# Patient Record
Sex: FEMALE | Race: WHITE | NOT HISPANIC OR LATINO | Employment: PART TIME | URBAN - METROPOLITAN AREA
[De-identification: names, ages, dates, MRNs, and addresses within clinical notes are randomized per-mention and may not be internally consistent; named-entity substitution may affect disease eponyms.]

---

## 2024-06-19 ENCOUNTER — MEDICAL CORRESPONDENCE (OUTPATIENT)
Dept: HEALTH INFORMATION MANAGEMENT | Facility: CLINIC | Age: 44
End: 2024-06-19

## 2024-06-20 ENCOUNTER — TRANSCRIBE ORDERS (OUTPATIENT)
Dept: OTHER | Age: 44
End: 2024-06-20

## 2024-06-20 DIAGNOSIS — M41.25 IDIOPATHIC SCOLIOSIS OF THORACOLUMBAR REGION: Primary | ICD-10-CM

## 2024-07-01 DIAGNOSIS — M41.9 SCOLIOSIS: Primary | ICD-10-CM

## 2024-07-01 NOTE — TELEPHONE ENCOUNTER
"Action July 1, 2024 1:26 PM MT   Action Taken Called [facility] for CE ID# to connect patient charts. We had the patient's name incorrectly spelled. Her anem begins with an \"f\" not an \"s\". Once updated the chart was found and located. Sent a request for imaging from [place].      Action July 2, 2024 9:23 AM MT   Action Taken *MARKED CHARTS FOR MERGE*. Only one xray was sent over, called [facility] tt: rep Dory will push over STAT.        DIAGNOSIS:   Idiopathic scoliosis of thoracolumbar region [M41.25]  - Primary         APPOINTMENT DATE: 06/02/2024   NOTES STATUS DETAILS   OFFICE NOTE from referring provider Care Everywhere 06/04/2024 - Patt Noble PA-C - [facility]   OFFICE NOTE from other specialist Care Everywhere    XRAYS (IMAGES & REPORTS) PACS [place]:  12/20/2023, 08/14/2017 - Scoliosis     "

## 2024-07-02 ENCOUNTER — PRE VISIT (OUTPATIENT)
Dept: ORTHOPEDICS | Facility: CLINIC | Age: 44
End: 2024-07-02

## 2024-07-02 ENCOUNTER — TELEPHONE (OUTPATIENT)
Dept: NEUROSURGERY | Facility: CLINIC | Age: 44
End: 2024-07-02

## 2024-07-02 ENCOUNTER — OFFICE VISIT (OUTPATIENT)
Dept: ORTHOPEDICS | Facility: CLINIC | Age: 44
End: 2024-07-02
Payer: MEDICAID

## 2024-07-02 ENCOUNTER — MEDICAL CORRESPONDENCE (OUTPATIENT)
Dept: HEALTH INFORMATION MANAGEMENT | Facility: CLINIC | Age: 44
End: 2024-07-02

## 2024-07-02 ENCOUNTER — ANCILLARY PROCEDURE (OUTPATIENT)
Dept: GENERAL RADIOLOGY | Facility: CLINIC | Age: 44
End: 2024-07-02
Attending: ORTHOPAEDIC SURGERY
Payer: MEDICAID

## 2024-07-02 VITALS — BODY MASS INDEX: 22.66 KG/M2 | HEIGHT: 65 IN | WEIGHT: 136 LBS

## 2024-07-02 DIAGNOSIS — M41.26 OTHER IDIOPATHIC SCOLIOSIS, LUMBAR REGION: ICD-10-CM

## 2024-07-02 DIAGNOSIS — M41.125 ADOLESCENT IDIOPATHIC SCOLIOSIS OF THORACOLUMBAR REGION: Primary | ICD-10-CM

## 2024-07-02 DIAGNOSIS — M41.9 SCOLIOSIS: ICD-10-CM

## 2024-07-02 PROCEDURE — 72082 X-RAY EXAM ENTIRE SPI 2/3 VW: CPT | Mod: GC | Performed by: RADIOLOGY

## 2024-07-02 PROCEDURE — 77073 BONE LENGTH STUDIES: CPT | Mod: GC | Performed by: RADIOLOGY

## 2024-07-02 PROCEDURE — 99204 OFFICE O/P NEW MOD 45 MIN: CPT | Mod: GC | Performed by: ORTHOPAEDIC SURGERY

## 2024-07-02 RX ORDER — LAMOTRIGINE 200 MG/1
200 TABLET ORAL EVERY MORNING
Status: ON HOLD | COMMUNITY
End: 2024-08-31

## 2024-07-02 RX ORDER — TRETINOIN 0.5 MG/G
CREAM TOPICAL AT BEDTIME
COMMUNITY
Start: 2023-09-01

## 2024-07-02 RX ORDER — TRAZODONE HYDROCHLORIDE 50 MG/1
50 TABLET, FILM COATED ORAL
COMMUNITY
Start: 2024-06-05

## 2024-07-02 RX ORDER — BENZOYL PEROXIDE 10 G/100G
SUSPENSION TOPICAL DAILY
Status: ON HOLD | COMMUNITY
Start: 2023-09-01 | End: 2024-09-06

## 2024-07-02 NOTE — PROGRESS NOTES
Spine Surgery Consultation    REFERRING PHYSICIAN: Referred Self   PRIMARY CARE PHYSICIAN: No Ref-Primary, Physician           Chief Complaint:   Consult (New patient consult for scoliosis.)      History of Present Illness:  Symptom Profile Including: location of symptoms, onset, severity, exacerbating/alleviating factors, previous treatments:        Khoa Cedeño is a 43 year old female who presents to clinic today for evaluation of scoliosis.    She reports that she received diagnosis of scoliosis at age 21.  She reports having some pain in her low back at that time however it is worse over the last 3 to 4 years.  She has never done any brace therapy, no surgical intervention.  She reports pain is worse with prolonged sitting and longer walking.  Pain is localized more so over the left side of her back over the area of the largest and most prominent part of her curvature.  She denies any pain radiating down either leg.  She denies any ike weakness, numbness tingling or other paresthesias down either lower extremity.  She reports that she takes ibuprofen on an as-needed basis over-the-counter for her aches and pains, as well as heat therapy.      Works part-time at a [employer] doing [employer], ability to work longer shifts is limited due to her about the back pain.    She reports that she has a known history of acne on her back.  She does see a dermatologist and has tried a couple of treatments in the past for this without any complete or lasting success in resolving this.         Past Medical History:   Depression and PTSD, takes Lamictal         Past Surgical History:    section, denies any known personal or family issues with anesthesia         Social History:     Non-smoker  Drinks alcohol couple times a week         Family History:   No family history on file.         Allergies:     Allergies   Allergen Reactions    Prochlorperazine Dizziness            Medications:     Current  "Outpatient Medications   Medication Sig Dispense Refill    benzoyl peroxide (PANOXYL) 10 % external liquid Apply topically daily      traZODone (DESYREL) 50 MG tablet Take 50 mg by mouth every 12 hours as needed      tretinoin (RETIN-A) 0.05 % external cream Apply topically at bedtime      LAMICTAL 200 MG tablet Take 200 mg by mouth       No current facility-administered medications for this visit.             Review of Systems:     A 10 point ROS was performed and reviewed. Specific responses to these questions are noted at the end of the document.         Physical Exam:   Vitals: Ht 1.65 m (5' 4.96\")   Wt 61.7 kg (136 lb)   BMI 22.66 kg/m    Constitutional: awake, alert, cooperative, no apparent distress, appears stated age.    Eyes: The sclera are white.  Ears, Nose, Throat: The trachea is midline.  Psychiatric: The patient has a normal affect.  Respiratory: breathing non-labored  Cardiovascular: The extremities are warm and perfused.  Skin: back acne present  Musculoskeletal, Neurologic, and Spine:     Lumbar Spine:    Appearance -gross coronal plane deformity with left-sided inferior rib prominence    Motor -     L2-3: Hip flexion 5/5 R and 5/5 L strength          L3/4:  Knee extension R 5/5 and L 5/5 strength         L4/5:  Foot dorsiflexion R 5/5 L 5/5 and          S1:  Plantarflexion/Peroneal Muscles  R 5/5 and L 5/5 strength    Sensation: intact to light touch L3-S1 distribution BLE      Neurologic:      REFLEXES Left Right   Patella 2+ 2+   Ankle jerk 2+ 2+   Clonus 0 beats 0 beats            Imaging:   We ordered and independently reviewed new radiographs at this clinic visit. The results were discussed with the patient.  Findings include:    Whole-body EOS x-rays obtained today.  Notable for a scoliotic deformity, major lumbar curve measures 60 degree Saunders angle, which is a progression from 40 degrees as compared to L-spine x-rays dated 2017.             Assessment and Plan:   Assessment:  43 year old " female with adolescent idiopathic scoliosis, major lumbar curve.    Serial imaging over the last several years demonstrates a notable and significant progression in the major lumbar curve now to a Saunders angle of around 60 degrees.  Additionally this coincides with a increase in her presenting complaint of back pain, exacerbated by activity and limiting her ability to participate in occupational, recreational and activities of daily living.      We reviewed with the patient the nature of her condition and options for management moving forward.  We discussed both nonoperative and operative treatment.  With conservative measures including observation of the curvature, symptomatic management with over-the-counter analgesics anti-inflammatories, physical therapy, and other noninvasive pain management modalities this would avoid the risks that are inherent to surgical intervention but carries a risk of further curve progression which may exacerbate her symptoms, resulting in development of more prominent neurologic symptoms, as well as make surgical intervention in the future more challenging for deformity correction.    With respect to operative management this would be in the form of thoracolumbar fusion surgery.  We reviewed relevant imaging studies and discussed that ideally, given the patient's age, we would like to preserve as much motion in the lower lumbar segments as possible.  We discussed that the goal of surgery would be to correct the curve to an acceptable physiologic and cosmetic level however complete straightening of the spine may not be technically feasible especially with the goal of avoiding fusion across the lower lumbar segments and pelvis.     Risks of this surgery include risk of infection, risk of dural tear resulting in CSF leak which might result in headaches, or possible need for lumbar drain, or possible revision surgery in the setting of a persistent leak. Risk of hematoma or seroma resulting  in wound complications.  Possible nerve root injury resulting in numbness weakness or paralysis into the arms or legs. Possible radiculitis which could result in similar symptoms or could result in significant neurogenic type pain. There is also a risk of delayed onset nerve root pals or radiculitis, which I explained is potentially due to stretch injury or possibly due to delayed onset swelling, and is sometimes temporary but can also be permanent.  Risk of incomplete decompression which might require revision surgery in the future.  Risk of adjacent segment problems requiring surgery in the future. Risk of incomplete relief of symptoms possibly requiring revision surgery in the future. Furthermore, although rare, there are risks of major vessel injury such as to the major vessels anteriorly or to the bowel from the surgery.  Sometimes this can happen if an instrument is passed anteriorly through the disc space. There is a risk of nonunion which might require revision surgery in the future, and risk of hardware complications from the instrumentation.  I do use imaging to help guide the placement of the instrumentation, but even with this there is a chance of implant malposition or problem.  There is a risk of blood clots in the legs or the lungs.  Lastly, although rare, there are certainly risks of the anesthetic including stroke heart attack and death.      In most cases we need to use a bone graft extender in addition to local autograft.  The bone graft extender is usually crushed cancellous allograft from [employer] spinal graft [employer], produce crushed cancellous allograft  This is needed because there is usually not enough autograft available to complete the fusion.  In some cases we will also add autogenous iliac crest autograft if the quality of the local bone is poor or very limited in quantity.      For posterior lumbar fusions we use "[employer]" screws, rods, and interbody devices.      No  further conservative care is indicated, and the surgery is medically necessary for the following reasons:    There is no indication for further physical therapy in this case.  Further physical therapy would delay necessary medical treatment and prolong the patient's suffering with no benefit and the delay would increase the risk of neurologic decline and/or symptom worsening unnecessarily, with no benefit to the patient and possible harm.       The patient has already trialed oral medications as listed in the medication history, and has trialed activity modification such as decreasing their bending and twisting, and decreasing their walking distance.  In spite of this, and in spite of the conservative therapies documented above, the patient has significant functional disability in their activities of daily living such as prolonged sitting and standing, prolonged walking, and daily chores, each of which are very difficult or painful because of this spinal problem.  Therefore the proposed surgery is medically necessary and the patient has failed conservative care.      Additionally this patient has demonstrated scoliotic curve progression which is at risk of further progression if not addressed surgically, and if left unaddressed for prolonged period of time may obfuscate future surgical intervention.    There are no untreated, underlying mental health conditions (including but not limited to psychological conditions or drug or alcohol abuse) that will preclude an appropriate recovery from this surgery or that are contraindications to proceeding with surgery.      Patient expressed that she would like to proceed with surgical planning at this time.    In this specific patient's case, she does have acne on her back which places her at increased risk of seeding deeper infection.  We would like her to follow-up with her dermatologist to again revisit this issue and determine if any other treatments are available to be  trialed to try to resolve this issue prior to proceeding with surgery and limited risk of getting an infection.     Plan:  Preoperative planning imaging studies: AP supine and side bending films, CT scan thoracic and lumbar spine, MRI lumbar spine  Preoperative clinic referral  Preoperative teaching to be performed today  Follow-up with the dermatologist to discuss options for management of back acne  Phone follow-up with Dr. Hernandez in 3 weeks to see what progress is made with the dermatologist and discuss next steps      Respectfully,  Hansel Hernandez MD  Spine Surgery  [facility]      Attending MD (Dr. Hansel Hernandez) : I personally performed greater than 50% of the effort related to this patient visit and am responsible for the medical decision making and billing in this case.  I met with the patient, reviewed and verified the history and physical exam of the patient and discussed the patient's management with the other clinical providers involved in this patient's care including any involved residents or physicians assistants. I also personally reviewed the imaging and I personally formulated the treatment plan and diagnosis in their entirety.  I reviewed the above note and agree with the documented findings and plan of care, which were communicated to the patient.      Hansel Hernandez MD

## 2024-07-02 NOTE — LETTER
7/2/2024      Khoa Cedeño  [address]  [facility] [address]      Dear Colleague,    Thank you for referring your patient, Khoa Cedeño, to the [facility] [place]. Please see a copy of my visit note below.    Teaching Flowsheet   Relevant Diagnosis: Thoracic/Lumbar  Teaching Topic: Surgery     Person(s) involved in teaching:   Patient     Motivation Level:  Asks Questions: Yes  Eager to Learn: Yes  Cooperative: Yes  Receptive (willing/able to accept information): Yes  Any cultural factors/Moravian beliefs that may influence understanding or compliance? No       Patient demonstrates understanding of the following:  Reason for the appointment, diagnosis and treatment plan: Yes  Knowledge of proper use of medications and conditions for which they are ordered (with special attention to potential side effects or drug interactions): Yes  Which situations necessitate calling provider and whom to contact: Yes       Teaching Concerns Addressed:        Proper use and care of medication (medical equip, care aids, etc.): Yes  Nutritional needs and diet plan: Yes  Pain management techniques: Yes  Wound Care: Yes  How and/when to access community resources: Yes     Instructional Materials Used/Given: pre op packet, soap      Time spent with patient: 15 minutes.      Mckenna Delgadillo RN      Spine Surgery Consultation    REFERRING PHYSICIAN: Referred Self   PRIMARY CARE PHYSICIAN: No Ref-Primary, Physician           Chief Complaint:   Consult (New patient consult for scoliosis.)      History of Present Illness:  Symptom Profile Including: location of symptoms, onset, severity, exacerbating/alleviating factors, previous treatments:        Khoa Cedeño is a 43 year old female who presents to clinic today for evaluation of scoliosis.    She reports that she received diagnosis of scoliosis at age 21.  She reports having some pain in her low back at that time however it is worse over  "the last 3 to 4 years.  She has never done any brace therapy, no surgical intervention.  She reports pain is worse with prolonged sitting and longer walking.  Pain is localized more so over the left side of her back over the area of the largest and most prominent part of her curvature.  She denies any pain radiating down either leg.  She denies any ike weakness, numbness tingling or other paresthesias down either lower extremity.  She reports that she takes ibuprofen on an as-needed basis over-the-counter for her aches and pains, as well as heat therapy.      Works part-time at a [employer] doing [employer], ability to work longer shifts is limited due to her about the back pain.    She reports that she has a known history of acne on her back.  She does see a dermatologist and has tried a couple of treatments in the past for this without any complete or lasting success in resolving this.         Past Medical History:   Depression and PTSD, takes Lamictal         Past Surgical History:    section, denies any known personal or family issues with anesthesia         Social History:     Non-smoker  Drinks alcohol couple times a week         Family History:   No family history on file.         Allergies:     Allergies   Allergen Reactions    Prochlorperazine Dizziness            Medications:     Current Outpatient Medications   Medication Sig Dispense Refill    benzoyl peroxide (PANOXYL) 10 % external liquid Apply topically daily      traZODone (DESYREL) 50 MG tablet Take 50 mg by mouth every 12 hours as needed      tretinoin (RETIN-A) 0.05 % external cream Apply topically at bedtime      LAMICTAL 200 MG tablet Take 200 mg by mouth       No current facility-administered medications for this visit.             Review of Systems:     A 10 point ROS was performed and reviewed. Specific responses to these questions are noted at the end of the document.         Physical Exam:   Vitals: Ht 1.65 m (5' 4.96\")   " Wt 61.7 kg (136 lb)   BMI 22.66 kg/m    Constitutional: awake, alert, cooperative, no apparent distress, appears stated age.    Eyes: The sclera are white.  Ears, Nose, Throat: The trachea is midline.  Psychiatric: The patient has a normal affect.  Respiratory: breathing non-labored  Cardiovascular: The extremities are warm and perfused.  Skin: back acne present  Musculoskeletal, Neurologic, and Spine:     Lumbar Spine:    Appearance -gross coronal plane deformity with left-sided inferior rib prominence    Motor -     L2-3: Hip flexion 5/5 R and 5/5 L strength          L3/4:  Knee extension R 5/5 and L 5/5 strength         L4/5:  Foot dorsiflexion R 5/5 L 5/5 and          S1:  Plantarflexion/Peroneal Muscles  R 5/5 and L 5/5 strength    Sensation: intact to light touch L3-S1 distribution BLE      Neurologic:      REFLEXES Left Right   Patella 2+ 2+   Ankle jerk 2+ 2+   Clonus 0 beats 0 beats            Imaging:   We ordered and independently reviewed new radiographs at this clinic visit. The results were discussed with the patient.  Findings include:    Whole-body EOS x-rays obtained today.  Notable for a scoliotic deformity, major lumbar curve measures 60 degree Saunders angle, which is a progression from 40 degrees as compared to L-spine x-rays dated 2017.             Assessment and Plan:   Assessment:  43 year old female with adolescent idiopathic scoliosis, major lumbar curve.    Serial imaging over the last several years demonstrates a notable and significant progression in the major lumbar curve now to a Saunders angle of around 60 degrees.  Additionally this coincides with a increase in her presenting complaint of back pain, exacerbated by activity and limiting her ability to participate in occupational, recreational and activities of daily living.      We reviewed with the patient the nature of her condition and options for management moving forward.  We discussed both nonoperative and operative treatment.  With  conservative measures including observation of the curvature, symptomatic management with over-the-counter analgesics anti-inflammatories, physical therapy, and other noninvasive pain management modalities this would avoid the risks that are inherent to surgical intervention but carries a risk of further curve progression which may exacerbate her symptoms, resulting in development of more prominent neurologic symptoms, as well as make surgical intervention in the future more challenging for deformity correction.    With respect to operative management this would be in the form of thoracolumbar fusion surgery.  We reviewed relevant imaging studies and discussed that ideally, given the patient's age, we would like to preserve as much motion in the lower lumbar segments as possible.  We discussed that the goal of surgery would be to correct the curve to an acceptable physiologic and cosmetic level however complete straightening of the spine may not be technically feasible especially with the goal of avoiding fusion across the lower lumbar segments and pelvis.     Risks of this surgery include risk of infection, risk of dural tear resulting in CSF leak which might result in headaches, or possible need for lumbar drain, or possible revision surgery in the setting of a persistent leak. Risk of hematoma or seroma resulting in wound complications.  Possible nerve root injury resulting in numbness weakness or paralysis into the arms or legs. Possible radiculitis which could result in similar symptoms or could result in significant neurogenic type pain. There is also a risk of delayed onset nerve root pals or radiculitis, which I explained is potentially due to stretch injury or possibly due to delayed onset swelling, and is sometimes temporary but can also be permanent.  Risk of incomplete decompression which might require revision surgery in the future.  Risk of adjacent segment problems requiring surgery in the future.  Risk of incomplete relief of symptoms possibly requiring revision surgery in the future. Furthermore, although rare, there are risks of major vessel injury such as to the major vessels anteriorly or to the bowel from the surgery.  Sometimes this can happen if an instrument is passed anteriorly through the disc space. There is a risk of nonunion which might require revision surgery in the future, and risk of hardware complications from the instrumentation.  I do use imaging to help guide the placement of the instrumentation, but even with this there is a chance of implant malposition or problem.  There is a risk of blood clots in the legs or the lungs.  Lastly, although rare, there are certainly risks of the anesthetic including stroke heart attack and death.      In most cases we need to use a bone graft extender in addition to local autograft.  The bone graft extender is usually crushed cancellous allograft from [employer], [employer] spinal graft [employer], produce crushed cancellous allograft  This is needed because there is usually not enough autograft available to complete the fusion.  In some cases we will also add autogenous iliac crest autograft if the quality of the local bone is poor or very limited in quantity.      For posterior lumbar fusions we use medtronic screws, rods, and interbody devices.      No further conservative care is indicated, and the surgery is medically necessary for the following reasons:    There is no indication for further physical therapy in this case.  Further physical therapy would delay necessary medical treatment and prolong the patient's suffering with no benefit and the delay would increase the risk of neurologic decline and/or symptom worsening unnecessarily, with no benefit to the patient and possible harm.       The patient has already trialed oral medications as listed in the medication history, and has trialed activity modification such as decreasing their bending and  twisting, and decreasing their walking distance.  In spite of this, and in spite of the conservative therapies documented above, the patient has significant functional disability in their activities of daily living such as prolonged sitting and standing, prolonged walking, and daily chores, each of which are very difficult or painful because of this spinal problem.  Therefore the proposed surgery is medically necessary and the patient has failed conservative care.      Additionally this patient has demonstrated scoliotic curve progression which is at risk of further progression if not addressed surgically, and if left unaddressed for prolonged period of time may obfuscate future surgical intervention.    There are no untreated, underlying mental health conditions (including but not limited to psychological conditions or drug or alcohol abuse) that will preclude an appropriate recovery from this surgery or that are contraindications to proceeding with surgery.      Patient expressed that she would like to proceed with surgical planning at this time.    In this specific patient's case, she does have acne on her back which places her at increased risk of seeding deeper infection.  We would like her to follow-up with her dermatologist to again revisit this issue and determine if any other treatments are available to be trialed to try to resolve this issue prior to proceeding with surgery and limited risk of getting an infection.     Plan:  Preoperative planning imaging studies: AP supine and side bending films, CT scan thoracic and lumbar spine, MRI lumbar spine  Preoperative clinic referral  Preoperative teaching to be performed today  Follow-up with the dermatologist to discuss options for management of back acne  Phone follow-up with Dr. Hernandez in 3 weeks to see what progress is made with the dermatologist and discuss next steps      Respectfully,  Hansel Hernandez MD  Spine Surgery  [facility]  [place]      Attending MD (Dr. Hansel Hernandez) : I personally performed greater than 50% of the effort related to this patient visit and am responsible for the medical decision making and billing in this case.  I met with the patient, reviewed and verified the history and physical exam of the patient and discussed the patient's management with the other clinical providers involved in this patient's care including any involved residents or physicians assistants. I also personally reviewed the imaging and I personally formulated the treatment plan and diagnosis in their entirety.  I reviewed the above note and agree with the documented findings and plan of care, which were communicated to the patient.      Hansel Hernandez MD

## 2024-07-02 NOTE — PROGRESS NOTES
"Spine Surgery Consultation    REFERRING PHYSICIAN: Referred Self   PRIMARY CARE PHYSICIAN: No Ref-Primary, Physician           Chief Complaint:   Consult (New patient consult for scoliosis.)      History of Present Illness:  Symptom Profile Including: location of symptoms, onset, severity, exacerbating/alleviating factors, previous treatments:        Khoa Cedeño is a 43 year old female who ***    Previous treatments  NSAIDS/tylenol: ***  Brace therapy: ***  Injections: ***  Activity modification: ***           Past Medical History:   No past medical history on file.         Past Surgical History:   No past surgical history on file.         Social History:     Social History     Tobacco Use    Smoking status: Not on file    Smokeless tobacco: Not on file   Substance Use Topics    Alcohol use: Not on file            Family History:   No family history on file.         Allergies:     Allergies   Allergen Reactions    Prochlorperazine Dizziness            Medications:     Current Outpatient Medications   Medication Sig Dispense Refill    benzoyl peroxide (PANOXYL) 10 % external liquid Apply topically daily      traZODone (DESYREL) 50 MG tablet Take 50 mg by mouth every 12 hours as needed      tretinoin (RETIN-A) 0.05 % external cream Apply topically at bedtime      LAMICTAL 200 MG tablet Take 200 mg by mouth       No current facility-administered medications for this visit.             Review of Systems:     A 10 point ROS was performed and reviewed. Specific responses to these questions are noted at the end of the document.         Physical Exam:   Vitals: Ht 1.65 m (5' 4.96\")   Wt 61.7 kg (136 lb)   BMI 22.66 kg/m    Constitutional: awake, alert, cooperative, no apparent distress, appears stated age.    Eyes: The sclera are white.  Ears, Nose, Throat: The trachea is midline.  Psychiatric: The patient has a normal affect.  Respiratory: breathing non-labored  Cardiovascular: The extremities are warm and " perfused.  Skin: no obvious rashes or lesions.  Musculoskeletal, Neurologic, and Spine:       Lumbar Spine:    Appearance - No gross stepoffs or deformities, no surgical scars, no lesions    Normal gait without assistive device.  No antalgia / imbalance.  Able to walk on toes and on heels with ease.  No scoliotic curves grossly evident  Patient stands with a neutral standing sagittal balance.      Motor -     L2-3: Hip flexion R 5/5  And L 5/5 strength          L3/4:  Knee extension R 5/5 and L 5/5 strength         L4/5:  Foot dorsiflexion R 5/5 L 5/5 and       EHL dorsiflexion R 4/5 L 4/5 strength         S1:  Plantarflexion/Peroneal Muscles  R 5/5 and L 5/5 strength    Sensation: intact to light touch L3-S1 distribution BLE        Straight leg raise ***      Neurologic:      REFLEXES Right Left                  Patella 2+ 2+   Ankle jerk 2+ 2+   Babinski No upgoing great toe No upgoing great toe   Clonus 0 beats 0 beats     Hip Exam:  No pain with hip log roll and no tenderness over the greater trochanters.      Sacroiliac Joint Tests:  Tenderness: (-) midline, (-) paraspinal, (-) R and L PSIS.      TEST RIGHT LEFT   PSIS tenderness     Hardik finger sign     RONA/Kike     Thigh thrust             Gapping    Compression    Sacral thrust    Gaenslen's               Imaging:   We ordered and independently reviewed new radiographs at this clinic visit. The results were discussed with the patient.  Findings include:    XR of the lumbar spine demonstrate                 Assessment and Plan:   Assessment:  43 year old female with ***     Plan:  ***    Patient seen and discussed with  ***, who is in agreement with the plan noted above.    Paul Vasquez MD   Orthopedic Surgery Resident PGY-4

## 2024-07-02 NOTE — TELEPHONE ENCOUNTER
Patient returned call to schedule surgery with Dr. Hernandez, advised her that Dr. Mojica  is on the phone but will callback in a few minutes    Thao Ramey  7/2/2024 at 3:24 PM

## 2024-07-02 NOTE — PROGRESS NOTES
Teaching Flowsheet   Relevant Diagnosis: Thoracic/Lumbar  Teaching Topic: Surgery     Person(s) involved in teaching:   Patient     Motivation Level:  Asks Questions: Yes  Eager to Learn: Yes  Cooperative: Yes  Receptive (willing/able to accept information): Yes  Any cultural factors/Restoration beliefs that may influence understanding or compliance? No       Patient demonstrates understanding of the following:  Reason for the appointment, diagnosis and treatment plan: Yes  Knowledge of proper use of medications and conditions for which they are ordered (with special attention to potential side effects or drug interactions): Yes  Which situations necessitate calling provider and whom to contact: Yes       Teaching Concerns Addressed:        Proper use and care of medication (medical equip, care aids, etc.): Yes  Nutritional needs and diet plan: Yes  Pain management techniques: Yes  Wound Care: Yes  How and/when to access community resources: Yes     Instructional Materials Used/Given: pre op packet, soap      Time spent with patient: 15 minutes.      Mckenna Delgadillo RN

## 2024-07-02 NOTE — TELEPHONE ENCOUNTER
Called patient to schedule surgery with Dr. Hernandez. No answer, callback number [phone] left on vm.     Jerrica Aguilar on 7/2/2024 at 2:40 PM

## 2024-07-02 NOTE — NURSING NOTE
"Reason For Visit:   Chief Complaint   Patient presents with    Consult     New patient consult for scoliosis.       Primary MD: No Ref-Primary, Physician  Ref. MD: Patt Noble PA-C ([facility])    ?  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  Work status?  Part-time.  Date of injury: NA  Type of injury: NA.  Date of surgery: NA  Type of surgery: NA.  Smoker: No  Request smoking cessation information: No    Ht 1.65 m (5' 4.96\")   Wt 61.7 kg (136 lb)   BMI 22.66 kg/m      Pain Assessment  Patient Currently in Pain: Yes  0-10 Pain Scale: 3  Primary Pain Location: Back    Oswestry (FRED) Questionnaire        7/2/2024    11:06 AM   OSWESTRY DISABILITY INDEX   Count 10   Sum 9   Oswestry Score (%) 18 %            Neck Disability Index (NDI) Questionnaire         No data to display                       Visual Analog Pain Scale  Back Pain Scale 0-10: 3  Right leg pain: 0  Left leg pain: 0  Neck Pain Scale 0-10: 0  Right arm pain: 1 (Right hand numbness)  Left arm pain: 0    Promis 10 Assessment         No data to display                         Brendan Welch, ATC    "

## 2024-07-03 ENCOUNTER — TELEPHONE (OUTPATIENT)
Dept: NEUROSURGERY | Facility: CLINIC | Age: 44
End: 2024-07-03
Payer: MEDICAID

## 2024-07-03 DIAGNOSIS — M41.26 IDIOPATHIC SCOLIOSIS OF LUMBAR SPINE: Primary | ICD-10-CM

## 2024-07-03 NOTE — TELEPHONE ENCOUNTER
Patient is scheduled for surgery with Dr. Hernandez    Spoke with: Patient    Date of Surgery: 8/30/24    Location: [place]    Post op: 6 weeks with MD - 10/15/24 @ 11:40pm    Pre op with Provider: Phone visit 7/22/24    H&P: Scheduled with PAC 8/5/24    Additional imaging/appointments: [place] on [place] [facility]. Will schedule MRI at [facility] in [place]    Surgery packet: Received in clinic     Additional comments: N/A        Shannan Buchanan MA on 7/3/2024 at 12:08 PM

## 2024-07-05 NOTE — TELEPHONE ENCOUNTER
FUTURE VISIT INFORMATION      SURGERY INFORMATION:  Date: 8/5/2024  Location: [facility] OR   Surgeon:  Hansel Hernandez MD   Anesthesia Type:  General   Procedure: Thoracic 11 to Lumbar 4 Instrumented Posterior Interbody Fusion and Nicole Woodson Osteotomy with O-Arm/Stealth Navigation, use of allograft, local autograft, and possible bone morphogenic protein     Action    Action Taken 7/5/2024 4:30pm MELI     I called pt Khoa - she has a PCP at [facility] [phone]    I called [facility] in [place] - they are closed for the holidays.      RECORDS REQUESTED FROM:       Primary Care Provider:    Pertinent Medical History:    Most recent EKG+ Tracing: Yes, around 11 months ago @ [facility]    Most recent Cardiac Stress Test: NA    Most recent Coronary Angiogram:    Most recent Sleep Study:

## 2024-07-22 ENCOUNTER — APPOINTMENT (OUTPATIENT)
Dept: ORTHOPEDICS | Facility: CLINIC | Age: 44
End: 2024-07-22

## 2024-07-22 DIAGNOSIS — M41.125 ADOLESCENT IDIOPATHIC SCOLIOSIS OF THORACOLUMBAR REGION: Primary | ICD-10-CM

## 2024-07-22 NOTE — PROGRESS NOTES
Khoa is a 44 year old who is being evaluated via a billable telephone visit.      Originating Location (pt. Location): Home  {PROVIDER LOCATION On-site should be selected for visits conducted from your clinic location or adjoining [facility] hospital, academic office, or other nearby [facility] building. Off-site should be selected for all other provider locations, including home:[id number]}  Distant Location (provider location):  On-site  Phone call duration: 10 minutes     Diagnosis: Lumbar scoliosis    The patient reach out to her dermatologist and has been started on antibiotics and was also given body wipes to address to the acne and she thinks it is improving.  We also discussed that she needs to complete her CT and lumbar MRI imaging.  I asked my team to reach out to help get this scheduled.  Otherwise we are planning for an T11-L4 posterior spinal fusion for her degenerative scoliosis.  Again briefly discussed risks and benefits with an emphasis on the risk of infection if her acne has not cleared up.  She will let me know if things change in the future.    Risks of this surgery include risk of infection, risk of dural tear resulting in CSF leak which might result in headaches, or possible need for lumbar drain, or possible revision surgery in the setting of a persistent leak. Risk of hematoma or seroma resulting in wound complications.  Possible nerve root injury resulting in numbness weakness or paralysis into the arms or legs. Possible radiculitis which could result in similar symptoms or could result in significant neurogenic type pain. There is also a risk of delayed onset nerve root pals or radiculitis, which I explained is potentially due to stretch injury or possibly due to delayed onset swelling, and is sometimes temporary but can also be permanent.  Risk of incomplete decompression which might require revision surgery in the future.  Risk of adjacent segment problems requiring surgery in the future. Risk of  incomplete relief of symptoms possibly requiring revision surgery in the future. Furthermore, although rare, there are risks of major vessel injury such as to the major vessels anteriorly or to the bowel from the surgery.  Sometimes this can happen if an instrument is passed anteriorly through the disc space. There is a risk of nonunion which might require revision surgery in the future, and risk of hardware complications from the instrumentation.  I do use imaging to help guide the placement of the instrumentation, but even with this there is a chance of implant malposition or problem.  There is a risk of blood clots in the legs or the lungs.  Lastly, although rare, there are certainly risks of the anesthetic including stroke heart attack and death.      CTM

## 2024-07-22 NOTE — TELEPHONE ENCOUNTER
RN called patient to coordinate care and schedule MRI. There are no MRI availability for patient around her trip to the [facility]. RN refaxing MRI order to [facility]. RN encouraged patient to sign up for [facility].    Mckenna Delgadillo RN

## 2024-07-22 NOTE — LETTER
7/22/2024      Khoa Cedeño  [address]  [place] [address]      Dear Colleague,    Thank you for referring your patient, Khoa Cedeño, to the [facility] [place]. Please see a copy of my visit note below.    Khoa is a 44 year old who is being evaluated via a billable telephone visit.      Originating Location (pt. Location): Home    Distant Location (provider location):  On-site  Phone call duration: 10 minutes     Diagnosis: Lumbar scoliosis    The patient reach out to her dermatologist and has been started on antibiotics and was also given body wipes to address to the acne and she thinks it is improving.  We also discussed that she needs to complete her CT and lumbar MRI imaging.  I asked my team to reach out to help get this scheduled.  Otherwise we are planning for an T11-L4 posterior spinal fusion for her degenerative scoliosis.  Again briefly discussed risks and benefits with an emphasis on the risk of infection if her acne has not cleared up.  She will let me know if things change in the future.    Risks of this surgery include risk of infection, risk of dural tear resulting in CSF leak which might result in headaches, or possible need for lumbar drain, or possible revision surgery in the setting of a persistent leak. Risk of hematoma or seroma resulting in wound complications.  Possible nerve root injury resulting in numbness weakness or paralysis into the arms or legs. Possible radiculitis which could result in similar symptoms or could result in significant neurogenic type pain. There is also a risk of delayed onset nerve root pals or radiculitis, which I explained is potentially due to stretch injury or possibly due to delayed onset swelling, and is sometimes temporary but can also be permanent.  Risk of incomplete decompression which might require revision surgery in the future.  Risk of adjacent segment problems requiring surgery in the future. Risk of  incomplete relief of symptoms possibly requiring revision surgery in the future. Furthermore, although rare, there are risks of major vessel injury such as to the major vessels anteriorly or to the bowel from the surgery.  Sometimes this can happen if an instrument is passed anteriorly through the disc space. There is a risk of nonunion which might require revision surgery in the future, and risk of hardware complications from the instrumentation.  I do use imaging to help guide the placement of the instrumentation, but even with this there is a chance of implant malposition or problem.  There is a risk of blood clots in the legs or the lungs.  Lastly, although rare, there are certainly risks of the anesthetic including stroke heart attack and death.      CTM      Again, thank you for allowing me to participate in the care of your patient.        Sincerely,        Hansel Hernandez MD

## 2024-07-23 ENCOUNTER — TELEPHONE (OUTPATIENT)
Dept: NEUROSURGERY | Facility: CLINIC | Age: 44
End: 2024-07-23

## 2024-07-23 NOTE — TELEPHONE ENCOUNTER
Received fax of MRI Screening form from [facility]. Called patient and filled the form out. Faxed back to [facility] at [phone]. Fax receipt verified via rightfax.

## 2024-08-04 LAB
ABO/RH(D): NORMAL
ANTIBODY SCREEN: NEGATIVE
SPECIMEN EXPIRATION DATE: NORMAL

## 2024-08-05 ENCOUNTER — OFFICE VISIT (OUTPATIENT)
Dept: SURGERY | Facility: CLINIC | Age: 44
End: 2024-08-05

## 2024-08-05 ENCOUNTER — ANCILLARY PROCEDURE (OUTPATIENT)
Dept: CT IMAGING | Facility: CLINIC | Age: 44
End: 2024-08-05
Attending: ORTHOPAEDIC SURGERY

## 2024-08-05 ENCOUNTER — LAB (OUTPATIENT)
Dept: LAB | Facility: CLINIC | Age: 44
End: 2024-08-05

## 2024-08-05 ENCOUNTER — PRE VISIT (OUTPATIENT)
Dept: SURGERY | Facility: CLINIC | Age: 44
End: 2024-08-05

## 2024-08-05 ENCOUNTER — APPOINTMENT (OUTPATIENT)
Dept: LAB | Facility: CLINIC | Age: 44
End: 2024-08-05
Payer: MEDICAID

## 2024-08-05 VITALS
SYSTOLIC BLOOD PRESSURE: 116 MMHG | BODY MASS INDEX: 22.66 KG/M2 | OXYGEN SATURATION: 100 % | DIASTOLIC BLOOD PRESSURE: 75 MMHG | HEART RATE: 66 BPM | HEIGHT: 65 IN | TEMPERATURE: 98.4 F | WEIGHT: 136 LBS

## 2024-08-05 DIAGNOSIS — Z01.818 PREOP EXAMINATION: Primary | ICD-10-CM

## 2024-08-05 DIAGNOSIS — Z01.818 PREOP EXAMINATION: ICD-10-CM

## 2024-08-05 DIAGNOSIS — M41.125 ADOLESCENT IDIOPATHIC SCOLIOSIS OF THORACOLUMBAR REGION: ICD-10-CM

## 2024-08-05 DIAGNOSIS — M41.26 OTHER IDIOPATHIC SCOLIOSIS, LUMBAR REGION: ICD-10-CM

## 2024-08-05 LAB
ANION GAP SERPL CALCULATED.3IONS-SCNC: 8 MMOL/L (ref 7–15)
BUN SERPL-MCNC: 7.5 MG/DL (ref 6–20)
CALCIUM SERPL-MCNC: 8.2 MG/DL (ref 8.8–10.4)
CHLORIDE SERPL-SCNC: 106 MMOL/L (ref 98–107)
CREAT SERPL-MCNC: 0.71 MG/DL (ref 0.51–0.95)
EGFRCR SERPLBLD CKD-EPI 2021: >90 ML/MIN/1.73M2
ERYTHROCYTE [DISTWIDTH] IN BLOOD BY AUTOMATED COUNT: 16.2 % (ref 10–15)
GLUCOSE SERPL-MCNC: 85 MG/DL (ref 70–99)
HCO3 SERPL-SCNC: 25 MMOL/L (ref 22–29)
HCT VFR BLD AUTO: 37.4 % (ref 35–47)
HGB BLD-MCNC: 11.9 G/DL (ref 11.7–15.7)
MCH RBC QN AUTO: 26.9 PG (ref 26.5–33)
MCHC RBC AUTO-ENTMCNC: 31.8 G/DL (ref 31.5–36.5)
MCV RBC AUTO: 85 FL (ref 78–100)
PLATELET # BLD AUTO: 213 10E3/UL (ref 150–450)
POTASSIUM SERPL-SCNC: 3.9 MMOL/L (ref 3.4–5.3)
RBC # BLD AUTO: 4.42 10E6/UL (ref 3.8–5.2)
SODIUM SERPL-SCNC: 139 MMOL/L (ref 135–145)
WBC # BLD AUTO: 6.6 10E3/UL (ref 4–11)

## 2024-08-05 PROCEDURE — 86900 BLOOD TYPING SEROLOGIC ABO: CPT

## 2024-08-05 PROCEDURE — 80048 BASIC METABOLIC PNL TOTAL CA: CPT | Performed by: PATHOLOGY

## 2024-08-05 PROCEDURE — 36415 COLL VENOUS BLD VENIPUNCTURE: CPT | Performed by: PATHOLOGY

## 2024-08-05 PROCEDURE — 72128 CT CHEST SPINE W/O DYE: CPT | Performed by: RADIOLOGY

## 2024-08-05 PROCEDURE — 85027 COMPLETE CBC AUTOMATED: CPT | Performed by: PATHOLOGY

## 2024-08-05 PROCEDURE — 99203 OFFICE O/P NEW LOW 30 MIN: CPT | Performed by: PHYSICIAN ASSISTANT

## 2024-08-05 PROCEDURE — 72131 CT LUMBAR SPINE W/O DYE: CPT | Performed by: RADIOLOGY

## 2024-08-05 RX ORDER — MINOCYCLINE HYDROCHLORIDE 100 MG/1
100 CAPSULE ORAL EVERY MORNING
COMMUNITY
Start: 2024-07-05

## 2024-08-05 RX ORDER — MULTIVITAMIN
1 TABLET ORAL DAILY
COMMUNITY

## 2024-08-05 RX ORDER — IBUPROFEN 200 MG
200 TABLET ORAL EVERY 4 HOURS PRN
Status: ON HOLD | COMMUNITY
End: 2024-09-04

## 2024-08-05 RX ORDER — MULTIVITAMIN WITH IRON
1 TABLET ORAL DAILY
COMMUNITY

## 2024-08-05 ASSESSMENT — PAIN SCALES - GENERAL: PAINLEVEL: NO PAIN (0)

## 2024-08-05 ASSESSMENT — ENCOUNTER SYMPTOMS: SEIZURES: 0

## 2024-08-05 ASSESSMENT — LIFESTYLE VARIABLES: TOBACCO_USE: 1

## 2024-08-05 NOTE — H&P
Pre-Operative H & P     CC:  Preoperative exam to assess for increased cardiopulmonary risk while undergoing surgery and anesthesia.    Date of Encounter: 8/5/2024  Primary Care Physician:  Audrey Carlson     Reason for visit:   Encounter Diagnoses   Name Primary?    Adolescent idiopathic scoliosis of thoracolumbar region Yes    Preop examination        HPI  Khoa Cedeño is a 44 year old female who presents for pre-operative H & P in preparation for  Procedure Information       Case: [id number] Date/Time: 08/30/24 0730    Procedure: Thoracic 11 to Lumbar 4 Instrumented Posterior Interbody Fusion and Nicole Woodson Osteotomy with O-Arm/Stealth Navigation, use of allograft, local autograft, and possible bone morphogenic protein (Spine)    Anesthesia type: General    Diagnosis: Adolescent idiopathic scoliosis of thoracolumbar region [M41.125]    Pre-op diagnosis: Adolescent idiopathic scoliosis of thoracolumbar region [M41.125]    [facility]: [facility] / [facility]    Providers: Hansel Hernandez MD            Patient is being evaluated for comorbid conditions of depression, PTSD.    Ms. Cedeño was diagnosed with scoliosis at age 21. She reports having some pain in her low back at that time however it is worse over the last 3 to 4 years. She has never done any brace therapy, no surgical intervention. Recent imaging is notable for a scoliotic deformity, major lumbar curve measures 60 degree Saunders angle, which is a progression from 40 degrees as compared to L-spine x-rays dated 2017. She now presents for the above procedure.      History is obtained from the patient and chart review    Hx of abnormal bleeding or anti-platelet use: denies    Menstrual history: Patient's last menstrual period was 07/20/2024.:       Past Medical History  Past Medical History:   Diagnosis Date    Adolescent idiopathic scoliosis of thoracolumbar region     Depression     PTSD (post-traumatic stress disorder)        Past Surgical  History  Past Surgical History:   Procedure Laterality Date    ABDOMINOPLASTY       SECTION      x5    SALPINGECTOMY      during        Prior to Admission Medications  Current Outpatient Medications   Medication Sig Dispense Refill    ibuprofen (ADVIL/MOTRIN) 200 MG tablet Take 200 mg by mouth every 4 hours as needed for pain      LAMICTAL 200 MG tablet Take 200 mg by mouth every morning      minocycline (MINOCIN) 100 MG capsule Take 100 mg by mouth every morning      multivitamin w/minerals (MULTI-VITAMIN) tablet Take 1 tablet by mouth daily      traZODone (DESYREL) 50 MG tablet Take 50 mg by mouth nightly as needed      vitamin C with B complex (B COMPLEX-C) tablet Take 1 tablet by mouth daily      benzoyl peroxide (PANOXYL) 10 % external liquid Apply topically daily      tretinoin (RETIN-A) 0.05 % external cream Apply topically at bedtime         Allergies  Allergies   Allergen Reactions    Prochlorperazine Dizziness       Social History  Social History     Socioeconomic History    Marital status:      Spouse name: Not on file    Number of children: Not on file    Years of education: Not on file    Highest education level: Not on file   Occupational History    Not on file   Tobacco Use    Smoking status: Former     Types: Cigarettes    Smokeless tobacco: Never   Substance and Sexual Activity    Alcohol use: Yes     Comment: 2 glasses of wine twice a week    Drug use: Never    Sexual activity: Not on file   Other Topics Concern    Not on file   Social History Narrative    Not on file     Social Determinants of Health     Financial Resource Strain: Not on file   Food Insecurity: Not on file   Transportation Needs: Not on file   Physical Activity: Not on file   Stress: Not on file   Social Connections: Not on file   Interpersonal Safety: Not on file   Housing Stability: Not on file       Family History  Family History   Problem Relation Age of Onset    Anesthesia Reaction No family hx of      "Deep Vein Thrombosis (DVT) No family hx of        Review of Systems  The complete review of systems is negative other than noted in the HPI or here.     Anesthesia Evaluation   Pt has had prior anesthetic.     History of anesthetic complications   Pt reports difficulty w/ spinal epidural placement 2/2 scoliosis.    ROS/MED HX  ENT/Pulmonary:     (+)                tobacco use (quit 2006), Past use,                    (-) asthma, sleep apnea and recent URI   Neurologic:  - neg neurologic ROS  (-) no seizures and no CVA   Cardiovascular:     (+)  - -   -  - -                           valvular problems/murmurs  known murmur.         METS/Exercise Tolerance: 4 - Raking leaves, gardening    Hematologic:  - neg hematologic  ROS  (-) history of blood clots and history of blood transfusion   Musculoskeletal: Comment: Scoliosis        GI/Hepatic:  - neg GI/hepatic ROS  (-) GERD and liver disease   Renal/Genitourinary:  - neg Renal ROS  (-) renal disease   Endo:  - neg endo ROS  (-) Type II DM   Psychiatric/Substance Use: Comment: PTSD    (+) psychiatric history depression       Infectious Disease:  - neg infectious disease ROS     Malignancy:  - neg malignancy ROS     Other:  - neg other ROS          /75 (BP Location: Right arm, Patient Position: Sitting, Cuff Size: Adult Regular)   Pulse 66   Temp 98.4  F (36.9  C) (Oral)   Ht 1.651 m (5' 5\")   Wt 61.7 kg (136 lb)   LMP 07/20/2024   SpO2 100%   Breastfeeding No   BMI 22.63 kg/m      Physical Exam  Constitutional: Awake, alert, cooperative, no apparent distress, and appears stated age.  Eyes: Pupils equal, round and reactive to light, extra ocular muscles intact, sclera clear, conjunctiva normal.  HENT: Normocephalic, oral pharynx with moist mucus membranes, good dentition. No goiter appreciated. No removable dental hardware.  Respiratory: Clear to auscultation bilaterally, no crackles or wheezing. No SOB when supine.  Cardiovascular: Regular rate and rhythm, " normal S1 and S2, and no murmur noted.  Carotids +2, no bruits. No edema. Palpable pulses to radial, DP and PT arteries.   GI: Normal bowel sounds, soft, non-distended, non-tender, no masses palpated.    Lymph/Hematologic: No cervical lymphadenopathy and no supraclavicular lymphadenopathy.  Genitourinary:  deferred  Skin: Warm and dry.  No rashes.   Musculoskeletal: full ROM of neck. There is no redness, warmth, or swelling of the joints. Gross motor strength is normal.    Neurologic: Awake, alert, oriented to name, place and time. Cranial nerves II-XII are grossly intact. Gait is normal. Ambulates from chair to exam table, seats self, lies supine and sits back up w/o assistance.  Neuropsychiatric: Calm, cooperative. Normal affect. Pleasant. Answers questions appropriately, follows commands w/o difficulty.        PRIOR LABS/DIAGNOSTIC STUDIES:    All labs and imaging personally reviewed        The patient's records and results personally reviewed by this provider.     LAB/DIAGNOSTIC STUDIES TODAY:  BMP, CBC, T&S    Assessment    Khoa Cedeño is a 44 year old female seen as a PAC referral for risk assessment and optimization for anesthesia.    Plan/Recommendations  Pt will be optimized for the proposed procedure.  See below for details on the assessment, risk, and preoperative recommendations    NEUROLOGY  - No history of TIA, CVA or seizure    -Post Op delirium risk factors:  No risk identified    ENT  - No current airway concerns.  Will need to be reassessed day of surgery.  Mallampati: III  TM: > 3    CARDIAC  - No history of CAD, Hypertension, and Afib  - METS (Metabolic Equivalents)  Patient performs 4 or more METS exercise without symptoms             Total Score: 0      RCRI-Low risk: Class 2 0.9% complication rate             Total Score: 1    RCRI: High Risk Surgery        PULMONARY  SHAYY Low Risk             Total Score: 0      - Denies asthma or inhaler use  - Tobacco History    History   Smoking Status  "   Former    Types: Cigarettes   Smokeless Tobacco    Never       GI  - Denies GERD  PONV High Risk  Total Score: 3           1 AN PONV: Pt is Female    1 AN PONV: Patient is not a current smoker    1 AN PONV: Intended Post Op Opioids          ENDOCRINE    - BMI: Estimated body mass index is 22.63 kg/m  as calculated from the following:    Height as of this encounter: 1.651 m (5' 5\").    Weight as of this encounter: 61.7 kg (136 lb).  Healthy Weight (BMI 18.5-24.9)  - No history of Diabetes Mellitus    HEME  VTE Low Risk 0.26%             Total Score: 0      - No history of abnormal bleeding or antiplatelet use.      MSK  Patient is NOT Frail             Total Score: 0      - Idiopathic scoliosis      The patient is aware that the final anesthesia plan will be decided by the assigned anesthesia provider on the date of service.      The patient is optimized for their procedure. AVS with information on surgery time/arrival time, meds and NPO status given by nursing staff. No further diagnostic testing indicated.      On the day of service:     Prep time: 12 minutes  Visit time: 18 minutes  Documentation time: 10 minutes  ------------------------------------------  Total time: 40 minutes      Jigna Simpson PA-C  Preoperative Assessment Center  [facility]  Clinic and Surgery Center  Phone: [phone]  Fax: [phone]    "

## 2024-08-05 NOTE — PATIENT INSTRUCTIONS
Preparing for Your Surgery      Name:  Khoa Cedeño   MRN:  [id number]   :  1980   Today's Date:  2024       Arriving for surgery:  Surgery date:  24  Arrival time:  5.30AM    Please come to:     Please come to:       [facility] [address]   [address] [place]  [address]     The Green Ramp for patients and visitors is beneath the [facility]. The parking facility entrance is at the intersection of [address] [place] [address] and [address]. Patients and visitors who self-park will receive the reduced hospital parking rate (no ticket validation needed).     [employer] parking, located at the [facility] main entrance on [address] [place] [address], is available Monday - Friday from 7 am to 3:30 pm.     Discounted parking pass options can be purchased from  attendants during business hours.     -Check in at the security desk in the [facility] ([facility])   Lobby. They will direct you to the correct elevators.   -Proceed to the 3rd floor, Adult Surgery Waiting Lounge. [phone]     If you need directions, a wheelchair or escort please stop at the Information Desk in the lobby.  Inform the information person you are here for surgery; a wheelchair and escort to [address] will be provided.   An escort to the Adult Surgery Waiting Lounge will be provided. .    What can I eat or drink?  -  You may eat and drink normally up to 8 hours prior to arrival time. (Until 9.30PM)  -  You may have clear liquids until 2 hours prior to arrival time. (Until 3.30AM)    Examples of clear liquids:  Water  Clear broth  Juices (apple, white grape, white cranberry  and cider) without pulp  Noncarbonated, powder based beverages  (lemonade and Umesh-Aid)  Sodas (Sprite, 7-Up, ginger ale and seltzer)  Coffee or tea (without milk or cream)  Gatorade    -  No Alcohol or cannabis products for at least  24 hours before surgery.     Which medicines can I take?    Hold Aspirin for 7 days before surgery.   Hold Multivitamins for 7 days before surgery.  Hold Supplements for 7 days before surgery.  Hold Ibuprofen (Advil, Motrin) for 3 day(s) before surgery--unless otherwise directed by surgeon.  Hold Naproxen (Aleve) for 4 days before surgery.    -  DO NOT take these medications the day of surgery:  External liquid, cream.  Continue to hold multi-vitamin, Ibuprofen, and Vitamin B/Complex    -  PLEASE TAKE these medications the day of surgery:  Minocycline  Lamictal.    How do I prepare myself?  - Please take 2 showers (one the night prior to surgery and one the morning of surgery) using Scrubcare or Hibiclens soap.    Use this soap only from the neck to your toes.     Leave the soap on your skin for one minute--then rinse thoroughly.      You may use your own shampoo and conditioner. No other hair products.   - Please remove all jewelry and body piercings.  - No lotions, deodorants or fragrance.  - No makeup or fingernail polish.   - Bring your ID and insurance card.    -If you use a CPAP machine, please bring the CPAP machine, tubing, and mask to hospital.    -If you have a Deep Brain Stimulator, Spinal Cord Stimulator, or any Neuro Stimulator device---you must bring the remote control to the hospital.      ALL PATIENTS GOING HOME THE SAME DAY OF SURGERY ARE REQUIRED TO HAVE A RESPONSIBLE ADULT TO DRIVE AND BE IN ATTENDANCE WITH THEM FOR 24 HOURS FOLLOWING SURGERY.    Covid testing policy as of 12/06/2022  Your surgeon will notify and schedule you for a COVID test if one is needed before surgery--please direct any questions or COVID symptoms to your surgeon      Questions or Concerns:    - For any questions regarding the day of surgery or your hospital stay, please contact the Pre Admission Nursing Office at [phone].       - If you have health changes between today and your surgery, please call your surgeon.        - For questions after surgery, please call your surgeons office.           Current Visitor Guidelines    You may have 2 visitors in the pre op area.    Visiting hours: 8 a.m. to 8:30 p.m.    Patients confirmed or suspected to have symptoms of COVID 19 or flu:     No visitors allowed for adult patients.   Children (under age 18) can have 1 named visitor.     People who are sick or showing symptoms of COVID 19 or flu:    Are not allowed to visit patients--we can only make exceptions in special situations.       Please follow these guidelines for your visit:          Please maintain social distance          Masking is optional--however at times you may be asked to wear a mask for the safety of yourself and others     Clean your hands with alcohol hand . Do this when you arrive at and leave the building and patient room,    And again after you touch your mask or anything in the room.     Go directly to and from the room you are visiting.     Stay in the patient s room during your visit. Limit going to other places in the hospital as much as possible     Leave bags and jackets at home or in the car.     For everyone s health, please don t come and go during your visit. That includes for smoking   during your visit.           OPTIMAL RECOVERY AFTER SURGERY        Begin hydrating yourself by drinking at least 8-10 glasses of clear liquids for 24 hours before surgery:      Suggested clear liquids:   Water    Clear Juices   Clear Broth   Non- carbonated beverages    (Crystal Light or Umesh Aid)   Sodas    (Sprite, 7 up, ginger ale, seltzer)   Gatorade              Drink clear liquids up until 4 hours before your surgery.       We would like you to purchase a drink such as Gatorade or Ensure Clear (not the milkshake type).  Drink this before bedtime and the morning of surgery drink between 8-10 ounces or until you feel hydrated.        Keeping well hydrated leads to your veins being plump, you wake up faster,  and you are less likely to be nauseated. Start drinking water as soon as you can after surgery and advance to clear liquids and food as tolerated.  IV fluids contain salt, drinking fluids will minimize the amount of IV fluids you need and decrease the amount of salt you get.                 The most common reason for the patient to be readmitted is dehydration. Staying hydrated after you go home from the hospital is very important.  Ensure or Ensure Clear are good options to keep you hydrated.

## 2024-08-06 LAB
RADIOLOGIST FLAGS: NORMAL
RADIOLOGIST FLAGS: NORMAL

## 2024-08-19 RX ORDER — ACETAMINOPHEN 325 MG/1
975 TABLET ORAL ONCE
Status: CANCELLED | OUTPATIENT
Start: 2024-08-19 | End: 2024-08-19

## 2024-08-19 RX ORDER — TRANEXAMIC ACID 10 MG/ML
10 INJECTION, SOLUTION INTRAVENOUS CONTINUOUS
Status: CANCELLED | OUTPATIENT
Start: 2024-08-19

## 2024-08-19 RX ORDER — CEFAZOLIN SODIUM/WATER 2 G/20 ML
2 SYRINGE (ML) INTRAVENOUS
Status: CANCELLED | OUTPATIENT
Start: 2024-08-19

## 2024-08-19 RX ORDER — CEFAZOLIN SODIUM/WATER 2 G/20 ML
2 SYRINGE (ML) INTRAVENOUS SEE ADMIN INSTRUCTIONS
Status: CANCELLED | OUTPATIENT
Start: 2024-08-19

## 2024-08-19 RX ORDER — GABAPENTIN 300 MG/1
300 CAPSULE ORAL
Status: CANCELLED | OUTPATIENT
Start: 2024-08-19

## 2024-08-23 ENCOUNTER — TELEPHONE (OUTPATIENT)
Dept: ORTHOPEDICS | Facility: CLINIC | Age: 44
End: 2024-08-23
Payer: MEDICAID

## 2024-08-23 NOTE — TELEPHONE ENCOUNTER
Left Voicemail (1st Attempt) and Sent Mychart (1st Attempt) for the patient to call back and schedule the following:    Appointment type: Telephone Visit Return  Provider: Dr. Hernandez  Return date: 8/26/24

## 2024-08-30 ENCOUNTER — HOSPITAL ENCOUNTER (INPATIENT)
Facility: CLINIC | Age: 44
LOS: 7 days | Discharge: HOME | DRG: 458 | End: 2024-09-06
Attending: ORTHOPAEDIC SURGERY | Admitting: ORTHOPAEDIC SURGERY
Payer: MEDICAID

## 2024-08-30 ENCOUNTER — APPOINTMENT (OUTPATIENT)
Dept: GENERAL RADIOLOGY | Facility: CLINIC | Age: 44
DRG: 458 | End: 2024-08-30
Attending: ORTHOPAEDIC SURGERY
Payer: MEDICAID

## 2024-08-30 ENCOUNTER — ANESTHESIA (OUTPATIENT)
Dept: SURGERY | Facility: CLINIC | Age: 44
DRG: 458 | End: 2024-08-30
Payer: MEDICAID

## 2024-08-30 ENCOUNTER — ANESTHESIA EVENT (OUTPATIENT)
Dept: SURGERY | Facility: CLINIC | Age: 44
DRG: 458 | End: 2024-08-30
Payer: MEDICAID

## 2024-08-30 DIAGNOSIS — Z98.1 S/P FUSION OF THORACIC SPINE: Primary | ICD-10-CM

## 2024-08-30 LAB
ABO/RH(D): NORMAL
ANTIBODY SCREEN: NEGATIVE
GLUCOSE BLDC GLUCOMTR-MCNC: 86 MG/DL (ref 70–99)
SPECIMEN EXPIRATION DATE: NORMAL

## 2024-08-30 PROCEDURE — 0RG7071 FUSION OF 2 TO 7 THORACIC VERTEBRAL JOINTS WITH AUTOLOGOUS TISSUE SUBSTITUTE, POSTERIOR APPROACH, POSTERIOR COLUMN, OPEN APPROACH: ICD-10-PCS | Performed by: ORTHOPAEDIC SURGERY

## 2024-08-30 PROCEDURE — 22802 ARTHRD PST DFRM 7-12 VRT SGM: CPT

## 2024-08-30 PROCEDURE — 258N000001 HC RX 258: Performed by: ORTHOPAEDIC SURGERY

## 2024-08-30 PROCEDURE — 99254 IP/OBS CNSLTJ NEW/EST MOD 60: CPT | Performed by: PHYSICIAN ASSISTANT

## 2024-08-30 PROCEDURE — 250N000013 HC RX MED GY IP 250 OP 250 PS 637: Performed by: PHYSICIAN ASSISTANT

## 2024-08-30 PROCEDURE — C1889 IMPLANT/INSERT DEVICE, NOC: HCPCS | Performed by: ORTHOPAEDIC SURGERY

## 2024-08-30 PROCEDURE — 36415 COLL VENOUS BLD VENIPUNCTURE: CPT | Performed by: STUDENT IN AN ORGANIZED HEALTH CARE EDUCATION/TRAINING PROGRAM

## 2024-08-30 PROCEDURE — 258N000003 HC RX IP 258 OP 636

## 2024-08-30 PROCEDURE — 250N000011 HC RX IP 250 OP 636

## 2024-08-30 PROCEDURE — 250N000025 HC SEVOFLURANE, PER MIN: Performed by: ORTHOPAEDIC SURGERY

## 2024-08-30 PROCEDURE — 22802 ARTHRD PST DFRM 7-12 VRT SGM: CPT | Performed by: STUDENT IN AN ORGANIZED HEALTH CARE EDUCATION/TRAINING PROGRAM

## 2024-08-30 PROCEDURE — 0PS404Z REPOSITION THORACIC VERTEBRA WITH INTERNAL FIXATION DEVICE, OPEN APPROACH: ICD-10-PCS | Performed by: ORTHOPAEDIC SURGERY

## 2024-08-30 PROCEDURE — 36620 INSERTION CATHETER ARTERY: CPT | Mod: 59 | Performed by: STUDENT IN AN ORGANIZED HEALTH CARE EDUCATION/TRAINING PROGRAM

## 2024-08-30 PROCEDURE — 360N000086 HC SURGERY LEVEL 6 W/ FLUORO, PER MIN: Performed by: ORTHOPAEDIC SURGERY

## 2024-08-30 PROCEDURE — 999N000180 XR SURGERY CARM FLUORO LESS THAN 5 MIN: Mod: TC

## 2024-08-30 PROCEDURE — 999N000141 HC STATISTIC PRE-PROCEDURE NURSING ASSESSMENT: Performed by: ORTHOPAEDIC SURGERY

## 2024-08-30 PROCEDURE — 01N80ZZ RELEASE THORACIC NERVE, OPEN APPROACH: ICD-10-PCS | Performed by: ORTHOPAEDIC SURGERY

## 2024-08-30 PROCEDURE — 250N000009 HC RX 250: Performed by: PHYSICIAN ASSISTANT

## 2024-08-30 PROCEDURE — L8699 PROSTHETIC IMPLANT NOS: HCPCS | Performed by: ORTHOPAEDIC SURGERY

## 2024-08-30 PROCEDURE — 250N000011 HC RX IP 250 OP 636: Performed by: PHYSICIAN ASSISTANT

## 2024-08-30 PROCEDURE — 0RBB0ZZ EXCISION OF THORACOLUMBAR VERTEBRAL DISC, OPEN APPROACH: ICD-10-PCS | Performed by: ORTHOPAEDIC SURGERY

## 2024-08-30 PROCEDURE — 120N000003 HC R&B IMCU UMMC

## 2024-08-30 PROCEDURE — 250N000011 HC RX IP 250 OP 636: Performed by: STUDENT IN AN ORGANIZED HEALTH CARE EDUCATION/TRAINING PROGRAM

## 2024-08-30 PROCEDURE — C1713 ANCHOR/SCREW BN/BN,TIS/BN: HCPCS | Performed by: ORTHOPAEDIC SURGERY

## 2024-08-30 PROCEDURE — 250N000009 HC RX 250

## 2024-08-30 PROCEDURE — 250N000013 HC RX MED GY IP 250 OP 250 PS 637

## 2024-08-30 PROCEDURE — 272N000001 HC OR GENERAL SUPPLY STERILE: Performed by: ORTHOPAEDIC SURGERY

## 2024-08-30 PROCEDURE — 01NB0ZZ RELEASE LUMBAR NERVE, OPEN APPROACH: ICD-10-PCS | Performed by: ORTHOPAEDIC SURGERY

## 2024-08-30 PROCEDURE — 250N000011 HC RX IP 250 OP 636: Performed by: ORTHOPAEDIC SURGERY

## 2024-08-30 PROCEDURE — 0RB90ZZ EXCISION OF THORACIC VERTEBRAL DISC, OPEN APPROACH: ICD-10-PCS | Performed by: ORTHOPAEDIC SURGERY

## 2024-08-30 PROCEDURE — 258N000003 HC RX IP 258 OP 636: Performed by: PHYSICIAN ASSISTANT

## 2024-08-30 PROCEDURE — 86900 BLOOD TYPING SEROLOGIC ABO: CPT | Performed by: STUDENT IN AN ORGANIZED HEALTH CARE EDUCATION/TRAINING PROGRAM

## 2024-08-30 PROCEDURE — 0SB20ZZ EXCISION OF LUMBAR VERTEBRAL DISC, OPEN APPROACH: ICD-10-PCS | Performed by: ORTHOPAEDIC SURGERY

## 2024-08-30 PROCEDURE — 0SG1071 FUSION OF 2 OR MORE LUMBAR VERTEBRAL JOINTS WITH AUTOLOGOUS TISSUE SUBSTITUTE, POSTERIOR APPROACH, POSTERIOR COLUMN, OPEN APPROACH: ICD-10-PCS | Performed by: ORTHOPAEDIC SURGERY

## 2024-08-30 PROCEDURE — 0QS004Z REPOSITION LUMBAR VERTEBRA WITH INTERNAL FIXATION DEVICE, OPEN APPROACH: ICD-10-PCS | Performed by: ORTHOPAEDIC SURGERY

## 2024-08-30 PROCEDURE — 710N000010 HC RECOVERY PHASE 1, LEVEL 2, PER MIN: Performed by: ORTHOPAEDIC SURGERY

## 2024-08-30 PROCEDURE — C1762 CONN TISS, HUMAN(INC FASCIA): HCPCS | Performed by: ORTHOPAEDIC SURGERY

## 2024-08-30 PROCEDURE — 0RGA071 FUSION OF THORACOLUMBAR VERTEBRAL JOINT WITH AUTOLOGOUS TISSUE SUBSTITUTE, POSTERIOR APPROACH, POSTERIOR COLUMN, OPEN APPROACH: ICD-10-PCS | Performed by: ORTHOPAEDIC SURGERY

## 2024-08-30 PROCEDURE — 86901 BLOOD TYPING SEROLOGIC RH(D): CPT | Performed by: STUDENT IN AN ORGANIZED HEALTH CARE EDUCATION/TRAINING PROGRAM

## 2024-08-30 PROCEDURE — 370N000017 HC ANESTHESIA TECHNICAL FEE, PER MIN: Performed by: ORTHOPAEDIC SURGERY

## 2024-08-30 PROCEDURE — 8E0WXBF COMPUTER ASSISTED PROCEDURE OF TRUNK REGION, WITH FLUOROSCOPY: ICD-10-PCS | Performed by: ORTHOPAEDIC SURGERY

## 2024-08-30 DEVICE — IMP SCR MEDT 5.5/6.0MM SOLERA 6.5X50MM MA 55840006550: Type: IMPLANTABLE DEVICE | Site: SPINE THORACIC | Status: FUNCTIONAL

## 2024-08-30 DEVICE — IMP SCR MEDT 5.5/6.0MM SOLERA 7.5X45MM MA 55840007545: Type: IMPLANTABLE DEVICE | Site: SPINE THORACIC | Status: FUNCTIONAL

## 2024-08-30 DEVICE — IMP SCR MEDT 5.5/6.0MM SOLERA 6.5X40MM MA 55840006540: Type: IMPLANTABLE DEVICE | Site: SPINE THORACIC | Status: FUNCTIONAL

## 2024-08-30 DEVICE — IMP SCR MEDT 5.5/6.0MM SOLERA 5.5X45MM MA 55840005545: Type: IMPLANTABLE DEVICE | Site: SPINE THORACIC | Status: FUNCTIONAL

## 2024-08-30 DEVICE — IMPLANTABLE DEVICE: Type: IMPLANTABLE DEVICE | Site: SPINE THORACIC | Status: FUNCTIONAL

## 2024-08-30 DEVICE — IMP SCR SET MEDT SOLERA BREAK OFF 5.5MM TI 5540030: Type: IMPLANTABLE DEVICE | Site: SPINE THORACIC | Status: FUNCTIONAL

## 2024-08-30 DEVICE — GRAFT BONE INFUSE BMP LG 7510600: Type: IMPLANTABLE DEVICE | Site: SPINE THORACIC | Status: FUNCTIONAL

## 2024-08-30 DEVICE — GRAFT BN CANC 30CC CRSH 1-10MM 800104: Type: IMPLANTABLE DEVICE | Site: SPINE THORACIC | Status: FUNCTIONAL

## 2024-08-30 DEVICE — IMP SCR MEDT 5.5/6.0MM SOLERA 5.5X50MM MA 55840005550: Type: IMPLANTABLE DEVICE | Site: SPINE THORACIC | Status: FUNCTIONAL

## 2024-08-30 DEVICE — IMP SCR MEDT 5.5/6.0MM SOLERA 4.5X45MM MA 55840004545: Type: IMPLANTABLE DEVICE | Site: SPINE THORACIC | Status: FUNCTIONAL

## 2024-08-30 DEVICE — IMP ROD MEDT SOLERA LINED 5.5X500MM CHR 1555200500: Type: IMPLANTABLE DEVICE | Site: SPINE THORACIC | Status: FUNCTIONAL

## 2024-08-30 DEVICE — IMP ROD MEDT SOLERA NON-STR 5.5/6.0X500MM CHR 1556009500: Type: IMPLANTABLE DEVICE | Site: SPINE THORACIC | Status: FUNCTIONAL

## 2024-08-30 RX ORDER — ACETAMINOPHEN 325 MG/1
975 TABLET ORAL ONCE
Status: COMPLETED | OUTPATIENT
Start: 2024-08-30 | End: 2024-08-30

## 2024-08-30 RX ORDER — ACETAMINOPHEN 325 MG/1
975 TABLET ORAL ONCE
Status: DISCONTINUED | OUTPATIENT
Start: 2024-08-30 | End: 2024-08-30 | Stop reason: HOSPADM

## 2024-08-30 RX ORDER — ONDANSETRON 4 MG/1
4 TABLET, ORALLY DISINTEGRATING ORAL EVERY 30 MIN PRN
Status: DISCONTINUED | OUTPATIENT
Start: 2024-08-30 | End: 2024-08-30 | Stop reason: HOSPADM

## 2024-08-30 RX ORDER — LIDOCAINE 40 MG/G
CREAM TOPICAL
Status: DISCONTINUED | OUTPATIENT
Start: 2024-08-30 | End: 2024-08-30 | Stop reason: HOSPADM

## 2024-08-30 RX ORDER — PROCHLORPERAZINE MALEATE 5 MG
10 TABLET ORAL EVERY 6 HOURS PRN
Status: DISCONTINUED | OUTPATIENT
Start: 2024-08-30 | End: 2024-08-31

## 2024-08-30 RX ORDER — LIDOCAINE 40 MG/G
CREAM TOPICAL
Status: DISCONTINUED | OUTPATIENT
Start: 2024-08-30 | End: 2024-09-06 | Stop reason: HOSPADM

## 2024-08-30 RX ORDER — MECLIZINE HCL 12.5 MG 12.5 MG/1
12.5 TABLET ORAL 3 TIMES DAILY PRN
Status: DISCONTINUED | OUTPATIENT
Start: 2024-08-30 | End: 2024-09-06 | Stop reason: HOSPADM

## 2024-08-30 RX ORDER — FENTANYL CITRATE 50 UG/ML
50 INJECTION, SOLUTION INTRAMUSCULAR; INTRAVENOUS EVERY 5 MIN PRN
Status: DISCONTINUED | OUTPATIENT
Start: 2024-08-30 | End: 2024-08-30 | Stop reason: HOSPADM

## 2024-08-30 RX ORDER — NALOXONE HYDROCHLORIDE 0.4 MG/ML
0.2 INJECTION, SOLUTION INTRAMUSCULAR; INTRAVENOUS; SUBCUTANEOUS
Status: DISCONTINUED | OUTPATIENT
Start: 2024-08-30 | End: 2024-09-06 | Stop reason: HOSPADM

## 2024-08-30 RX ORDER — DEXAMETHASONE SODIUM PHOSPHATE 4 MG/ML
4 INJECTION, SOLUTION INTRA-ARTICULAR; INTRALESIONAL; INTRAMUSCULAR; INTRAVENOUS; SOFT TISSUE
Status: DISCONTINUED | OUTPATIENT
Start: 2024-08-30 | End: 2024-08-30 | Stop reason: HOSPADM

## 2024-08-30 RX ORDER — HYDROMORPHONE HYDROCHLORIDE 1 MG/ML
0.2 INJECTION, SOLUTION INTRAMUSCULAR; INTRAVENOUS; SUBCUTANEOUS EVERY 5 MIN PRN
Status: DISCONTINUED | OUTPATIENT
Start: 2024-08-30 | End: 2024-08-30 | Stop reason: HOSPADM

## 2024-08-30 RX ORDER — HALOPERIDOL 5 MG/ML
1 INJECTION INTRAMUSCULAR
Status: DISCONTINUED | OUTPATIENT
Start: 2024-08-30 | End: 2024-08-30 | Stop reason: HOSPADM

## 2024-08-30 RX ORDER — NALOXONE HYDROCHLORIDE 0.4 MG/ML
0.4 INJECTION, SOLUTION INTRAMUSCULAR; INTRAVENOUS; SUBCUTANEOUS
Status: DISCONTINUED | OUTPATIENT
Start: 2024-08-30 | End: 2024-09-06 | Stop reason: HOSPADM

## 2024-08-30 RX ORDER — ONDANSETRON 2 MG/ML
4 INJECTION INTRAMUSCULAR; INTRAVENOUS EVERY 30 MIN PRN
Status: DISCONTINUED | OUTPATIENT
Start: 2024-08-30 | End: 2024-08-30 | Stop reason: HOSPADM

## 2024-08-30 RX ORDER — OXYCODONE HYDROCHLORIDE 5 MG/1
10 TABLET ORAL EVERY 4 HOURS PRN
Status: DISCONTINUED | OUTPATIENT
Start: 2024-08-30 | End: 2024-09-04

## 2024-08-30 RX ORDER — LABETALOL HYDROCHLORIDE 5 MG/ML
10 INJECTION, SOLUTION INTRAVENOUS
Status: DISCONTINUED | OUTPATIENT
Start: 2024-08-30 | End: 2024-08-30 | Stop reason: HOSPADM

## 2024-08-30 RX ORDER — PROPOFOL 10 MG/ML
INJECTION, EMULSION INTRAVENOUS PRN
Status: DISCONTINUED | OUTPATIENT
Start: 2024-08-30 | End: 2024-08-30

## 2024-08-30 RX ORDER — SODIUM CHLORIDE, SODIUM LACTATE, POTASSIUM CHLORIDE, CALCIUM CHLORIDE 600; 310; 30; 20 MG/100ML; MG/100ML; MG/100ML; MG/100ML
INJECTION, SOLUTION INTRAVENOUS CONTINUOUS PRN
Status: DISCONTINUED | OUTPATIENT
Start: 2024-08-30 | End: 2024-08-30

## 2024-08-30 RX ORDER — CEFAZOLIN SODIUM 1 G/3ML
1 INJECTION, POWDER, FOR SOLUTION INTRAMUSCULAR; INTRAVENOUS EVERY 8 HOURS
Status: DISCONTINUED | OUTPATIENT
Start: 2024-08-30 | End: 2024-09-04

## 2024-08-30 RX ORDER — ONDANSETRON 4 MG/1
4 TABLET, ORALLY DISINTEGRATING ORAL EVERY 6 HOURS PRN
Status: DISCONTINUED | OUTPATIENT
Start: 2024-08-30 | End: 2024-09-06 | Stop reason: HOSPADM

## 2024-08-30 RX ORDER — LIDOCAINE HYDROCHLORIDE ANHYDROUS AND DEXTROSE MONOHYDRATE .8; 5 G/100ML; G/100ML
1 INJECTION, SOLUTION INTRAVENOUS CONTINUOUS
Status: ACTIVE | OUTPATIENT
Start: 2024-08-30 | End: 2024-09-01

## 2024-08-30 RX ORDER — GABAPENTIN 300 MG/1
300 CAPSULE ORAL
Status: COMPLETED | OUTPATIENT
Start: 2024-08-30 | End: 2024-08-30

## 2024-08-30 RX ORDER — TRAZODONE HYDROCHLORIDE 50 MG/1
50 TABLET, FILM COATED ORAL
Status: DISCONTINUED | OUTPATIENT
Start: 2024-08-30 | End: 2024-09-06 | Stop reason: HOSPADM

## 2024-08-30 RX ORDER — GABAPENTIN 100 MG/1
300 CAPSULE ORAL
Status: DISCONTINUED | OUTPATIENT
Start: 2024-08-30 | End: 2024-08-30 | Stop reason: HOSPADM

## 2024-08-30 RX ORDER — FENTANYL CITRATE 50 UG/ML
25 INJECTION, SOLUTION INTRAMUSCULAR; INTRAVENOUS EVERY 5 MIN PRN
Status: DISCONTINUED | OUTPATIENT
Start: 2024-08-30 | End: 2024-08-30 | Stop reason: HOSPADM

## 2024-08-30 RX ORDER — VANCOMYCIN HYDROCHLORIDE 1 G/20ML
INJECTION, POWDER, LYOPHILIZED, FOR SOLUTION INTRAVENOUS PRN
Status: DISCONTINUED | OUTPATIENT
Start: 2024-08-30 | End: 2024-08-30 | Stop reason: HOSPADM

## 2024-08-30 RX ORDER — HYDROMORPHONE HYDROCHLORIDE 1 MG/ML
0.2 INJECTION, SOLUTION INTRAMUSCULAR; INTRAVENOUS; SUBCUTANEOUS
Status: DISCONTINUED | OUTPATIENT
Start: 2024-08-30 | End: 2024-09-06 | Stop reason: HOSPADM

## 2024-08-30 RX ORDER — AMOXICILLIN 250 MG
1 CAPSULE ORAL 2 TIMES DAILY
Status: DISCONTINUED | OUTPATIENT
Start: 2024-08-30 | End: 2024-09-03

## 2024-08-30 RX ORDER — POLYETHYLENE GLYCOL 3350 17 G/17G
17 POWDER, FOR SOLUTION ORAL DAILY
Status: DISCONTINUED | OUTPATIENT
Start: 2024-08-31 | End: 2024-09-03

## 2024-08-30 RX ORDER — LIDOCAINE HYDROCHLORIDE 20 MG/ML
INJECTION, SOLUTION INFILTRATION; PERINEURAL PRN
Status: DISCONTINUED | OUTPATIENT
Start: 2024-08-30 | End: 2024-08-30

## 2024-08-30 RX ORDER — PROPOFOL 10 MG/ML
INJECTION, EMULSION INTRAVENOUS CONTINUOUS PRN
Status: DISCONTINUED | OUTPATIENT
Start: 2024-08-30 | End: 2024-08-30

## 2024-08-30 RX ORDER — ACETAMINOPHEN 325 MG/1
975 TABLET ORAL EVERY 8 HOURS
Status: COMPLETED | OUTPATIENT
Start: 2024-08-30 | End: 2024-09-02

## 2024-08-30 RX ORDER — VITAMIN B COMPLEX
25 TABLET ORAL DAILY
Status: DISCONTINUED | OUTPATIENT
Start: 2024-08-30 | End: 2024-09-06 | Stop reason: HOSPADM

## 2024-08-30 RX ORDER — FENTANYL CITRATE 50 UG/ML
INJECTION, SOLUTION INTRAMUSCULAR; INTRAVENOUS PRN
Status: DISCONTINUED | OUTPATIENT
Start: 2024-08-30 | End: 2024-08-30

## 2024-08-30 RX ORDER — GLYCOPYRROLATE 0.2 MG/ML
INJECTION, SOLUTION INTRAMUSCULAR; INTRAVENOUS PRN
Status: DISCONTINUED | OUTPATIENT
Start: 2024-08-30 | End: 2024-08-30

## 2024-08-30 RX ORDER — LAMOTRIGINE 200 MG/1
200 TABLET ORAL EVERY MORNING
Status: DISCONTINUED | OUTPATIENT
Start: 2024-08-31 | End: 2024-09-06 | Stop reason: HOSPADM

## 2024-08-30 RX ORDER — MAGNESIUM SULFATE HEPTAHYDRATE 40 MG/ML
2 INJECTION, SOLUTION INTRAVENOUS ONCE
Status: COMPLETED | OUTPATIENT
Start: 2024-08-30 | End: 2024-08-30

## 2024-08-30 RX ORDER — HYDRALAZINE HYDROCHLORIDE 20 MG/ML
2.5-5 INJECTION INTRAMUSCULAR; INTRAVENOUS EVERY 10 MIN PRN
Status: DISCONTINUED | OUTPATIENT
Start: 2024-08-30 | End: 2024-08-30 | Stop reason: HOSPADM

## 2024-08-30 RX ORDER — ONDANSETRON 2 MG/ML
INJECTION INTRAMUSCULAR; INTRAVENOUS PRN
Status: DISCONTINUED | OUTPATIENT
Start: 2024-08-30 | End: 2024-08-30

## 2024-08-30 RX ORDER — HYDROMORPHONE HYDROCHLORIDE 1 MG/ML
0.4 INJECTION, SOLUTION INTRAMUSCULAR; INTRAVENOUS; SUBCUTANEOUS
Status: DISCONTINUED | OUTPATIENT
Start: 2024-08-30 | End: 2024-09-06 | Stop reason: HOSPADM

## 2024-08-30 RX ORDER — HYDROMORPHONE HYDROCHLORIDE 1 MG/ML
0.4 INJECTION, SOLUTION INTRAMUSCULAR; INTRAVENOUS; SUBCUTANEOUS EVERY 5 MIN PRN
Status: DISCONTINUED | OUTPATIENT
Start: 2024-08-30 | End: 2024-08-30 | Stop reason: HOSPADM

## 2024-08-30 RX ORDER — NALOXONE HYDROCHLORIDE 0.4 MG/ML
0.1 INJECTION, SOLUTION INTRAMUSCULAR; INTRAVENOUS; SUBCUTANEOUS
Status: DISCONTINUED | OUTPATIENT
Start: 2024-08-30 | End: 2024-08-30 | Stop reason: HOSPADM

## 2024-08-30 RX ORDER — FAMOTIDINE 20 MG/1
20 TABLET, FILM COATED ORAL 2 TIMES DAILY
Status: DISCONTINUED | OUTPATIENT
Start: 2024-08-30 | End: 2024-09-06 | Stop reason: HOSPADM

## 2024-08-30 RX ORDER — SODIUM CHLORIDE, SODIUM LACTATE, POTASSIUM CHLORIDE, CALCIUM CHLORIDE 600; 310; 30; 20 MG/100ML; MG/100ML; MG/100ML; MG/100ML
INJECTION, SOLUTION INTRAVENOUS CONTINUOUS
Status: DISCONTINUED | OUTPATIENT
Start: 2024-08-30 | End: 2024-08-30 | Stop reason: HOSPADM

## 2024-08-30 RX ORDER — ACETAMINOPHEN 325 MG/1
650 TABLET ORAL EVERY 4 HOURS PRN
Status: DISCONTINUED | OUTPATIENT
Start: 2024-09-02 | End: 2024-09-03

## 2024-08-30 RX ORDER — TOBRAMYCIN 1.2 G/30ML
INJECTION, POWDER, LYOPHILIZED, FOR SOLUTION INTRAVENOUS PRN
Status: DISCONTINUED | OUTPATIENT
Start: 2024-08-30 | End: 2024-08-30 | Stop reason: HOSPADM

## 2024-08-30 RX ORDER — OXYCODONE HYDROCHLORIDE 5 MG/1
5 TABLET ORAL EVERY 4 HOURS PRN
Status: DISCONTINUED | OUTPATIENT
Start: 2024-08-30 | End: 2024-09-04

## 2024-08-30 RX ORDER — BISACODYL 10 MG
10 SUPPOSITORY, RECTAL RECTAL DAILY PRN
Status: DISCONTINUED | OUTPATIENT
Start: 2024-08-30 | End: 2024-09-06 | Stop reason: HOSPADM

## 2024-08-30 RX ORDER — CEFAZOLIN SODIUM/WATER 2 G/20 ML
2 SYRINGE (ML) INTRAVENOUS
Status: COMPLETED | OUTPATIENT
Start: 2024-08-30 | End: 2024-08-30

## 2024-08-30 RX ORDER — DEXAMETHASONE SODIUM PHOSPHATE 4 MG/ML
INJECTION, SOLUTION INTRA-ARTICULAR; INTRALESIONAL; INTRAMUSCULAR; INTRAVENOUS; SOFT TISSUE PRN
Status: DISCONTINUED | OUTPATIENT
Start: 2024-08-30 | End: 2024-08-30

## 2024-08-30 RX ORDER — LIDOCAINE HYDROCHLORIDE ANHYDROUS AND DEXTROSE MONOHYDRATE .8; 5 G/100ML; G/100ML
1 INJECTION, SOLUTION INTRAVENOUS CONTINUOUS
Status: DISCONTINUED | OUTPATIENT
Start: 2024-08-30 | End: 2024-08-30

## 2024-08-30 RX ORDER — METHADONE HYDROCHLORIDE 10 MG/ML
0.2 INJECTION, SOLUTION INTRAMUSCULAR; INTRAVENOUS; SUBCUTANEOUS ONCE
Status: COMPLETED | OUTPATIENT
Start: 2024-08-30 | End: 2024-08-30

## 2024-08-30 RX ORDER — ONDANSETRON 2 MG/ML
4 INJECTION INTRAMUSCULAR; INTRAVENOUS EVERY 6 HOURS PRN
Status: DISCONTINUED | OUTPATIENT
Start: 2024-08-30 | End: 2024-09-06 | Stop reason: HOSPADM

## 2024-08-30 RX ORDER — CEFAZOLIN SODIUM/WATER 2 G/20 ML
2 SYRINGE (ML) INTRAVENOUS SEE ADMIN INSTRUCTIONS
Status: DISCONTINUED | OUTPATIENT
Start: 2024-08-30 | End: 2024-08-30 | Stop reason: HOSPADM

## 2024-08-30 RX ORDER — SODIUM CHLORIDE 9 MG/ML
INJECTION, SOLUTION INTRAVENOUS CONTINUOUS PRN
Status: DISCONTINUED | OUTPATIENT
Start: 2024-08-30 | End: 2024-08-30

## 2024-08-30 RX ORDER — CYCLOBENZAPRINE HCL 10 MG
10 TABLET ORAL 3 TIMES DAILY
Status: DISCONTINUED | OUTPATIENT
Start: 2024-08-30 | End: 2024-09-05

## 2024-08-30 RX ADMIN — PHENYLEPHRINE HYDROCHLORIDE 100 MCG: 10 INJECTION INTRAVENOUS at 11:57

## 2024-08-30 RX ADMIN — PHENYLEPHRINE HYDROCHLORIDE 100 MCG: 10 INJECTION INTRAVENOUS at 09:18

## 2024-08-30 RX ADMIN — ACETAMINOPHEN 975 MG: 325 TABLET ORAL at 16:34

## 2024-08-30 RX ADMIN — PHENYLEPHRINE HYDROCHLORIDE 100 MCG: 10 INJECTION INTRAVENOUS at 12:48

## 2024-08-30 RX ADMIN — ONDANSETRON 4 MG: 2 INJECTION INTRAMUSCULAR; INTRAVENOUS at 13:24

## 2024-08-30 RX ADMIN — PROPOFOL 100 MCG/KG/MIN: 10 INJECTION, EMULSION INTRAVENOUS at 11:15

## 2024-08-30 RX ADMIN — PHENYLEPHRINE HYDROCHLORIDE 150 MCG: 10 INJECTION INTRAVENOUS at 12:09

## 2024-08-30 RX ADMIN — PROPOFOL 150 MG: 10 INJECTION, EMULSION INTRAVENOUS at 07:48

## 2024-08-30 RX ADMIN — CYCLOBENZAPRINE 10 MG: 10 TABLET, FILM COATED ORAL at 20:27

## 2024-08-30 RX ADMIN — TRANEXAMIC ACID 1.85 G: 1 INJECTION, SOLUTION INTRAVENOUS at 08:14

## 2024-08-30 RX ADMIN — MAGNESIUM SULFATE HEPTAHYDRATE 2 G: 40 INJECTION, SOLUTION INTRAVENOUS at 09:01

## 2024-08-30 RX ADMIN — SENNOSIDES AND DOCUSATE SODIUM 1 TABLET: 50; 8.6 TABLET ORAL at 20:27

## 2024-08-30 RX ADMIN — PHENYLEPHRINE HYDROCHLORIDE 0.3 MCG/KG/MIN: 10 INJECTION INTRAVENOUS at 08:01

## 2024-08-30 RX ADMIN — PROPOFOL 50 MG: 10 INJECTION, EMULSION INTRAVENOUS at 08:45

## 2024-08-30 RX ADMIN — SUCCINYLCHOLINE CHLORIDE 60 MG: 20 INJECTION, SOLUTION INTRAMUSCULAR; INTRAVENOUS; PARENTERAL at 07:53

## 2024-08-30 RX ADMIN — ACETAMINOPHEN 975 MG: 325 TABLET ORAL at 06:28

## 2024-08-30 RX ADMIN — Medication 25 MCG: at 16:34

## 2024-08-30 RX ADMIN — PHENYLEPHRINE HYDROCHLORIDE 50 MCG: 10 INJECTION INTRAVENOUS at 09:33

## 2024-08-30 RX ADMIN — SODIUM CHLORIDE: 9 INJECTION, SOLUTION INTRAVENOUS at 08:14

## 2024-08-30 RX ADMIN — METHADONE HYDROCHLORIDE 11.76 MG: 10 INJECTION, SOLUTION INTRAMUSCULAR; INTRAVENOUS; SUBCUTANEOUS at 07:48

## 2024-08-30 RX ADMIN — PROPOFOL 125 MCG/KG/MIN: 10 INJECTION, EMULSION INTRAVENOUS at 07:52

## 2024-08-30 RX ADMIN — PHENYLEPHRINE HYDROCHLORIDE 200 MCG: 10 INJECTION INTRAVENOUS at 07:50

## 2024-08-30 RX ADMIN — GLYCOPYRROLATE 0.2 MG: 0.2 INJECTION, SOLUTION INTRAMUSCULAR; INTRAVENOUS at 11:57

## 2024-08-30 RX ADMIN — SODIUM CHLORIDE, POTASSIUM CHLORIDE, SODIUM LACTATE AND CALCIUM CHLORIDE: 600; 310; 30; 20 INJECTION, SOLUTION INTRAVENOUS at 08:55

## 2024-08-30 RX ADMIN — PHENYLEPHRINE HYDROCHLORIDE 100 MCG: 10 INJECTION INTRAVENOUS at 08:05

## 2024-08-30 RX ADMIN — MIDAZOLAM 2 MG: 1 INJECTION INTRAMUSCULAR; INTRAVENOUS at 07:40

## 2024-08-30 RX ADMIN — FAMOTIDINE 20 MG: 20 TABLET ORAL at 20:27

## 2024-08-30 RX ADMIN — Medication 10 MG: at 08:41

## 2024-08-30 RX ADMIN — PHENYLEPHRINE HYDROCHLORIDE 100 MCG: 10 INJECTION INTRAVENOUS at 11:49

## 2024-08-30 RX ADMIN — PHENYLEPHRINE HYDROCHLORIDE 100 MCG: 10 INJECTION INTRAVENOUS at 09:38

## 2024-08-30 RX ADMIN — PHENYLEPHRINE HYDROCHLORIDE 200 MCG: 10 INJECTION INTRAVENOUS at 12:27

## 2024-08-30 RX ADMIN — HYDROMORPHONE HYDROCHLORIDE 0.4 MG: 1 INJECTION, SOLUTION INTRAMUSCULAR; INTRAVENOUS; SUBCUTANEOUS at 14:19

## 2024-08-30 RX ADMIN — TRANEXAMIC ACID 10 MG/KG/HR: 1 INJECTION, SOLUTION INTRAVENOUS at 08:35

## 2024-08-30 RX ADMIN — SUGAMMADEX 200 MG: 100 INJECTION, SOLUTION INTRAVENOUS at 11:50

## 2024-08-30 RX ADMIN — ONDANSETRON 4 MG: 2 INJECTION INTRAMUSCULAR; INTRAVENOUS at 17:04

## 2024-08-30 RX ADMIN — Medication 2 G: at 12:00

## 2024-08-30 RX ADMIN — Medication 10 MG/HR: at 07:48

## 2024-08-30 RX ADMIN — CEFAZOLIN 1 G: 1 INJECTION, POWDER, FOR SOLUTION INTRAMUSCULAR; INTRAVENOUS at 20:26

## 2024-08-30 RX ADMIN — LIDOCAINE HYDROCHLORIDE 1 MG/KG/HR: 8 INJECTION, SOLUTION INTRAVENOUS at 07:52

## 2024-08-30 RX ADMIN — HYDROMORPHONE HYDROCHLORIDE 0.4 MG: 1 INJECTION, SOLUTION INTRAMUSCULAR; INTRAVENOUS; SUBCUTANEOUS at 16:34

## 2024-08-30 RX ADMIN — ONDANSETRON 4 MG: 2 INJECTION INTRAMUSCULAR; INTRAVENOUS at 11:50

## 2024-08-30 RX ADMIN — FENTANYL CITRATE 100 MCG: 50 INJECTION INTRAMUSCULAR; INTRAVENOUS at 07:48

## 2024-08-30 RX ADMIN — LIDOCAINE HYDROCHLORIDE 100 MG: 20 INJECTION, SOLUTION INFILTRATION; PERINEURAL at 07:48

## 2024-08-30 RX ADMIN — SODIUM CHLORIDE, POTASSIUM CHLORIDE, SODIUM LACTATE AND CALCIUM CHLORIDE: 600; 310; 30; 20 INJECTION, SOLUTION INTRAVENOUS at 12:02

## 2024-08-30 RX ADMIN — FENTANYL CITRATE 50 MCG: 50 INJECTION INTRAMUSCULAR; INTRAVENOUS at 08:45

## 2024-08-30 RX ADMIN — PHENYLEPHRINE HYDROCHLORIDE 50 MCG: 10 INJECTION INTRAVENOUS at 11:17

## 2024-08-30 RX ADMIN — HYDROMORPHONE HYDROCHLORIDE 0.5 MG: 1 INJECTION, SOLUTION INTRAMUSCULAR; INTRAVENOUS; SUBCUTANEOUS at 10:19

## 2024-08-30 RX ADMIN — HYDROMORPHONE HYDROCHLORIDE 0.5 MG: 1 INJECTION, SOLUTION INTRAMUSCULAR; INTRAVENOUS; SUBCUTANEOUS at 08:36

## 2024-08-30 RX ADMIN — GABAPENTIN 300 MG: 300 CAPSULE ORAL at 06:28

## 2024-08-30 RX ADMIN — DEXAMETHASONE SODIUM PHOSPHATE 8 MG: 4 INJECTION, SOLUTION INTRAMUSCULAR; INTRAVENOUS at 08:34

## 2024-08-30 RX ADMIN — SODIUM CHLORIDE: 9 INJECTION, SOLUTION INTRAVENOUS at 10:50

## 2024-08-30 RX ADMIN — PROCHLORPERAZINE EDISYLATE 10 MG: 5 INJECTION INTRAMUSCULAR; INTRAVENOUS at 22:19

## 2024-08-30 RX ADMIN — Medication 10 MG/HR: at 11:15

## 2024-08-30 RX ADMIN — LIDOCAINE HYDROCHLORIDE 1 MG/KG/HR: 8 INJECTION, SOLUTION INTRAVENOUS at 15:15

## 2024-08-30 RX ADMIN — PHENYLEPHRINE HYDROCHLORIDE 50 MCG: 10 INJECTION INTRAVENOUS at 09:31

## 2024-08-30 RX ADMIN — SODIUM CHLORIDE, POTASSIUM CHLORIDE, SODIUM LACTATE AND CALCIUM CHLORIDE: 600; 310; 30; 20 INJECTION, SOLUTION INTRAVENOUS at 07:42

## 2024-08-30 RX ADMIN — OXYCODONE HYDROCHLORIDE 5 MG: 5 TABLET ORAL at 15:46

## 2024-08-30 RX ADMIN — Medication 2 G: at 08:00

## 2024-08-30 RX ADMIN — SODIUM CHLORIDE: 9 INJECTION, SOLUTION INTRAVENOUS at 09:36

## 2024-08-30 ASSESSMENT — ACTIVITIES OF DAILY LIVING (ADL)
ADLS_ACUITY_SCORE: 24
ADLS_ACUITY_SCORE: 20
ADLS_ACUITY_SCORE: 24
ADLS_ACUITY_SCORE: 20

## 2024-08-30 ASSESSMENT — LIFESTYLE VARIABLES: TOBACCO_USE: 1

## 2024-08-30 ASSESSMENT — ENCOUNTER SYMPTOMS: SEIZURES: 0

## 2024-08-30 NOTE — ANESTHESIA PROCEDURE NOTES
Airway       Patient location during procedure: OR       Procedure Start/Stop Times: 8/30/2024 7:54 AM  Staff -        Anesthesiologist:  Ambika Saravia MD       CRNA: Arlette Perez APRN CRNA       Performed By: CRNA  Consent for Airway        Urgency: elective  Indications and Patient Condition       Indications for airway management: irina-procedural       Induction type:intravenous       Mask difficulty assessment: 1 - vent by mask    Final Airway Details       Final airway type: endotracheal airway       Successful airway: ETT - single  Endotracheal Airway Details        ETT size (mm): 7.0       Successful intubation technique: direct laryngoscopy       DL Blade Type: MAC 3       Position: Right       Measured from: gums/teeth       Secured at (cm): 21       Bite block used: Molar    Post intubation assessment        Placement verified by: capnometry, equal breath sounds and chest rise        Number of attempts at approach: 1       Number of other approaches attempted: 0       Secured with: tape       Ease of procedure: easy       Dentition: Intact    Medication(s) Administered   Medication Administration Time: 8/30/2024 7:54 AM

## 2024-08-30 NOTE — PROGRESS NOTES
PACU to Inpatient Nursing Handoff    Patient Khoa Cedeño is a 44 year old female who speaks English.   Procedure Procedure(s):  Thoracic 10 to Lumbar 4 Instrumented Posterior Interbody Fusion and Nicole Woodson Osteotomy with O-Arm/Stealth Navigation, use of allograft, local autograft, and bone morphogenic protein   Surgeon(s) Primary: Hansel Hernandez MD  Assisting: Bishop GEGE Ziegler PA-C  Fellow - Assisting: Justyn Hernández MD     Allergies   Allergen Reactions    Prochlorperazine Dizziness       Isolation  No active isolations     Past Medical History   has a past medical history of Adolescent idiopathic scoliosis of thoracolumbar region, Depression, and PTSD (post-traumatic stress disorder).    Anesthesia General   Dermatome Level     Preop Meds acetaminophen (Tylenol) - time given: 0630  gabapentin (Neurontin) - time given: 0630   Nerve block Not applicable   Intraop Meds midazolam 1 mg/mL (mg)   Total dose: 2 mg  Date/Time Rate/Dose/Volume Action Route Admin User Audit    08/30/24 0740 2 mg Given Intravenous Arlette Perez APRN CRNA     fentaNYL 50 mcg/mL (mcg)   Total dose: 150 mcg  Date/Time Rate/Dose/Volume Action Route Admin User Audit    08/30/24 0748 100 mcg Given Intravenous Arlette Perez APRN CRNA edited    0845 50 mcg Given Intravenous Arlette Perez APRN CRNA     HYDROmorphone 1 mg/mL (mg)   Total dose: 1 mg  Date/Time Rate/Dose/Volume Action Route Admin User Audit    08/30/24 0836 0.5 mg Given Intravenous Arlette Perez APRN CRNA     1019 0.5 mg Given Intravenous Arlette Perez APRN CRNA     lidocaine 2% (mg)   Total dose: 100 mg  Date/Time Rate/Dose/Volume Action Route Admin User Audit    08/30/24 0748 100 mg Given Intravenous Arlette Perez APRN CRNA edited    propofol 10 mg/mL (mg)   Total dose: 200 mg  Date/Time Rate/Dose/Volume Action Route Admin User Audit    08/30/24 0748 150 mg (over 7 min) Given Intravenous Arlette Perez APRN  CRNA edited    0845 50 mg Given Intravenous CablerArlette, APRN CRNA     propofol drip mcg/kg/min (mcg/kg/min)   Total dose: 1,704.78 mg Dosing weight: 61.6  Date/Time Rate/Dose/Volume Action Route Admin User Audit    08/30/24 0752 125 mcg/kg/min - 46.2 mL/hr New Bag Intravenous CablerArletteBIJU CRNA     0843 150 mcg/kg/min - 55.44 mL/hr Rate Change Intravenous CablerArletteBIJU CRNA     0928 100 mcg/kg/min - 36.96 mL/hr Rate Change Intravenous Cabler, Arlette CHANDLERBIJU CRNA     1018 125 mcg/kg/min - 46.2 mL/hr Rate Change Intravenous Cabler, Arlette CHANDLERBIJU CRNA     1044 100 mcg/kg/min - 36.96 mL/hr Rate Change Intravenous CablerArletteBIJU CRNA     1115 100 mcg/kg/min - 36.96 mL/hr New Bag Intravenous CablerArletteBIJU CRNA     1123 125 mcg/kg/min - 46.2 mL/hr Rate Change Intravenous Cabler, Arlette CHANDLERBIJU CRNA     1131 100 mcg/kg/min - 36.96 mL/hr Rate Change Intravenous Cabler, Arlette CHANDLERBIJU CRNA     1145  Stopped Intravenous CablerArletteBIJU CRNA     succinylcholine 20 mg/mL (mg)   Total dose: 60 mg  Date/Time Rate/Dose/Volume Action Route Admin User Audit    08/30/24 0753 60 mg Given Intravenous AngerMarcelinoBIJU Jones CRNA edited    phenylephrine (EDUIN-SYNEPHRINE) injection (mcg)   Total dose: 1,300 mcg  Date/Time Rate/Dose/Volume Action Route Admin User Audit    08/30/24 0750 200 mcg New Bag Intravenous CablerArletteBIJU CRNA     0805 100 mcg Bolus Intravenous CablerArletteBIJU CRNA     0918 100 mcg New Bag Intravenous Cabler, Arlette CHANDLERBIJU CRNA     0931 50 mcg Bolus Intravenous CablerArletteBIJU CRNA     0933 50 mcg Bolus Intravenous CablerArletteBIJU CRNA     0938 100 mcg Bolus Intravenous Cabler, BIJU Burt CRNA     1117 50 mcg Bolus Intravenous Cabler, BIJU Burt CRNA     1149 100 mcg Bolus Intravenous Cabler, BIJU Burt CRNA     1157 100 mcg Bolus Intravenous Cabler, BIJU Burt CRNA      1209 150 mcg Bolus Intravenous CablerArletteBIJU CRNA     1227 200 mcg Bolus Intravenous CablerArletteBIJU CRNA     1248 100 mcg Bolus Intravenous CablerArletteBIJU CRNA     ondansetron 2 mg/mL (mg)   Total dose: 4 mg  Date/Time Rate/Dose/Volume Action Route Admin User Audit    08/30/24 1150 4 mg Given Intravenous AngerArletteBIJU CRNA     glycopyrrolate 0.2 mg/mL (mg)   Total dose: 0.2 mg  Date/Time Rate/Dose/Volume Action Route Admin User Audit    08/30/24 1157 0.2 mg Given Intravenous CablerArletteBIJU CRNA     sugammadex (BRIDION) 200mg/2mL (mg)   Total dose: 200 mg  Date/Time Rate/Dose/Volume Action Route Admin User Audit    08/30/24 1150 200 mg Given Intravenous AngerArletteBIJU CRNA     phenylephrine 0.1 mg/mL infusion (mcg/kg/min) (mcg/kg/min)   Total dose: 3.1 mg Dosing weight: 61.6  Date/Time Rate/Dose/Volume Action Route Admin User Audit    08/30/24 0801 0.3 mcg/kg/min - 11.088 mL/hr New Bag Intravenous CablerArletteBIJU CRNA     0814 0.2 mcg/kg/min - 7.392 mL/hr Rate Change Intravenous CablerArletteBIJU CRNA     0847  Stopped Intravenous CablerArletteBIJU CRNA     0919 0.2 mcg/kg/min - 7.392 mL/hr Restarted Intravenous CablerArletteBIJU CRNA     0923  Stopped Intravenous CablerArletteBIJU CRNA     0929 0.1 mcg/kg/min - 3.696 mL/hr Restarted Intravenous CablerArletteBIJU CRNA     0933 0.2 mcg/kg/min - 7.392 mL/hr Rate Change Intravenous CablerArletteBIJU CRNA     0936 0.3 mcg/kg/min - 11.088 mL/hr Rate Change Intravenous CablerArletteBIJU CRNA     0952 0.25 mcg/kg/min - 9.24 mL/hr Rate Change Intravenous Cabler, Arlette M, APRN CRNA     1155 0.4 mcg/kg/min - 14.784 mL/hr Rate Change Intravenous Arlette Perez APRN CRNA     1201  Stopped Intravenous Arlette Perez APRN CRNA     ceFAZolin Sodium (ANCEF) injection 2 g (g)   Total dose: 4 g Dosing weight: 61.7  Date/Time  Rate/Dose/Volume Action Route Admin User Audit    08/30/24 0800 2 g Given Intravenous CablerArlette APRN CRNA edited    1200 2 g Given Intravenous CablerArlette APRN CRNA     dexAMETHasone (DECADRON) 4 mg/mL (mg)   Total dose: 8 mg  Date/Time Rate/Dose/Volume Action Route Admin User Audit    08/30/24 0834 8 mg Given Intravenous Arlette Perez APRN CRNA     methadone (DOLOPHINE) injection 11.76 mg (mg)   Total dose: 11.76 mg Dosing weight: 58.8  Date/Time Rate/Dose/Volume Action Route Admin User Audit    08/30/24 0748 11.76 mg Given Intravenous Ambika Saravia MD edited    tranexamic acid (CYKLOKAPRON) 1,850 mg in sodium chloride 0.9 % 50 mL bolus (g)   Total dose: 1.85 g Dosing weight: 61.7  Date/Time Rate/Dose/Volume Action Route Admin User Audit    08/30/24 0814 1.85 g (over 20 min) New Bag Intravenous Arlette Perez APRN CRNA edited    tranexamic acid (CYKLOKAPRON) 5 g in sodium chloride 0.9 % 250 mL infusion (mg/kg/hr)   Total dose: 2,087.52 mg Dosing weight: 61.7  Date/Time Rate/Dose/Volume Action Route Admin User Audit    08/30/24 0835 10 mg/kg/hr - 30.85 mL/hr New Bag Intravenous CablerArlette APRN CRNA edited    1158  Stopped Intravenous Arlette Perez APRN CRNA     lidocaine 2g/250 mL D5W (ADULT STD PREMIX) ANALGESIA (mg/kg/hr)   Total dose: 228 mg Dosing weight: 57  Date/Time Rate/Dose/Volume Action Route Admin User Audit    08/30/24 0752 1 mg/kg/hr - 7.125 mL/hr New Bag Intravenous CablerArlette APRN CRNA edited    1152  Stopped Intravenous Arlette Perez APRN CRNA     rocuronium 10 mg/mL (mg)   Total dose: 10 mg  Date/Time Rate/Dose/Volume Action Route Admin User Audit    08/30/24 0841 10 mg Given Intravenous Anger, Arlette M, APRN CRNA     magnesium sulfate 2 g in 50 mL sterile water intermittent infusion (g)   Total dose: 2 g Dosing weight: 61.6  Date/Time Rate/Dose/Volume Action Route Admin User Audit    08/30/24 0901 2 g Given  "Intravenous CablerArlette APRN CRNA edited    ketamine 1 mg/mL infusion (mg/hr)   Total dose: 51.5 mg  Date/Time Rate/Dose/Volume Action Route Admin User Audit    08/30/24 0748 10 mg/hr - 10 mL/hr New Bag Intravenous Arlette Perez APRN CRNA edited    Comment: Infusing at 10mg/h via syringe pump using Ketamine syringe containing 10mg/ml.     1115 10 mg/hr - 10 mL/hr New Bag Intravenous Arlette Perez APRN CRNA     LR (mL)   Total volume: 2,000 mL  Date/Time Rate/Dose/Volume Action Route Admin User Audit    08/30/24 0742  New Bag Intravenous CablerArlette APRN CRNA     0855 1,000 mL New Bag Intravenous CablerArlette APRN CRNA     1202 1,000 mL New Bag Intravenous CablerArlette APRN CRNA     0.9% NS (mL)   Total volume: 1,400 mL  Date/Time Rate/Dose/Volume Action Route Admin User Audit    08/30/24 0814  New Bag Intravenous CablerArlette APRN CRNA     0936 500 mL New Bag Intravenous CablerArlette APRN CRNA     1050 500 mL New Bag Intravenous CablerArlette APRN CRNA     1205 400 mL Anesthesia Volume Adjustment          Local Meds No   Antibiotics cefazolin (Ancef) - last given at 1200     Pain Patient Currently in Pain: yes   PACU meds  hydromorphone (Dilaudid): 0.4 mg (total dose) last given at 1419   ondansetron (Zofran): 4 mg (total dose) last given at 1324   Lidocaine drip at 1 mg/kg/hr   PCA / epidural No   Capnography     Telemetry ECG Rhythm: Normal sinus rhythm   Inpatient Telemetry Monitor Ordered? No        Labs Glucose Lab Results   Component Value Date    GLC 86 08/30/2024       Hgb Lab Results   Component Value Date    HGB 11.9 08/05/2024       INR No results found for: \"INR\"   PACU Imaging Not applicable     Wound/Incision Incision/Surgical Site 08/30/24 Lower Thoracic spine (Active)   Incision Assessment UTV 08/30/24 1320   Dressing Dry gauze;Transparent film (Opsite, Tegaderm) 08/30/24 1320   Closure Sutures;Other (Comment) 08/30/24 1225 "   Incision Drainage Amount None 08/30/24 1320   Incision Care Ice applied 08/30/24 1320   Dressing Intervention Clean, dry, intact 08/30/24 1320   Number of days: 0      CMS        Equipment ice pack and 2 accordion drains   Other LDA       IV Access Peripheral IV 08/30/24 Right Hand (Active)   Site Assessment WD 08/30/24 1255   Line Status Saline locked 08/30/24 1255   Dressing Transparent 08/30/24 1255   Dressing Status clean;dry;intact 08/30/24 1255   Phlebitis Scale 0-->no symptoms 08/30/24 1255   Infiltration? no 08/30/24 1255   Number of days: 0       Peripheral IV 08/30/24 Right Upper forearm (Active)   Site Assessment WD 08/30/24 1255   Line Status Infusing 08/30/24 1255   Dressing Transparent 08/30/24 1255   Dressing Status clean;dry;intact 08/30/24 1255   Phlebitis Scale 0-->no symptoms 08/30/24 1255   Infiltration? no 08/30/24 1255   Number of days: 0       Arterial Line 08/30/24 Radial (Active)   Site Assessment WD 08/30/24 1255   Line Status Pulsatile blood flow 08/30/24 1255   Art Line Waveform Appropriate 08/30/24 1255   Art Line Interventions Zeroed and calibrated 08/30/24 1255   Color/Movement/Sensation Capillary refill less than 3 sec 08/30/24 1255   Line Necessity Yes, meets criteria 08/30/24 1255   Dressing Type Transparent 08/30/24 1255   Dressing Status Clean, dry, intact 08/30/24 1255   Number of days: 0      Blood Products Cell saver  mL   Intake/Output Date 08/30/24 0700 - 08/31/24 0659   Shift 0561-3666 0179-9805 9405-8948 24 Hour Total   INTAKE   P.O. 90   90   I.V. 3400   3400   Other 260   260   Shift Total(mL/kg) 3750(60.88)   3750(60.88)   OUTPUT   Urine 100   100   Drains 100   100   Blood 750   750   Shift Total(mL/kg) 950(15.42)   950(15.42)   Weight (kg) 61.6 61.6 61.6 61.6      Drains / Monet Closed/Suction Drain 1 Left Back Accordion 10 Turkish placed to the upper left of incision - deep (Active)   Site Description [facility] 08/30/24 0689   Dressing Status Normal: Clean, Dry &  Intact 08/30/24 1255   Drainage Appearance Bloody/Bright Red 08/30/24 1415   Status To bulb suction 08/30/24 1415   Output (ml) 100 ml 08/30/24 1415   Number of days: 0       Closed/Suction Drain 2 Medial Back Accordion 10 Welsh (Active)   Site Description [facility] 08/30/24 1415   Dressing Status Normal: Clean, Dry & Intact 08/30/24 1255   Drainage Appearance Bloody/Bright Red 08/30/24 1255   Status To bulb suction 08/30/24 1415   Output (ml) 0 ml 08/30/24 1415   Number of days: 0       Urethral Catheter 08/30/24 Latex;Double-lumen;Straight-tip 16 fr (Active)   Collection Container Standard 08/30/24 1255   Securement Method Securing device (Describe) 08/30/24 1255   Rationale for Continued Use Surgical procedure 08/30/24 1255   Number of days: 0      Time of void PreOp Time of Void Prior to Procedure: 0624 (08/30/24 0623)    PostOp Voided (mL): 100 mL (jacobs) (08/30/24 1415)    Diapered? No   Bladder Scan     PO 30 mL (08/30/24 1415)  tolerating sips     Vitals    B/P: 116/70  T: 97  F (36.1  C)    Temp src: Axillary  P:  Pulse: 80 (08/30/24 1445)          R: 10  O2:  SpO2: 100 %    O2 Device: Nasal cannula (08/30/24 1445)    Oxygen Delivery: 2 LPM (08/30/24 1445)         Family/support present No family with pt   Patient belongings     Patient transported on bed   DC meds/scripts (obs/outpt) Not applicable   Inpatient Pain Meds Released? Yes       Special needs/considerations None   Tasks needing completion None       Deanna Wright, JOYCE

## 2024-08-30 NOTE — OP NOTE
DATE OF SURGERY: 8/30/2024    PREOPERATIVE DIAGNOSIS:   Adolescent idiopathic scoliosis of thoracolumbar region [M41.125]               POSTOPERATIVE DIAGNOSIS:   Same    PROCEDURES:  1. T12-L3 through Nicole Woodson Osteotomies (Grade 2) for complete neurologic decompression and regional deformity correction.  2. T10-12 and L3-4 Schwabb Grade 1 Osteotomies (Dorsal Facetectomies) for regional deformity correction.  3. T10 through L4 Posterior Spinal Fusion.  4. T10 through L4 Posterior Spinal Instrumentation, Medtronic Solera.  5. [place] and use of Local Autograft.  6. Use of O-Arm navigational guidance.  7. Use of Allograft and Bone Morphogenic Protein    PRIMARY SURGEON: Hansel Hernandez MD    FIRST ASSISTANT: Justyn Valladares, Fellow Surgeon, The assistant was necessary for all phases of the operation, including discectomy, instrumentation, decompression,  placement of the interbody devices, and all bone work and also for visualization, and closure.  There was no qualified resident available.      ANESTHESIA: General Endotracheal    COMPLICATIONS:  None.    SPECIMENS: None.    ESTIMATED BLOOD LOSS: 750cc    INDICATIONS:                          Khoa Cedeño is a 44 year old female who elected surgical treatment, and understood the indications for this surgery, as well as its risks, benefits, and alternatives as documented in the pre-operative H&P.  Specifically, we reviewed the risks and benefits of the surgery in detail. The risks include, but are not limited to, the general risks associated with anesthesia, including death, pulmonary embolism, DVT, stroke, myocardial infarction, pneumonia, and urinary tract infection. Additional risks specific to the surgery include the risk of infection, failure to heal (non-union), dural tear with resultant CSF leak which might necessitate placement of a drain or revision surgery or could result in headaches, nerve injury resulting in weakness or paralysis, risk of  adjacent segment disease, the risks of vascular injury, need for revision surgery in the future due to one of the above issues, or risk of incomplete symptom relief. Khoa Cedeño understands the risks of the surgery and wishes to proceed. No guarantees were given.    DESCRIPTION OF PROCEDURE:           Khoa Cedeño was taken to the operating  room, where the Anesthesiology Service induced satisfactory general anesthesia.  Ancef was given IV.  Venous thromboembolic prophylaxis was performed with sequential devices.  A Monet catheter was placed under standard sterile techniques.  The patient was placed prone on an open OSI frame with the abdomen hanging free and all bony prominences well padded.  The low back was then prepped and draped in its entirety in the usual sterile fashion. We then held a multidisciplinary time out in which we verified the patient, procedure, antibiotics, and operative plan.  All team members were in agreement.    The incision was carried out from T10 to L3 with subperiosteal dissection out to the tips of the transverse processes. Intraoperative radiographic localization of the levels was again confirmed using fluoroscopy.      We then turned our attention to the placement of pedicle screws.  The O-Arm was brought in and draped.  The reference frame was attached to the spinous process at L4.  We then performed our O-Arm spin.  After appropriate verification, the screws were placed at each level bilaterally from T10 through L4 in the following fashion: We used the clawfoot to identify the starting point.  A high-speed bur was used to penetrate the dorsal cortex.  We then used the gold handle pedicle probe to cannulate the pedicle while referencing the navigation screen for guidance.  The navigation system was used to measure the pedicle diameter and length, and an appropriate Medtronic Solera screw size was chosen.  We under tapped by 1mm.  The screw was then placed under manual power.   This was done bilaterally at each of the instrumented levels.      The O-Arm was brought back in and we performed a verification spin.  The resultant CT showed the instrumentation to be completely intra-osseous with no obvious breaches or malposition.  This also verified that the correct level had been operated upon.     We used sagittal plane screws on the right and poly on the left.    We then turned our attention to the interbody fusion and Smith-Márquez osteotomy portion of the procedure.  This was peformed T12-L1, L1-2, L2-3.  I used a narrow rongure to remove the interspinous ligament.  I then used an osteotome to remove the bilateral inferior articular processes.  I then split the ligamentum flavum in the midline.  I used a Kerrison to resect it in a piecemeal fashion.  We carried the dissection out through the foramen I remove the superior articular process down flush with the caudal pedicle.  This completed the removal to bilateral facet joints, which is a Smith-Márquez osteotomy, which allowed for complete neurologic decompression as well as regional deformity correction at the end of the case.  I then entered the patient's right-sided disc space while protecting the traversing nerve root.  We thoroughly cleaned it out with turn and rotate distractors pituitaries and scrapers and turn and rotate scrapers to help release the right side of the disc for additional deformity correction.      I then turned my attention to the dorsal facetectomies, which are schwab grade 1 osteotomies.  This was done at T10-11, T11-12, L3-4.  We used a narrow ronguer to remove the interpinsous ligament, and then an osteotome to remove the bilateral inferior articular processeses.  We then thinned the ligamentum flavum in the midline.  This completed the dorsal joint mobilization which allowed for additional deformity correction.      We then measured and cut an apex tara.  I used reduction towers to pull the spine up to the tara  on the right side.  We then used a derotation maneuver.  We went about doing compression and distraction to aid in the correction.  We took a series of stitched images and then repeated the correction maneuvers.  I was then satisfied with the deformity correction.      I then decorticated the remaining midline laminar bone between T10 and L4 levels.  This is packed with the remaining local autograft as well as crushed cancellous allograft as a bone extender, to complete the posterior fusion from T10 and L4 levels.    We then achieved hemostasis.  A hemovac was placed deep and a hemovac drain was placed above the fascia and 1 gram of vancomycin powder was placed into the wound.  We closed the wound in multiple layers of interrupted Vicryl, with monocryl and dermabond for the skin.  Upon closure, the patient was then transferred to a hospital bed and taken to recovery after extubation in stable condition.    I was present and scrubbed for the critical portions of the procedure, including patient positioning, the neurologic decompression, placement of instrumentation, performance of fusion and grafting, and verification of the instrumentation.    Hansel Hernandez MD

## 2024-08-30 NOTE — ANESTHESIA PREPROCEDURE EVALUATION
Pre-Operative H & P     CC:  Preoperative exam to assess for increased cardiopulmonary risk while undergoing surgery and anesthesia.    Date of Encounter: 2024  Primary Care Physician:  Audrey Carlson     Reason for visit:   No diagnosis found.      HPI  Khoa Cedeño is a 44 year old female who presents for pre-operative H & P in preparation for  Procedure Information       Case: [id number] Date/Time: 24 0730    Procedure: Thoracic 10 to Lumbar 4 Instrumented Posterior Interbody Fusion and Nicole Woodson Osteotomy with O-Arm/Stealth Navigation, use of allograft, local autograft, and possible bone morphogenic protein (Spine)    Anesthesia type: General    Diagnosis: Adolescent idiopathic scoliosis of thoracolumbar region [M41.125]    Pre-op diagnosis: Adolescent idiopathic scoliosis of thoracolumbar region [M41.125]    Location:  OR  OR    Providers: Hansel Hernandez MD            Patient is being evaluated for comorbid conditions of depression, PTSD.    Ms. Cedeño was diagnosed with scoliosis at age 21. She reports having some pain in her low back at that time however it is worse over the last 3 to 4 years. She has never done any brace therapy, no surgical intervention. Recent imaging is notable for a scoliotic deformity, major lumbar curve measures 60 degree Saunders angle, which is a progression from 40 degrees as compared to L-spine x-rays dated 2017. She now presents for the above procedure.      History is obtained from the patient and chart review    Hx of abnormal bleeding or anti-platelet use: denies    Menstrual history: No LMP recorded.:       Past Medical History  Past Medical History:   Diagnosis Date    Adolescent idiopathic scoliosis of thoracolumbar region     Depression     PTSD (post-traumatic stress disorder)        Past Surgical History  Past Surgical History:   Procedure Laterality Date    ABDOMINOPLASTY       SECTION      x5    SALPINGECTOMY      during         Prior to Admission Medications  No current outpatient medications on file.       Allergies  Allergies   Allergen Reactions    Prochlorperazine Dizziness       Social History  Social History     Socioeconomic History    Marital status:      Spouse name: Not on file    Number of children: Not on file    Years of education: Not on file    Highest education level: Not on file   Occupational History    Not on file   Tobacco Use    Smoking status: Former     Types: Cigarettes    Smokeless tobacco: Never   Substance and Sexual Activity    Alcohol use: Yes     Comment: 2 glasses of wine twice a week    Drug use: Never    Sexual activity: Not on file   Other Topics Concern    Not on file   Social History Narrative    Not on file     Social Determinants of Health     Financial Resource Strain: Not on file   Food Insecurity: Not on file   Transportation Needs: Not on file   Physical Activity: Not on file   Stress: Not on file   Social Connections: Not on file   Interpersonal Safety: Low Risk  (2024)    Interpersonal Safety     Do you feel physically and emotionally safe where you currently live?: Yes     Within the past 12 months, have you been hit, slapped, kicked or otherwise physically hurt by someone?: No     Within the past 12 months, have you been humiliated or emotionally abused in other ways by your partner or ex-partner?: No   Housing Stability: Not on file       Family History  Family History   Problem Relation Age of Onset    Anesthesia Reaction No family hx of     Deep Vein Thrombosis (DVT) No family hx of        Review of Systems  The complete review of systems is negative other than noted in the HPI or here.     Anesthesia Evaluation   Pt has had prior anesthetic.     History of anesthetic complications   Pt reports difficulty w/ spinal epidural placement 2/2 scoliosis.    ROS/MED HX  ENT/Pulmonary:     (+)                tobacco use (quit ), Past use,                    (-)  "asthma, sleep apnea and recent URI   Neurologic:  - neg neurologic ROS  (-) no seizures and no CVA   Cardiovascular:     (+)  - -   -  - -                           valvular problems/murmurs  known murmur.         METS/Exercise Tolerance: 4 - Raking leaves, gardening    Hematologic:  - neg hematologic  ROS  (-) history of blood clots and history of blood transfusion   Musculoskeletal: Comment: Scoliosis        GI/Hepatic:  - neg GI/hepatic ROS  (-) GERD and liver disease   Renal/Genitourinary:  - neg Renal ROS  (-) renal disease   Endo:  - neg endo ROS  (-) Type II DM   Psychiatric/Substance Use: Comment: PTSD    (+) psychiatric history depression       Infectious Disease:  - neg infectious disease ROS     Malignancy:  - neg malignancy ROS     Other:  - neg other ROS          /72 (BP Location: Left arm)   Pulse 80   Temp 36.4  C (97.6  F) (Oral)   Resp 16   Ht 1.651 m (5' 5\")   Wt 61.6 kg (135 lb 12.9 oz)   SpO2 100%   BMI 22.60 kg/m      Physical Exam  Constitutional: Awake, alert, cooperative, no apparent distress, and appears stated age.  Eyes: Pupils equal, round and reactive to light, extra ocular muscles intact, sclera clear, conjunctiva normal.  HENT: Normocephalic, oral pharynx with moist mucus membranes, good dentition. No goiter appreciated. No removable dental hardware.  Respiratory: Clear to auscultation bilaterally, no crackles or wheezing. No SOB when supine.  Cardiovascular: Regular rate and rhythm, normal S1 and S2, and no murmur noted.  Carotids +2, no bruits. No edema. Palpable pulses to radial, DP and PT arteries.   GI: Normal bowel sounds, soft, non-distended, non-tender, no masses palpated.    Lymph/Hematologic: No cervical lymphadenopathy and no supraclavicular lymphadenopathy.  Genitourinary:  deferred  Skin: Warm and dry.  No rashes.   Musculoskeletal: full ROM of neck. There is no redness, warmth, or swelling of the joints. Gross motor strength is normal.    Neurologic: Awake, " "alert, oriented to name, place and time. Cranial nerves II-XII are grossly intact. Gait is normal. Ambulates from chair to exam table, seats self, lies supine and sits back up w/o assistance.  Neuropsychiatric: Calm, cooperative. Normal affect. Pleasant. Answers questions appropriately, follows commands w/o difficulty.        PRIOR LABS/DIAGNOSTIC STUDIES:    All labs and imaging personally reviewed        The patient's records and results personally reviewed by this provider.     LAB/DIAGNOSTIC STUDIES TODAY:  BMP, CBC, T&S    Assessment    Khoa Cedeño is a 44 year old female seen as a PAC referral for risk assessment and optimization for anesthesia.    Plan/Recommendations  Pt will be optimized for the proposed procedure.  See below for details on the assessment, risk, and preoperative recommendations    NEUROLOGY  - No history of TIA, CVA or seizure    -Post Op delirium risk factors:  No risk identified    ENT  - No current airway concerns.  Will need to be reassessed day of surgery.  Mallampati: III  TM: > 3    CARDIAC  - No history of CAD, Hypertension, and Afib  - METS (Metabolic Equivalents)  Patient performs 4 or more METS exercise without symptoms             Total Score: 0      RCRI-Low risk: Class 2 0.9% complication rate             Total Score: 1    RCRI: High Risk Surgery        PULMONARY  SHAYY Low Risk             Total Score: 0      - Denies asthma or inhaler use  - Tobacco History    History   Smoking Status    Former    Types: Cigarettes   Smokeless Tobacco    Never       GI  - Denies GERD  PONV High Risk  Total Score: 3           1 AN PONV: Pt is Female    1 AN PONV: Patient is not a current smoker    1 AN PONV: Intended Post Op Opioids          ENDOCRINE    - BMI: Estimated body mass index is 22.6 kg/m  as calculated from the following:    Height as of this encounter: 1.651 m (5' 5\").    Weight as of this encounter: 61.6 kg (135 lb 12.9 oz).  Healthy Weight (BMI 18.5-24.9)  - No history of " Diabetes Mellitus    HEME  VTE Low Risk 0.26%             Total Score: 0      - No history of abnormal bleeding or antiplatelet use.      MSK  Patient is NOT Frail             Total Score: 0      - Idiopathic scoliosis      The patient is aware that the final anesthesia plan will be decided by the assigned anesthesia provider on the date of service.      The patient is optimized for their procedure. AVS with information on surgery time/arrival time, meds and NPO status given by nursing staff. No further diagnostic testing indicated.      On the day of service:     Prep time: 12 minutes  Visit time: 18 minutes  Documentation time: 10 minutes  ------------------------------------------  Total time: 40 minutes      Jigna Simpson PA-C  Preoperative Assessment Center  [facility]  Phone: [phone]  Fax: [phone]    Physical Exam    Airway        Mallampati: III   TM distance: > 3 FB   Neck ROM: full   Mouth opening: < 3 cm    Respiratory Devices and Support         Dental       (+) Minor Abnormalities - some fillings, tiny chips      Cardiovascular   cardiovascular exam normal          Pulmonary   pulmonary exam normal                Anesthesia Plan    ASA Status:  3    NPO Status:  NPO Appropriate    Anesthesia Type: General.     - Airway: ETT   Induction: Intravenous.   Maintenance: TIVA.   Techniques and Equipment:     - Airway: Video-Laryngoscope     - Lines/Monitors: 2nd IV, Arterial Line, BIS     Consents    Anesthesia Plan(s) and associated risks, benefits, and realistic alternatives discussed. Questions answered and patient/representative(s) expressed understanding.     - Discussed:     - Discussed with:  Patient      - Extended Intubation/Ventilatory Support Discussed: Yes.      - Patient is DNR/DNI Status: No     Use of blood products discussed: Yes.     - Discussed with: Patient.     - Consented: consented to blood products     Postoperative Care    Pain  management: IV analgesics, Oral pain medications.   PONV prophylaxis: Ondansetron (or other 5HT-3), Dexamethasone or Solumedrol     Comments:

## 2024-08-30 NOTE — CONSULTS
[facility]  Consult Note - Hospitalist Service, GOLD TEAM   Date of Admission:  8/30/2024  Consult Requested by: Bishop Toney MARAVILLA  Reason for Consult: Medical Comanagement after Spine Surgery    Assessment & Plan   Khoa Cedeño is a 44 year old female with a history of MDD, PTSD, and thoracolumbar scoliosis admitted s/p T10 to L4 instrumented posterior interbody fusion and colvin metcalf osteotomy on 8/30/24.    #S/p T10-L4 Instrumented Posterior Interbody Fusion and SPO on 8/30/24: By Dr. Hernandez.  mL. Pre-op Hgb 11.9. Last BM 8/29.  - Management per Orthopedics.   *Pain: IV Lidocaine through 0800 on POD#2, scheduled Tylenol, Oxycodone 5-10 m g q 4h PRN, IV Dilaudid 0.2-0.4 mg q 2h PRN. No NSAIDS.   *DVT Prophylaxis: SCDs only   *Antibiotics: IV Cefazolin until drains removed.    *Drains/Tubes/Lines: Drains x 2 to be removed per Ortho. Monet to be removed POD#1.   *Labs: Hgb and Cr on 8/31.   *Activity: Up with assist. No excessive bending or twisting. No lifting >10 lbs x 6 weeks. No Alejandro lift.   *Scheduled and PRN bowel regimen. Antiemetics PRN.    #Post-op Dizziness: Suspect 2/2 side effect from IV Lidocaine. BP stable and normotensive.  - Meclizine 12.5 mg TID PRN  - Encourage PO fluids.   - Consider Scopolamine patch if symptoms persist    #MDD, PTSD  - Continue PTA Lamictal and Trazodone prn    #Acne Vulgaris  - Hold PTA Minocycline, Benzoyl Peroxide, and Retin-A. Resume at discharge.           HOANG Glez  Hospitalist Service, GOLD TEAM   Securely message with [facility] (more info)  Text page via [facility] Paging/Directory   See signed in provider for up to date coverage information  ______________________________________________________________________    Chief Complaint   S/p T10-L4 Instrumented Posterior Interbody Fusion and Colvin Metcalf Osteotomy    History is obtained from the patient and electronic health record    History of Present Illness  "  Khoa Cedeño is a 44 year old female with a history of MDD, PTSD, and adolescent idiopathic thoracolumbar scoliosis who underwent thoracic 10 to lumbar 4 instrumented posterior interbody fusion and colvin metcalf osteotomy on 24 by Dr. Hernandez. She received general anesthesia.  mL. No intra-operative complications noted.     Post-operatively, she reports mild dizziness and blurred vision. Blood pressure has been stable and normotensive. Pain is described as \"ok\" currently. She endorses nausea, but no vomiting. Last BM . Denies chest pain, SOB, and abdominal pain.      Past Medical History    Past Medical History:   Diagnosis Date    Adolescent idiopathic scoliosis of thoracolumbar region     Depression     PTSD (post-traumatic stress disorder)        Past Surgical History   Past Surgical History:   Procedure Laterality Date    ABDOMINOPLASTY       SECTION      x5    SALPINGECTOMY      during        Medications   Medications Prior to Admission   Medication Sig Dispense Refill Last Dose    ibuprofen (ADVIL/MOTRIN) 200 MG tablet Take 200 mg by mouth every 4 hours as needed for pain   Past Month    LAMICTAL 200 MG tablet Take 200 mg by mouth every morning   2024 at 0400    minocycline (MINOCIN) 100 MG capsule Take 100 mg by mouth every morning   2024 at 0400    multivitamin w/minerals (MULTI-VITAMIN) tablet Take 1 tablet by mouth daily   2024    traZODone (DESYREL) 50 MG tablet Take 50 mg by mouth nightly as needed   2024    vitamin C with B complex (B COMPLEX-C) tablet Take 1 tablet by mouth daily   2024    benzoyl peroxide (PANOXYL) 10 % external liquid Apply topically daily       tretinoin (RETIN-A) 0.05 % external cream Apply topically at bedtime             Physical Exam   Vital Signs: Temp: 97.1  F (36.2  C) Temp src: Oral BP: 123/84 Pulse: 91   Resp: 18 SpO2: 100 % O2 Device: Nasal cannula Oxygen Delivery: 2 LPM  Weight: 135 lbs 12.85 oz    General: " Sleeping, but arouses to voice and light touch. NAD.  CV: RRR  HEENT: Mucous membranes dry.  Respiratory: Normal effort on 2L via NC. Lungs CTA anterolaterally.  GI: Abdomen is soft, non-tender, and non-distended. Bowel sounds present.  : Monet in place with toni urine output.  MSK: Back drains x 2 in place with sanguinous output.  Skin: No visible rashes or jaundice.    45 MINUTES SPENT BY ME on the date of service doing chart review, history, exam, documentation & further activities per the note.      Data   Imaging results reviewed over the past 24 hrs:   Recent Results (from the past 24 hour(s))   XR Surgery CAROLINA L/T 5 Min Fluoro    Narrative    This exam was marked as non-reportable because it will not be read by a   radiologist or a [place] non-radiologist provider.           Most Recent CBC:  Lab Test 08/05/24  1351   WBC 6.6   HGB 11.9   MCV 85        Most Recent BMP:  Lab Test 08/05/24  1351      POTASSIUM 3.9   CHLORIDE 106   CO2 25   BUN 7.5   CR 0.71   ANIONGAP 8   MAJO 8.2*   GLC 85

## 2024-08-30 NOTE — BRIEF OP NOTE
Brief Operative Note    Preop Dx:   Adolescent idiopathic scoliosis of thoracolumbar region [M41.125]  Post op Dx:   Same  Procedure:    Procedure(s):  Thoracic 10 to Lumbar 4 Instrumented Posterior Interbody Fusion and Nicole Woodson Osteotomy with O-Arm/Stealth Navigation, use of allograft, local autograft, and bone morphogenic protein  Surgeon:     Hansel Hernandez MD   Assistants:    Justyn Hernández MD   Anesthesia:   General  EBL:    750ml  Total IV Fluids:  See Anesthesia Record  Specimens:   None  Findings:   See Operative Dictation      Assessment and Plan: Khoa Cedeño is a 44 year old female with PMH including PTSD MDD now s/p above procedure on 8/30/2024 with Dr. Hernandez. Procedure went well without complications.     Dr. Hernandez Primary  Activity: Up with assist until independent No excessive bending or twisting. No lifting >10 lbs x 6 weeks. No Alejandro lift for transfers.   Weight bearing status: WBAT.  Pain management:   IV lidocaine: discontinue at 8am on POD#2 or earlier if complications arise.  Transition from IV to PO narcotics as tolerated. No NSAIDs   Antibiotics: Antibtioics until drains are removed  Diet: Begin with clear fluids and progress diet as tolerated.   DVT prophylaxis: SCDs only. No chemical DVT ppx needed.  Imaging: XR Upright Thoracolumbar 2 views PTDC - ordered.  Labs: Hgb POD 1  Bracing/Splinting: None.  Dressings: Keep Bandages clean and dry.   Drains: x2 Document output per shift, will be discontinued at Orthopedic Surgery discretion.  Monet catheter: Remove POD#1.   Physical Therapy/Occupational Therapy: Eval and treat  Cultures: none.    Consults: Hospitalist  Follow-up: Clinic with Dr. Hernandez in 6 weeks with repeat x-rays.   Disposition: Pending progress with therapies, pain control on orals, and medical stability, anticipate discharge to home on POD #3-5.    Indications for assistant:  I assisted in positioning, exposure, retraction, instrumentation, hemostasis, and  wound closure allowing for reduction in anesthesia time and blood loss.     Justyn Hernández MD      Please page me  with any questions/concerns during regular weekday hours before 4pm. If there is no response, if it is a weekend, or if it is during evening hours then please page the orthopaedic surgery resident on call.

## 2024-08-30 NOTE — ANESTHESIA CARE TRANSFER NOTE
Patient: Khoa Cedeño    Procedure: Procedure(s):  Thoracic 10 to Lumbar 4 Instrumented Posterior Interbody Fusion and Nicole Woodson Osteotomy with O-Arm/Stealth Navigation, use of allograft, local autograft, and bone morphogenic protein       Diagnosis: Adolescent idiopathic scoliosis of thoracolumbar region [M41.125]  Diagnosis Additional Information: No value filed.    Anesthesia Type:   General     Note:    Oropharynx: oropharynx clear of all foreign objects and spontaneously breathing  Level of Consciousness: drowsy  Oxygen Supplementation: face mask    Independent Airway: airway patency satisfactory and stable  Dentition: dentition unchanged  Vital Signs Stable: post-procedure vital signs reviewed and stable  Report to RN Given: handoff report given  Patient transferred to: PACU    Handoff Report: Identifed the Patient, Identified the Reponsible Provider, Reviewed the pertinent medical history, Discussed the surgical course, Reviewed Intra-OP anesthesia mangement and issues during anesthesia, Set expectations for post-procedure period and Allowed opportunity for questions and acknowledgement of understanding      Vitals:  Vitals Value Taken Time   BP     Temp     Pulse 92 08/30/24 1304   Resp 20 08/30/24 1304   SpO2 100 % 08/30/24 1304   Vitals shown include unfiled device data.    Electronically Signed By: BIJU Mccurdy CRNA  August 30, 2024  1:05 PM

## 2024-08-30 NOTE — ANESTHESIA PROCEDURE NOTES
Arterial Line Procedure Note    Pre-Procedure   Staff -        Anesthesiologist:  Ambika Saravia MD       Performed By: anesthesiologist       Location: OR       Pre-Anesthestic Checklist: patient identified, IV checked, risks and benefits discussed, informed consent, monitors and equipment checked, pre-op evaluation and at physician/surgeon's request  Timeout:       Correct Patient: Yes        Correct Procedure: Yes        Correct Site: Yes        Correct Position: Yes   Line Placement:   This line was placed Post Induction starting at 8/30/2024 7:50 AM and ending at 8/30/2024 8:05 AM  Procedure   Procedure: arterial line       Diagnosis: intraoperative monitoring       Laterality: left       Insertion Site: radial.  Sterile Prep        Standard elements of sterile barrier followed       Skin prep: Chloraprep  Insertion/Injection        Technique: ultrasound guided        1. Ultrasound was used to evaluate the access site.       2. Artery evaluated via ultrasound for patency/adequacy.       3. Using real-time ultrasound the needle/catheter was observed entering the artery/vein.       Catheter Type/Size: 20 G, 1.75 in/4.5 cm quick cath (integral wire)  Narrative         Secured by: anchor securement device and other       Tegaderm dressing used.       Complications: None apparent,        Arterial waveform: Yes        IBP within 10% of NIBP: Yes

## 2024-08-30 NOTE — PROVIDER NOTIFICATION
Ortho resident Dennis Saldivar paged regarding request for PRN muscle relaxer. Callback number given for bedside RN Madelaine GUNDERSON     Awaiting new orders.

## 2024-08-30 NOTE — ANESTHESIA POSTPROCEDURE EVALUATION
Patient: Khoa Cedeño    Procedure: Procedure(s):  Thoracic 10 to Lumbar 4 Instrumented Posterior Interbody Fusion and Nicole Woodson Osteotomy with O-Arm/Stealth Navigation, use of allograft, local autograft, and bone morphogenic protein       Anesthesia Type:  General    Note:  Disposition: Admission   Postop Pain Control: Uneventful            Sign Out: Well controlled pain   PONV: No   Neuro/Psych: Uneventful            Sign Out: Acceptable/Baseline neuro status   Airway/Respiratory: Uneventful            Sign Out: Acceptable/Baseline resp. status   CV/Hemodynamics: Uneventful            Sign Out: Acceptable CV status; No obvious hypovolemia; No obvious fluid overload   Other NRE: NONE   DID A NON-ROUTINE EVENT OCCUR? No           Last vitals:  Vitals Value Taken Time   /80 08/30/24 1545   Temp 36.1  C (97  F) 08/30/24 1345   Pulse 78 08/30/24 1546   Resp 7 08/30/24 1546   SpO2 100 % 08/30/24 1551   Vitals shown include unfiled device data.    Electronically Signed By: Ambika Walsh MD  August 30, 2024  3:52 PM

## 2024-08-31 ENCOUNTER — APPOINTMENT (OUTPATIENT)
Dept: PHYSICAL THERAPY | Facility: CLINIC | Age: 44
DRG: 458 | End: 2024-08-31
Attending: ORTHOPAEDIC SURGERY
Payer: MEDICAID

## 2024-08-31 ENCOUNTER — APPOINTMENT (OUTPATIENT)
Dept: OCCUPATIONAL THERAPY | Facility: CLINIC | Age: 44
DRG: 458 | End: 2024-08-31
Attending: ORTHOPAEDIC SURGERY
Payer: MEDICAID

## 2024-08-31 LAB
ANION GAP SERPL CALCULATED.3IONS-SCNC: 7 MMOL/L (ref 7–15)
BASOPHILS # BLD AUTO: 0 10E3/UL (ref 0–0.2)
BASOPHILS NFR BLD AUTO: 0 %
BUN SERPL-MCNC: 9.5 MG/DL (ref 6–20)
CA-I BLD-MCNC: 4.4 MG/DL
CALCIUM SERPL-MCNC: 7.3 MG/DL (ref 8.8–10.4)
CHLORIDE SERPL-SCNC: 102 MMOL/L (ref 98–107)
CREAT SERPL-MCNC: 0.63 MG/DL (ref 0.51–0.95)
EGFRCR SERPLBLD CKD-EPI 2021: >90 ML/MIN/1.73M2
EOSINOPHIL # BLD AUTO: 0 10E3/UL (ref 0–0.7)
EOSINOPHIL NFR BLD AUTO: 0 %
ERYTHROCYTE [DISTWIDTH] IN BLOOD BY AUTOMATED COUNT: 13.7 % (ref 10–15)
GLUCOSE BLDC GLUCOMTR-MCNC: 101 MG/DL (ref 70–99)
GLUCOSE BLDC GLUCOMTR-MCNC: 104 MG/DL (ref 70–99)
GLUCOSE SERPL-MCNC: 109 MG/DL (ref 70–99)
HCO3 SERPL-SCNC: 24 MMOL/L (ref 22–29)
HCT VFR BLD AUTO: 29.4 % (ref 35–47)
HGB BLD-MCNC: 9.6 G/DL (ref 11.7–15.7)
IMM GRANULOCYTES # BLD: 0.1 10E3/UL
IMM GRANULOCYTES NFR BLD: 1 %
LYMPHOCYTES # BLD AUTO: 0.9 10E3/UL (ref 0.8–5.3)
LYMPHOCYTES NFR BLD AUTO: 8 %
MCH RBC QN AUTO: 27.6 PG (ref 26.5–33)
MCHC RBC AUTO-ENTMCNC: 32.7 G/DL (ref 31.5–36.5)
MCV RBC AUTO: 85 FL (ref 78–100)
MONOCYTES # BLD AUTO: 0.8 10E3/UL (ref 0–1.3)
MONOCYTES NFR BLD AUTO: 8 %
NEUTROPHILS # BLD AUTO: 8.4 10E3/UL (ref 1.6–8.3)
NEUTROPHILS NFR BLD AUTO: 83 %
NRBC # BLD AUTO: 0 10E3/UL
NRBC BLD AUTO-RTO: 0 /100
PLATELET # BLD AUTO: 175 10E3/UL (ref 150–450)
POTASSIUM SERPL-SCNC: 4.3 MMOL/L (ref 3.4–5.3)
RBC # BLD AUTO: 3.48 10E6/UL (ref 3.8–5.2)
SODIUM SERPL-SCNC: 133 MMOL/L (ref 135–145)
WBC # BLD AUTO: 10.2 10E3/UL (ref 4–11)

## 2024-08-31 PROCEDURE — 36415 COLL VENOUS BLD VENIPUNCTURE: CPT | Performed by: STUDENT IN AN ORGANIZED HEALTH CARE EDUCATION/TRAINING PROGRAM

## 2024-08-31 PROCEDURE — 82330 ASSAY OF CALCIUM: CPT | Performed by: STUDENT IN AN ORGANIZED HEALTH CARE EDUCATION/TRAINING PROGRAM

## 2024-08-31 PROCEDURE — 250N000013 HC RX MED GY IP 250 OP 250 PS 637

## 2024-08-31 PROCEDURE — 97530 THERAPEUTIC ACTIVITIES: CPT | Mod: GO | Performed by: OCCUPATIONAL THERAPIST

## 2024-08-31 PROCEDURE — 250N000011 HC RX IP 250 OP 636: Performed by: PHYSICIAN ASSISTANT

## 2024-08-31 PROCEDURE — 120N000003 HC R&B IMCU UMMC

## 2024-08-31 PROCEDURE — 97535 SELF CARE MNGMENT TRAINING: CPT | Mod: GO | Performed by: OCCUPATIONAL THERAPIST

## 2024-08-31 PROCEDURE — 99233 SBSQ HOSP IP/OBS HIGH 50: CPT | Performed by: STUDENT IN AN ORGANIZED HEALTH CARE EDUCATION/TRAINING PROGRAM

## 2024-08-31 PROCEDURE — 99253 IP/OBS CNSLTJ NEW/EST LOW 45: CPT

## 2024-08-31 PROCEDURE — 97162 PT EVAL MOD COMPLEX 30 MIN: CPT | Mod: GP

## 2024-08-31 PROCEDURE — 250N000013 HC RX MED GY IP 250 OP 250 PS 637: Performed by: PHYSICIAN ASSISTANT

## 2024-08-31 PROCEDURE — 85025 COMPLETE CBC W/AUTO DIFF WBC: CPT | Performed by: STUDENT IN AN ORGANIZED HEALTH CARE EDUCATION/TRAINING PROGRAM

## 2024-08-31 PROCEDURE — 250N000013 HC RX MED GY IP 250 OP 250 PS 637: Performed by: STUDENT IN AN ORGANIZED HEALTH CARE EDUCATION/TRAINING PROGRAM

## 2024-08-31 PROCEDURE — 97530 THERAPEUTIC ACTIVITIES: CPT | Mod: GP

## 2024-08-31 PROCEDURE — 97166 OT EVAL MOD COMPLEX 45 MIN: CPT | Mod: GO | Performed by: OCCUPATIONAL THERAPIST

## 2024-08-31 PROCEDURE — 80048 BASIC METABOLIC PNL TOTAL CA: CPT | Performed by: STUDENT IN AN ORGANIZED HEALTH CARE EDUCATION/TRAINING PROGRAM

## 2024-08-31 RX ORDER — CLINDAMYCIN PHOSPHATE 10 MG/ML
1 SOLUTION TOPICAL 2 TIMES DAILY
COMMUNITY

## 2024-08-31 RX ORDER — MINOCYCLINE HYDROCHLORIDE 100 MG/1
100 CAPSULE ORAL DAILY
Status: DISCONTINUED | OUTPATIENT
Start: 2024-08-31 | End: 2024-09-06 | Stop reason: HOSPADM

## 2024-08-31 RX ORDER — LAMOTRIGINE 200 MG/1
200 TABLET ORAL DAILY
COMMUNITY

## 2024-08-31 RX ADMIN — ONDANSETRON 4 MG: 2 INJECTION INTRAMUSCULAR; INTRAVENOUS at 08:45

## 2024-08-31 RX ADMIN — CEFAZOLIN 1 G: 1 INJECTION, POWDER, FOR SOLUTION INTRAMUSCULAR; INTRAVENOUS at 20:11

## 2024-08-31 RX ADMIN — ACETAMINOPHEN 975 MG: 325 TABLET ORAL at 08:44

## 2024-08-31 RX ADMIN — CEFAZOLIN 1 G: 1 INJECTION, POWDER, FOR SOLUTION INTRAMUSCULAR; INTRAVENOUS at 12:33

## 2024-08-31 RX ADMIN — OXYCODONE HYDROCHLORIDE 10 MG: 5 TABLET ORAL at 10:41

## 2024-08-31 RX ADMIN — ACETAMINOPHEN 975 MG: 325 TABLET ORAL at 17:37

## 2024-08-31 RX ADMIN — CYCLOBENZAPRINE 10 MG: 10 TABLET, FILM COATED ORAL at 20:11

## 2024-08-31 RX ADMIN — OXYCODONE HYDROCHLORIDE 10 MG: 5 TABLET ORAL at 23:58

## 2024-08-31 RX ADMIN — POLYETHYLENE GLYCOL 3350 17 G: 17 POWDER, FOR SOLUTION ORAL at 08:45

## 2024-08-31 RX ADMIN — FAMOTIDINE 20 MG: 10 INJECTION, SOLUTION INTRAVENOUS at 08:45

## 2024-08-31 RX ADMIN — SENNOSIDES AND DOCUSATE SODIUM 1 TABLET: 50; 8.6 TABLET ORAL at 08:44

## 2024-08-31 RX ADMIN — OXYCODONE HYDROCHLORIDE 10 MG: 5 TABLET ORAL at 15:13

## 2024-08-31 RX ADMIN — MINOCYCLINE HYDROCHLORIDE 100 MG: 100 CAPSULE ORAL at 15:47

## 2024-08-31 RX ADMIN — SENNOSIDES AND DOCUSATE SODIUM 1 TABLET: 50; 8.6 TABLET ORAL at 20:12

## 2024-08-31 RX ADMIN — FAMOTIDINE 20 MG: 20 TABLET ORAL at 20:12

## 2024-08-31 RX ADMIN — Medication 1 LOZENGE: at 08:44

## 2024-08-31 RX ADMIN — Medication 25 MCG: at 08:45

## 2024-08-31 RX ADMIN — OXYCODONE HYDROCHLORIDE 10 MG: 5 TABLET ORAL at 20:11

## 2024-08-31 RX ADMIN — CYCLOBENZAPRINE 10 MG: 10 TABLET, FILM COATED ORAL at 08:44

## 2024-08-31 RX ADMIN — OXYCODONE HYDROCHLORIDE 5 MG: 5 TABLET ORAL at 06:17

## 2024-08-31 RX ADMIN — CEFAZOLIN 1 G: 1 INJECTION, POWDER, FOR SOLUTION INTRAMUSCULAR; INTRAVENOUS at 03:30

## 2024-08-31 RX ADMIN — ACETAMINOPHEN 975 MG: 325 TABLET ORAL at 23:58

## 2024-08-31 RX ADMIN — ACETAMINOPHEN 975 MG: 325 TABLET ORAL at 01:22

## 2024-08-31 RX ADMIN — LAMOTRIGINE 200 MG: 200 TABLET ORAL at 08:45

## 2024-08-31 RX ADMIN — MECLIZINE HYDROCHLORIDE 12.5 MG: 12.5 TABLET ORAL at 10:41

## 2024-08-31 RX ADMIN — CYCLOBENZAPRINE 10 MG: 10 TABLET, FILM COATED ORAL at 15:13

## 2024-08-31 ASSESSMENT — ACTIVITIES OF DAILY LIVING (ADL)
ADLS_ACUITY_SCORE: 24
ADLS_ACUITY_SCORE: 23
ADLS_ACUITY_SCORE: 25
ADLS_ACUITY_SCORE: 24
ADLS_ACUITY_SCORE: 24
ADLS_ACUITY_SCORE: 26
ADLS_ACUITY_SCORE: 24
ADLS_ACUITY_SCORE: 25
ADLS_ACUITY_SCORE: 24
ADLS_ACUITY_SCORE: 26
ADLS_ACUITY_SCORE: 23
ADLS_ACUITY_SCORE: 24
ADLS_ACUITY_SCORE: 24
IADL_COMMENTS: FAMILY CAN ASSIST
ADLS_ACUITY_SCORE: 24
ADLS_ACUITY_SCORE: 23
ADLS_ACUITY_SCORE: 24
ADLS_ACUITY_SCORE: 23
ADLS_ACUITY_SCORE: 24
ADLS_ACUITY_SCORE: 23
ADLS_ACUITY_SCORE: 24
ADLS_ACUITY_SCORE: 25
PREVIOUS_RESPONSIBILITIES: MEAL PREP;HOUSEKEEPING;LAUNDRY;SHOPPING;MEDICATION MANAGEMENT;FINANCES;DRIVING;WORK

## 2024-08-31 NOTE — PLAN OF CARE
Problem: Spinal Surgery  Goal: Optimal Coping with Surgery  Outcome: Progressing  Goal: Absence of Bleeding  Outcome: Progressing  Goal: Effective Bowel Elimination  Outcome: Progressing  Goal: Fluid and Electrolyte Balance  Outcome: Progressing  Goal: Optimal Neurologic Function  Outcome: Progressing  Goal: Optimal Pain Control and Function  Outcome: Progressing  Intervention: Prevent or Manage Pain  Recent Flowsheet Documentation  Taken 8/30/2024 1634 by Karol Hernandez RN  Pain Management Interventions: medication (see MAR)  Goal: Nausea and Vomiting Relief  Outcome: Progressing  Goal: Effective Urinary Elimination  Outcome: Progressing  Goal: Effective Oxygenation and Ventilation  Outcome: Progressing     Problem: Fall Injury Risk  Goal: Absence of Fall and Fall-Related Injury  Outcome: Progressing  Intervention: Identify and Manage Contributors  Recent Flowsheet Documentation  Taken 8/30/2024 1858 by Karol Hernandez RN  Medication Review/Management: medications reviewed  Intervention: Promote Injury-Free Environment  Recent Flowsheet Documentation  Taken 8/30/2024 1858 by Karol Hernandez RN  Safety Promotion/Fall Prevention:   room near nurse's station   nonskid shoes/slippers when out of bed   increase visualization of patient   lighting adjusted   clutter free environment maintained   activity supervised     Problem: Infection  Goal: Absence of Infection Signs and Symptoms  Outcome: Progressing     Problem: Gas Exchange Impaired  Goal: Optimal Gas Exchange  Outcome: Progressing   Goal Outcome Evaluation:       Admitted/transferred from: PACU   2 RN full   skin assessment completed by Madelaine Hernandez RN and Lexi COOK.  Skin assessment finding: skin intact, no problems with the exception of surgical incision to the back and old bruising to both shins and upper back discoloration   Interventions/actions: none at this time   continue to monitor. Pt. A&O x4 able to make her needs known. Pt. Was given  IV dilaudid when she arrived pt. Stated pain to be 6/10. Pt. Has two Hemovac in place. Pt. On CAPNO with post op Vitals. Pt. Also on Lidocaine gtt. Pt. Complained about dizziness MD was paged for PRN. On coming nurse notified. PRN Zofran given this shift for Nausea. Pt. Has jacobs in place. Continue POC.

## 2024-08-31 NOTE — PROGRESS NOTES
"Orthopedic Surgery Progress Note:     Thoracic 10 to Lumbar 4 Instrumented Posterior Interbody Fusion and Nicole Woodson Osteotomy with O-Arm/Stealth Navigation, use of allograft, local autograft, and bone morphogenic protein     8/30/24    Subjective:   No acute events overnight. Pain well-controlled. Tolerating a clear diet. Jacobs in place. Not yet moving bowels, +flatus. No new concerns or complaints.    Objective:   /67 (BP Location: Left arm)   Pulse 78   Temp 97.2  F (36.2  C) (Oral)   Resp 18   Ht 1.651 m (5' 5\")   Wt 61.6 kg (135 lb 12.9 oz)   SpO2 100%   BMI 22.60 kg/m    No intake/output data recorded.  General: NAD. Resting comfortably in bed.  Respiratory: Breathing comfortably on RA.  Drain Output:   Deep: 420mL  Superficial: 0mL  Musculoskeletal: Clean and dry bandages      Motor Strength Right Left   Hip flexion: L1, L2, L3 5/5 5/5   Hip adduction: L2, L3 5/5 5/5   Knee flexion: S1 5/5 5/5   Knee extension: L3, L4 5/5 5/5   Ankle dosiflexion: L4, L5 5/5 5/5   EHL: L5 5/5 5/5   Ankle plantarflexion: S1 5/5 5/5     Sensation from L1-S2 is preserved.    Laboratory Data:  Lab Results   Component Value Date    WBC 10.2 08/31/2024    HGB 9.6 (L) 08/31/2024     08/31/2024       Images:  No new images were obtained.Postop images ordered    Assessment & Plan:   Khoa Cedeño is a 44 year old female with PMH including PTSD MDD now s/p above procedure on 8/30/2024 with Dr. Hernandez. Procedure went well without complications.     Upon evaluation today patient found resting comfortably with controlled pain levels and with no new complaints. She has indwelling jacobs catheter in and will be removed today. Encouraged to ambulate through [place].     Will monitor pain levels,  Will monitor wounds and bandages.   Chart drain output.   Pending postoperative X-rays when feasible.      Dr. Hernandez Primary  Activity: Up with assist until independent No excessive bending or twisting. No lifting >10 lbs x 6 " weeks. No Alejandro lift for transfers.   Weight bearing status: WBAT.  Pain management:   IV lidocaine: discontinue at 8am on POD#2 or earlier if complications arise.  Transition from IV to PO narcotics as tolerated. No NSAIDs   Antibiotics: Antibtioics until drains are removed  Diet: Begin with clear fluids and progress diet as tolerated.   DVT prophylaxis: SCDs only. No chemical DVT ppx needed.  Imaging: XR Upright Thoracolumbar 2 views PTDC - ordered.  Labs: Hgb POD 1  Bracing/Splinting: None.  Dressings: Keep Bandages clean and dry.   Drains: x2 Document output per shift, will be discontinued at Orthopedic Surgery discretion.  Monet catheter: Remove POD#1.   Physical Therapy/Occupational Therapy: Eval and treat  Cultures: none.    Consults: Hospitalist  Follow-up: Clinic with Dr. Hernandez in 6 weeks with repeat x-rays.   Disposition: Pending progress with therapies, pain control on orals, and medical stability, anticipate discharge to home on POD #3-5.    Justyn Valladares MD  Spine Fellow     PLEASE PAGE ME directly with any questions/concerns during regular weekday hours before 5 pm. If there is no response, if it is a weekend, or if it is during evening hours then please page the orthopedic surgery resident on call.    FOLLOWUP:    Future Appointments   Date Time Provider Department [place]   8/31/2024  8:15 AM Camille Van, PT [facility] [place]   8/31/2024 10:00 AM Yamile Candelaria, STEFAN [facility] [place]   10/15/2024 11:40 AM Hansel Hernandez MD [facility]

## 2024-08-31 NOTE — PROGRESS NOTES
[facility]    Medicine Progress Note - Hospitalist Service, GOLD TEAM 19    Date of Admission:  8/30/2024    Assessment & Plan    Khoa Cedeño is a 44 year old female with a history of MDD, PTSD, and thoracolumbar scoliosis admitted s/p T10 to L4 instrumented posterior interbody fusion and colvin metcalf osteotomy on 8/30/24.     #S/p T10-L4 Instrumented Posterior Interbody Fusion and SPO on 8/30/24: By Dr. Hernandez.  mL. Pre-op Hgb 11.9. Last BM 8/29.  - Management per Orthopedics.   Activity: Up with assist until independent No excessive bending or twisting. No lifting >10 lbs x 6 weeks. No Alejandro lift for transfers.   Weight bearing status: WBAT.  Pain management:   IV lidocaine: discontinue at 8am on POD#2 or earlier if complications arise.  Transition from IV to PO narcotics as tolerated. No NSAIDs   Antibiotics: Antibtioics until drains are removed  Diet: Begin with clear fluids and progress diet as tolerated.   DVT prophylaxis: SCDs only. No chemical DVT ppx needed.  Imaging: XR Upright Thoracolumbar 2 views PTDC - ordered.  Labs: Hgb POD 1  Bracing/Splinting: None.  Dressings: Keep Bandages clean and dry.   Drains: x2 Document output per shift, will be discontinued at Orthopedic Surgery discretion.  Monet catheter: Remove POD#1.   Physical Therapy/Occupational Therapy: Eval and treat  Cultures: none.    Consults: Hospitalist  Follow-up: Clinic with Dr. Hernandez in 6 weeks with repeat x-rays.   Disposition: Pending progress with therapies, pain control on orals, and medical stability, anticipate discharge to home on POD #3-5.    # Hypocalcemia   -- Ca 7.3   --  Obtain ionized Ca  --  Consider IV Calcium replacement        #Post-op Dizziness: Suspect 2/2 side effect from IV Lidocaine. BP stable and normotensive.  - Meclizine 12.5 mg TID PRN  - Encourage PO fluids.   - Consider Scopolamine patch if symptoms persist     #MDD, PTSD  - Continue PTA Lamictal and  Trazodone prn     #Acne Vulgaris  - Hold PTA Minocycline, Benzoyl Peroxide, and Retin-A. Resume at discharge.          Diet: Advance Diet as Tolerated: Regular Diet Adult    DVT Prophylaxis: Pneumatic Compression Devices  Monet Catheter: PRESENT, indication: Surgical procedure  Lines: None     Cardiac Monitoring: None  Code Status:  Full code    Clinically Significant Risk Factors                                             Disposition Plan     Medically Ready for Discharge: MINERVA Cook MD  Hospitalist Service, GOLD TEAM 19  [facility]  Securely message with Vocera (more info)  Text page via [employer]   See signed in provider for up to date coverage information  ______________________________________________________________________    Interval History   Patient seen and examined this morning. No acute events overnight. Comfortable this morning.     Physical Exam   Vital Signs: Temp: 98.4  F (36.9  C) Temp src: Axillary BP: 107/61 Pulse: 78   Resp: 17 SpO2: 100 % O2 Device: Nasal cannula Oxygen Delivery: 2 LPM  Weight: 135 lbs 12.85 oz    General Appearance: Appears comfortable.  Respiratory: Without wheezes rhonchi or rales.  CTA  Cardiovascular: RRR, without murmurs  GI: Soft, nontender, plus BS  Skin: Without obvious bleeding, bruising or excoriations      Medical Decision Making       59 MINUTES SPENT BY ME on the date of service doing chart review, history, exam, documentation & further activities per the note.      Data   ------------------------- PAST 24 HR DATA REVIEWED -----------------------------------------------

## 2024-08-31 NOTE — PLAN OF CARE
Goal Outcome Evaluation:    1930-0730  Aox4.On capno with 2lpm oxygenation via NC.Hooked to tele-SR.(+) Nausea and 1x emesis, antiemetic given as ordered and minimized environmental stimuli.Surgical incision at the back with dressing, CDI.PIV 2x on R upper and lower arm with Lidocaine drip infusing, No Local Anesthetic Systemic Toxicity noted.Hemovac 2x,patent and intact.(+) Pain 4-7/10, scheduled and PRN pain meds given.Stable VS. Handoff report given to next NOD.

## 2024-08-31 NOTE — PROGRESS NOTES
08/31/24 1517   Appointment Info   Signing Clinician's Name / Credentials (OT) Yamile Candelaria OTR/L   Rehab Comments (OT) spine   Living Environment   People in Home child(laure), dependent   Current Living Arrangements apartment   Home Accessibility wheelchair accessible   Transportation Anticipated health plan transportation;family or friend will provide   Living Environment Comments Patient lives in first floor apartment with 2 sons (14 and 17). Patient notes her mom will be staying with her for a few days once she is home. Patient's mom is brining a shower chair and patient has a tub shower combo.   Self-Care   Usual Activity Tolerance moderate   Current Activity Tolerance fair   Equipment Currently Used at Home none  (will loan a reacher and shower chair from mom)   Fall history within last six months no   Activity/Exercise/Self-Care Comment Pt uses no AD at baseline and IND with all ADLs and IADLs   Instrumental Activities of Daily Living (IADL)   Previous Responsibilities meal prep;housekeeping;laundry;shopping;medication management;finances;driving;work   IADL Comments family can assist   General Information   Onset of Illness/Injury or Date of Surgery 08/30/24   Referring Physician Bishop GEGE Ziegler, TAIWO   Patient/Family Therapy Goal Statement (OT) home   Additional Occupational Profile Info/Pertinent History of Current Problem per chart: Khoa Cedeño is a 44 year old female with a history of MDD, PTSD, and thoracolumbar scoliosis admitted s/p T10 to L4 instrumented posterior interbody fusion and colvin metcalf osteotomy on 8/30/24.   Existing Precautions/Restrictions fall;spinal   Left Upper Extremity (Weight-bearing Status) partial weight-bearing (PWB)  (10#)   Right Upper Extremity (Weight-bearing Status) partial weight-bearing (PWB)  (10#)   Left Lower Extremity (Weight-bearing Status) weight-bearing as tolerated (WBAT)   Right Lower Extremity (Weight-bearing Status) weight-bearing as tolerated (WBAT)    General Observations and Info activity order: up with assist 3x daily   Cognitive Status Examination   Affect/Mental Status (Cognitive) WNL   Follows Commands follows two-step commands;over 90% accuracy   Cognitive Status Comments no cognitive concerns   Pain Assessment   Patient Currently in Pain Yes, see Vital Sign flowsheet   Posture   Posture Comments pt reports leaning to L at baseline, due to pain and dizziness   Range of Motion Comprehensive   Comment, General Range of Motion BUE WFL; stiffness with BLE ROM but WFL   Strength Comprehensive (MMT)   Comment, General Manual Muscle Testing (MMT) Assessment limited by pain and surgical precautions, able to move functionally for bed mobility   Coordination   Upper Extremity Coordination No deficits were identified   Bed Mobility   Bed Mobility supine-sit;sit-supine   Supine-Sit [place] (Bed Mobility) minimum assist (75% patient effort);verbal cues   Sit-Supine [place] (Bed Mobility) minimum assist (75% patient effort);verbal cues   Transfers   Transfers sit-stand transfer;toilet transfer;shower transfer   Sit-Stand Transfer   Sit-Stand [place] (Transfers) contact guard;verbal cues   Assistive Device (Sit-Stand Transfers) walker, front-wheeled   Shower Transfer   [place] Level (Shower Transfer) moderate assist (50% patient effort)   Shower Transfer Comments per clinical judgement; tub/shower   Toilet Transfer   [place] Level (Toilet Transfer) minimum assist (75% patient effort);verbal cues   Balance   Balance Comments good seated; good ambulating in room with 2WW   Activities of Daily Living   BADL Assessment/Intervention bathing;lower body dressing;grooming;toileting   Bathing Assessment/Intervention   [place] Level (Bathing) minimum assist (75% patient effort)   Lower Body Dressing Assessment/Training   [place] Level (Lower Body Dressing) minimum assist (75% patient effort);verbal cues   Grooming Assessment/Training    [place] Level (Grooming) minimum assist (75% patient effort);verbal cues   Toileting   [place] Level (Toileting) minimum assist (75% patient effort);verbal cues   Clinical Impression   Criteria for Skilled Therapeutic Interventions Met (OT) Yes, treatment indicated   OT Diagnosis impaired ADLs   OT Problem List-Impairments impacting ADL problems related to;activity tolerance impaired;balance;range of motion (ROM);strength;pain;post-surgical precautions   Assessment of Occupational Performance 3-5 Performance Deficits   Identified Performance Deficits dressing, toilet transfer, toileting, tub/shower transfer, home chores   Planned Therapy Interventions (OT) ADL retraining;transfer training;home program guidelines   Clinical Decision Making Complexity (OT) detailed assessment/moderate complexity   Risk & Benefits of therapy have been explained evaluation/treatment results reviewed;patient   OT Total Evaluation Time   OT Eval, Moderate Complexity Minutes (82892) 6   OT Goals   Therapy Frequency (OT) Daily   OT Predicted Duration/Target Date for Goal Attainment 09/07/24   OT Goals Hygiene/Grooming;Lower Body Dressing;Transfers;Toilet Transfer/Toileting   OT: Hygiene/Grooming modified independent;within precautions;while standing   OT: Lower Body Dressing Modified independent;using adaptive equipment;within precautions   OT: Transfer Supervision/stand-by assist;with assistive device;within precautions  (tub/shower tx)   OT: Toilet Transfer/Toileting Supervision/stand-by assist;toilet transfer;cleaning and garment management;using adaptive equipment;within precautions   Interventions   Interventions Quick Adds Therapeutic Activity   Self-Care/Home Management   Self-Care/Home Mgmt/ADL, Compensatory, Meal Prep Minutes (81067) 25   Symptoms Noted During/After Treatment (Meal Preparation/Planning Training) fatigue;increased pain   Treatment Detail/Skilled Intervention Ed pt in LE dressing using figure-4 tech, and  pt able to doff/don B slipper socks with 2 attempts due to stiffness.  Ed in pants/underwear, and pt dons underwear CGA using fig-4 tech.  Ed in using reacher as option, or in keeping in BR if pants fall to floor.  Ed in clothing choices to protect incision.  Ed pt in toilet transfer, keeping upright, cued for hand placement, pt completes with CGA.  Ed pt in irina-cares within prec, toilet tongs v bottom nandini.  Pt stands at sink for oral cares, ed in using 2 cups, pt completes SBA.  Ed pt in tub/shower transfer, demo for pt but will trial next session.  Ed in LH brush, using fig-4 tech, shower chair (mom will loan her one), bathmat.  Ed pt in activity level as IP (up in chair for meals, walking 3-4x/day), and at home (up every 1-2hours).   Therapeutic Activities   Therapeutic Activity Minutes (12465) 10   Symptoms noted during/after treatment fatigue;increased pain;dizziness   Treatment Detail/Skilled Intervention Reviewed spinal prec, pt able to state 3/3.  ed pt in applying these to adls.  Reviewed logroll, cues for flexing contralateral LE and pushing with it, cues to reach for bedrail. CGA supine > sit using logroll. Pt c/o mild dizziness t/o session.  BP taken prior to EOB, 130s/60s.  Pt ed in hand placement with STS to 2WW, CGA to complete.  CGA ambulation in room with 2WW.  Cued for return to supine, A for LE.   OT Discharge Planning   OT Plan toileting, tub/shower transfer   OT Discharge Recommendation (DC Rec) home with assist   OT Rationale for DC Rec Pt below baseline, limited by weakness, pain, surgical precautions.  Anticipate pt will progress to home with assist for heavy home chores, once medically stable.   OT Brief overview of current status SBA LE dressing, CGA toilet transfer   Total Session Time   Timed Code Treatment Minutes 35   Total Session Time (sum of timed and untimed services) 41

## 2024-08-31 NOTE — PROGRESS NOTES
"   08/31/24 1326   Appointment Info   Signing Clinician's Name / Credentials (PT) Camille Van DPT   Rehab Comments (PT) spinal precautions; no suzie lift       Present no   Language English   Living Environment   People in Home child(laure), dependent  (17 and 14 year old son)   Current Living Arrangements apartment   Home Accessibility wheelchair accessible   Transportation Anticipated health plan transportation;family or friend will provide  (Medicaid or parents)   Living Environment Comments Patient lives in first floor apartment with 2 sons (14 and 17) and uses no AD at baseline and IND with all ADLs and IADLs. Patient notes her mom will be staying with her for a few days once she is home. Patient's mom is brining a shower chair and patient has a tub shower combo.   Self-Care   Usual Activity Tolerance moderate   Current Activity Tolerance fair   Regular Exercise Yes   Activity/Exercise Type strength training   Exercise Amount/Frequency 3-5 times/wk   Equipment Currently Used at Home none   Fall history within last six months no   Activity/Exercise/Self-Care Comment Patient works with wellness training   General Information   Onset of Illness/Injury or Date of Surgery 08/30/24   Referring Physician Bishop GEGE Ziegler, TAIWO   Patient/Family Therapy Goals Statement (PT) \"Just to get moving and walking again.\"   Pertinent History of Current Problem (include personal factors and/or comorbidities that impact the POC) Khoa Cedeño is a 44 year old female with a history of MDD, PTSD, and thoracolumbar scoliosis admitted s/p T10 to L4 instrumented posterior interbody fusion and colvin metcalf osteotomy on 8/30/24.   Existing Precautions/Restrictions spinal   Weight-Bearing Status - LUE partial weight-bearing (% in comments)  (10#)   Weight-Bearing Status - RUE partial weight-bearing (% in comments)  (10#)   Weight-Bearing Status - LLE weight-bearing as tolerated   Weight-Bearing Status - " RLE weight-bearing as tolerated   Heart Disease Risk Factors Medical history   General Observations Patient supine in bed, very pleasant with moderate pain but willing to particiapte.   Cognition   Affect/Mental Status (Cognition) WNL   Orientation Status (Cognition) oriented x 4   Follows Commands (Cognition) WNL   Pain Assessment   Patient Currently in Pain Yes, see Vital Sign flowsheet   Integumentary/Edema   Integumentary/Edema no deficits were identifed   Posture    Posture Not impaired   Range of Motion (ROM)   Range of Motion ROM deficits secondary to surgical procedure   Strength (Manual Muscle Testing)   Strength (Manual Muscle Testing) Deficits observed during functional mobility   Bed Mobility   Comment, (Bed Mobility) SBA log roll   Transfers   Comment, (Transfers) SBAx1 FWW   Gait/Stairs (Locomotion)   Comment, (Gait/Stairs) SBA/CGAx1 with FWW 20ftx2   Balance   Balance no deficits were identified   Sensory Examination   Sensory Perception WNL   Coordination   Coordination no deficits were identified   Muscle Tone   Muscle Tone no deficits were identified   Clinical Impression   Criteria for Skilled Therapeutic Intervention Yes, treatment indicated   PT Diagnosis (PT) impaired functional mobility   Influenced by the following impairments impaired activity tolerance, impaired LE strength, impaired balance, pain   Functional limitations due to impairments bed mobility, transfers, gait   Clinical Presentation (PT Evaluation Complexity) evolving   Clinical Presentation Rationale per clinical judgement   Clinical Decision Making (Complexity) moderate complexity   Planned Therapy Interventions (PT) balance training;bed mobility training;gait training;home exercise program;neuromuscular re-education;patient/family education;postural re-education;ROM (range of motion);stair training;strengthening;stretching;transfer training;progressive activity/exercise;risk factor education;home program guidelines   Risk &  Benefits of therapy have been explained evaluation/treatment results reviewed;care plan/treatment goals reviewed;risks/benefits reviewed;current/potential barriers reviewed;participants voiced agreement with care plan;participants included;patient   Clinical Impression Comments Patient would benefit from skilled PT services for review and education of spinal precuations and strengthening and functional mobility training in order to d/c home safely.   PT Total Evaluation Time   PT Eval, Moderate Complexity Minutes (13881) 15   Physical Therapy Goals   PT Frequency 2x/day   PT Predicted Duration/Target Date for Goal Attainment 09/07/24   PT Goals Bed Mobility;Transfers;Gait   PT: Bed Mobility Supervision/stand-by assist;Supine to/from sit;Rolling;Within precautions   PT: Transfers Supervision/stand-by assist;Sit to/from stand;Bed to/from chair;Assistive device;Within precautions   PT: Gait Supervision/stand-by assist;Rolling walker;Within precautions;150 feet   Interventions   Interventions Quick Adds Therapeutic Activity   Therapeutic Activity   Therapeutic Activities: dynamic activities to improve functional performance Minutes (23274) 29   Treatment Detail/Skilled Intervention Patient supine in bed upon PT arrival, agreeable to PT. Patient notes decreased pain and nausea, though some dizziness here and there. Patient education on spinal precautions (BLT) and patient completed log roll from supine with HOB 30 degrees to L side with bed rail and verbal cues for proper placement of hands, swinging legs over and then pushing up through her elbow to sit up tall. Once sitting at EOB, les reports small dizziness, BP taken on L arm 120/69 SpO2 100% on RA. Following 2 min seated rest break and patient feeling better, patient completed STS transfer from EOB with FWW and education on hand placement, with patient standing for 1 min and no dizziness. Patient ambualted SBA/CGAx1 with FWW 15ft in room to toilet, completed  toilet transfer with verbal cues for hand placement with grab bars and walker, then toilet transfer STS with FWW. Patient ambulated in room back to EOB, complete STS with FWW SBAx1, then log roll back into bed with verbal cues for technique and Angel for LE. Patient notes fatigue and RPE 8/10 following; increased time needed for line management. Patient left supine in bed with call light and immediate needs within reach.   PT Discharge Planning   PT Plan progress ambualtion, standing LE strength exercises   PT Discharge Recommendation (DC Rec) home with assist;home with home care physical therapy   PT Rationale for DC Rec Patient lives with 2 sons (14 and 18 yo) in apartment on 1st floor and is IND with no AD at baseline; les reports her mother will be staying with her for a few days after the surgery to help her. Patient is currently CGA/Angel for bed mobility and SBA/CGA for transfers and gait with FWW. Pending progress with therapies, anticipate discharge to home with assist using FWW.   PT Brief overview of current status CGA/Angel bed mobility (log roll), SBA/CGA transers and gait with FWW   PT Equipment Needed at Discharge walker, rolling   Total Session Time   Timed Code Treatment Minutes 29   Total Session Time (sum of timed and untimed services) 44

## 2024-09-01 ENCOUNTER — APPOINTMENT (OUTPATIENT)
Dept: OCCUPATIONAL THERAPY | Facility: CLINIC | Age: 44
DRG: 458 | End: 2024-09-01
Attending: ORTHOPAEDIC SURGERY
Payer: MEDICAID

## 2024-09-01 ENCOUNTER — APPOINTMENT (OUTPATIENT)
Dept: PHYSICAL THERAPY | Facility: CLINIC | Age: 44
DRG: 458 | End: 2024-09-01
Attending: ORTHOPAEDIC SURGERY
Payer: MEDICAID

## 2024-09-01 LAB
GLUCOSE BLDC GLUCOMTR-MCNC: 91 MG/DL (ref 70–99)
HGB BLD-MCNC: 9.6 G/DL (ref 11.7–15.7)

## 2024-09-01 PROCEDURE — 97530 THERAPEUTIC ACTIVITIES: CPT | Mod: GO

## 2024-09-01 PROCEDURE — 250N000013 HC RX MED GY IP 250 OP 250 PS 637: Performed by: PHYSICIAN ASSISTANT

## 2024-09-01 PROCEDURE — 250N000013 HC RX MED GY IP 250 OP 250 PS 637: Performed by: STUDENT IN AN ORGANIZED HEALTH CARE EDUCATION/TRAINING PROGRAM

## 2024-09-01 PROCEDURE — 36415 COLL VENOUS BLD VENIPUNCTURE: CPT | Performed by: STUDENT IN AN ORGANIZED HEALTH CARE EDUCATION/TRAINING PROGRAM

## 2024-09-01 PROCEDURE — 99232 SBSQ HOSP IP/OBS MODERATE 35: CPT | Performed by: STUDENT IN AN ORGANIZED HEALTH CARE EDUCATION/TRAINING PROGRAM

## 2024-09-01 PROCEDURE — 97530 THERAPEUTIC ACTIVITIES: CPT | Mod: GP

## 2024-09-01 PROCEDURE — 120N000003 HC R&B IMCU UMMC

## 2024-09-01 PROCEDURE — 258N000003 HC RX IP 258 OP 636

## 2024-09-01 PROCEDURE — 250N000011 HC RX IP 250 OP 636: Performed by: PHYSICIAN ASSISTANT

## 2024-09-01 PROCEDURE — 85018 HEMOGLOBIN: CPT | Performed by: STUDENT IN AN ORGANIZED HEALTH CARE EDUCATION/TRAINING PROGRAM

## 2024-09-01 PROCEDURE — 250N000013 HC RX MED GY IP 250 OP 250 PS 637

## 2024-09-01 RX ORDER — HYDROXYZINE HYDROCHLORIDE 25 MG/1
25 TABLET, FILM COATED ORAL EVERY 6 HOURS PRN
Status: DISCONTINUED | OUTPATIENT
Start: 2024-09-01 | End: 2024-09-06 | Stop reason: HOSPADM

## 2024-09-01 RX ORDER — SODIUM CHLORIDE 9 MG/ML
INJECTION, SOLUTION INTRAVENOUS CONTINUOUS
Status: DISCONTINUED | OUTPATIENT
Start: 2024-09-01 | End: 2024-09-03

## 2024-09-01 RX ORDER — HYDROXYZINE HYDROCHLORIDE 25 MG/1
50 TABLET, FILM COATED ORAL EVERY 6 HOURS PRN
Status: DISCONTINUED | OUTPATIENT
Start: 2024-09-01 | End: 2024-09-06 | Stop reason: HOSPADM

## 2024-09-01 RX ADMIN — ACETAMINOPHEN 975 MG: 325 TABLET ORAL at 17:10

## 2024-09-01 RX ADMIN — CEFAZOLIN 1 G: 1 INJECTION, POWDER, FOR SOLUTION INTRAMUSCULAR; INTRAVENOUS at 04:23

## 2024-09-01 RX ADMIN — HYDROMORPHONE HYDROCHLORIDE 0.4 MG: 1 INJECTION, SOLUTION INTRAMUSCULAR; INTRAVENOUS; SUBCUTANEOUS at 17:10

## 2024-09-01 RX ADMIN — CEFAZOLIN 1 G: 1 INJECTION, POWDER, FOR SOLUTION INTRAMUSCULAR; INTRAVENOUS at 14:29

## 2024-09-01 RX ADMIN — POLYETHYLENE GLYCOL 3350 17 G: 17 POWDER, FOR SOLUTION ORAL at 08:38

## 2024-09-01 RX ADMIN — HYDROXYZINE HYDROCHLORIDE 50 MG: 25 TABLET ORAL at 20:40

## 2024-09-01 RX ADMIN — ACETAMINOPHEN 975 MG: 325 TABLET ORAL at 08:36

## 2024-09-01 RX ADMIN — CYCLOBENZAPRINE 10 MG: 10 TABLET, FILM COATED ORAL at 08:37

## 2024-09-01 RX ADMIN — HYDROMORPHONE HYDROCHLORIDE 0.4 MG: 1 INJECTION, SOLUTION INTRAMUSCULAR; INTRAVENOUS; SUBCUTANEOUS at 14:34

## 2024-09-01 RX ADMIN — SENNOSIDES AND DOCUSATE SODIUM 1 TABLET: 50; 8.6 TABLET ORAL at 08:37

## 2024-09-01 RX ADMIN — CYCLOBENZAPRINE 10 MG: 10 TABLET, FILM COATED ORAL at 20:40

## 2024-09-01 RX ADMIN — HYDROMORPHONE HYDROCHLORIDE 0.4 MG: 1 INJECTION, SOLUTION INTRAMUSCULAR; INTRAVENOUS; SUBCUTANEOUS at 20:40

## 2024-09-01 RX ADMIN — OXYCODONE HYDROCHLORIDE 10 MG: 5 TABLET ORAL at 04:23

## 2024-09-01 RX ADMIN — MINOCYCLINE HYDROCHLORIDE 100 MG: 100 CAPSULE ORAL at 08:37

## 2024-09-01 RX ADMIN — HYDROMORPHONE HYDROCHLORIDE 0.4 MG: 1 INJECTION, SOLUTION INTRAMUSCULAR; INTRAVENOUS; SUBCUTANEOUS at 02:25

## 2024-09-01 RX ADMIN — CEFAZOLIN 1 G: 1 INJECTION, POWDER, FOR SOLUTION INTRAMUSCULAR; INTRAVENOUS at 20:41

## 2024-09-01 RX ADMIN — SODIUM CHLORIDE: 9 INJECTION, SOLUTION INTRAVENOUS at 19:23

## 2024-09-01 RX ADMIN — OXYCODONE HYDROCHLORIDE 10 MG: 5 TABLET ORAL at 22:55

## 2024-09-01 RX ADMIN — Medication 25 MCG: at 08:37

## 2024-09-01 RX ADMIN — SENNOSIDES AND DOCUSATE SODIUM 1 TABLET: 50; 8.6 TABLET ORAL at 20:40

## 2024-09-01 RX ADMIN — LAMOTRIGINE 200 MG: 200 TABLET ORAL at 08:37

## 2024-09-01 RX ADMIN — FAMOTIDINE 20 MG: 20 TABLET ORAL at 20:41

## 2024-09-01 RX ADMIN — FAMOTIDINE 20 MG: 20 TABLET ORAL at 08:37

## 2024-09-01 RX ADMIN — HYDROMORPHONE HYDROCHLORIDE 0.4 MG: 1 INJECTION, SOLUTION INTRAMUSCULAR; INTRAVENOUS; SUBCUTANEOUS at 08:37

## 2024-09-01 RX ADMIN — CYCLOBENZAPRINE 10 MG: 10 TABLET, FILM COATED ORAL at 14:29

## 2024-09-01 RX ADMIN — HYDROXYZINE HYDROCHLORIDE 50 MG: 25 TABLET ORAL at 14:34

## 2024-09-01 ASSESSMENT — ACTIVITIES OF DAILY LIVING (ADL)
ADLS_ACUITY_SCORE: 26
ADLS_ACUITY_SCORE: 29
ADLS_ACUITY_SCORE: 26
ADLS_ACUITY_SCORE: 25
ADLS_ACUITY_SCORE: 29
ADLS_ACUITY_SCORE: 26
ADLS_ACUITY_SCORE: 25
ADLS_ACUITY_SCORE: 26
ADLS_ACUITY_SCORE: 26
ADLS_ACUITY_SCORE: 29
ADLS_ACUITY_SCORE: 26
ADLS_ACUITY_SCORE: 25
ADLS_ACUITY_SCORE: 26
ADLS_ACUITY_SCORE: 26
ADLS_ACUITY_SCORE: 29
ADLS_ACUITY_SCORE: 26
ADLS_ACUITY_SCORE: 25
ADLS_ACUITY_SCORE: 25

## 2024-09-01 NOTE — PROGRESS NOTES
[facility]    Medicine Progress Note - Hospitalist Service, GOLD TEAM 19    Date of Admission:  8/30/2024    Assessment & Plan    Khoa Cedeño is a 44 year old female with a history of MDD, PTSD, and thoracolumbar scoliosis admitted s/p T10 to L4 instrumented posterior interbody fusion and colvin metcalf osteotomy on 8/30/24.    Interval changes  - On going pain  - No acute medical changes   - Continue Therapies    - lightheaded today  - orthostatic vitals to be       #S/p T10-L4 Instrumented Posterior Interbody Fusion and SPO on 8/30/24: By Dr. Hernandez.  mL. Pre-op Hgb 11.9. Last BM 8/29.  - Management per Orthopedics.  Dr. Hernandez Primary  Activity: Up with assist until independent No excessive bending or twisting. No lifting >10 lbs x 6 weeks. No Alejandro lift for transfers.   Weight bearing status: WBAT.  Pain management:   IV lidocaine: discontinue at 8am on POD#2 or earlier if complications arise.  Transition from IV to PO narcotics as tolerated. No NSAIDs   Antibiotics: Antibtioics until drains are removed  Diet: Begin with clear fluids and progress diet as tolerated.   DVT prophylaxis: SCDs only. No chemical DVT ppx needed.  Imaging: XR Upright Thoracolumbar 2 views PTDC - ordered.  Labs: Hgb POD 1  Bracing/Splinting: None.  Dressings: Keep Bandages clean and dry.   Drains: x2 Document output per shift, will be discontinued at Orthopedic Surgery discretion.  Monet catheter: Remove POD#1.   Physical Therapy/Occupational Therapy: Eval and treat  Cultures: none.    Consults: Hospitalist  Follow-up: Clinic with Dr. Hernandez in 6 weeks with repeat x-rays.   Disposition: Pending progress with therapies, pain control on orals, and medical stability, anticipate discharge to home on POD #3-5.    # Hypocalcemia   -- Ca 7.3   --  Obtain ionized Ca--> Normal       #Post-op Dizziness:   - unclear etiology  -Suspect 2/2 side effect from IV Lidocaine. BP stable and  normotensive.  - obtain orthostatic vitals  - Meclizine 12.5 mg TID PRN  - Encourage PO fluids.   - Consider Scopolamine patch if symptoms persist     #MDD, PTSD  - Continue PTA Lamictal and Trazodone prn     #Acne Vulgaris  - Hold PTA Minocycline, Benzoyl Peroxide, and Retin-A. Resume at discharge.          Diet: Advance Diet as Tolerated: Regular Diet Adult    DVT Prophylaxis: Pneumatic Compression Devices  Monet Catheter: PRESENT, indication: Surgical procedure, Acute retention or obstruction  Lines: None     Cardiac Monitoring: None  Code Status:  Full code    Clinically Significant Risk Factors                                             Disposition Plan     Medically Ready for Discharge: TBD             Lorelei Cook MD  Hospitalist Service, GOLD TEAM 19  [facility]  Securely message with [employer] (more info)  Text page via [employer] Paging/Directory   See signed in provider for up to date coverage information  ______________________________________________________________________    Interval History   Patient in pain this morning. Overnight lidocaine drip was discontinued as it was suspected to cause her dizziness     Physical Exam   Vital Signs: Temp: 98.6  F (37  C) Temp src: Oral BP: 133/76 Pulse: 107   Resp: 18 SpO2: 98 % O2 Device: None (Room air)    Weight: 135 lbs 12.85 oz    General Appearance: Appears comfortable.  Respiratory: Without wheezes rhonchi or rales.  CTA  Cardiovascular: RRR, without murmurs  GI: Soft, nontender, plus BS  Skin: Without obvious bleeding, bruising or excoriations      Medical Decision Making       59 MINUTES SPENT BY ME on the date of service doing chart review, history, exam, documentation & further activities per the note.      Data   ------------------------- PAST 24 HR DATA REVIEWED -----------------------------------------------

## 2024-09-01 NOTE — PHARMACY-ADMISSION MEDICATION HISTORY
Pharmacist Admission Medication History    Admission medication history is complete. The information provided in this note is only as accurate as the sources available at the time of the update.    Information Source(s): Patient and CareEverywhere/SureScripts via in-person    Pertinent Information: patient reports no missed doses of lamotrigine     Changes made to PTA medication list:  Added: clindamycin 1% wipes   Deleted: None  Changed: None    Allergies reviewed with patient and updates made in EHR: yes    Medication History Completed By: Pedro Roca RPH 8/31/2024 7:53 PM    PTA Med List   Medication Sig Last Dose    benzoyl peroxide (PANOXYL) 10 % external liquid Apply topically daily Past Week    clindamycin (CLEOCIN T) 1 % SWAB Apply 1 applicator topically 2 times daily. Past Week    ibuprofen (ADVIL/MOTRIN) 200 MG tablet Take 200 mg by mouth every 4 hours as needed for pain Past Month    lamoTRIgine (LAMICTAL) 200 MG tablet Take 200 mg by mouth daily. 8/30/2024 at 0400    minocycline (MINOCIN) 100 MG capsule Take 100 mg by mouth every morning 8/30/2024 at 0400    multivitamin w/minerals (MULTI-VITAMIN) tablet Take 1 tablet by mouth daily 8/16/2024    traZODone (DESYREL) 50 MG tablet Take 50 mg by mouth nightly as needed 8/27/2024    tretinoin (RETIN-A) 0.05 % external cream Apply topically at bedtime Past Week    vitamin C with B complex (B COMPLEX-C) tablet Take 1 tablet by mouth daily 8/16/2024

## 2024-09-01 NOTE — PLAN OF CARE
Problem: Skin Injury Risk Increased  Goal: Skin Health and Integrity  Outcome: Progressing  Intervention: Plan: Nurse Driven Intervention: Moisture Management  Recent Flowsheet Documentation  Taken 8/31/2024 1817 by Karol Hernandez RN  Moisture Interventions: (jacobs in place)   No brief in bed   Other (comment)  Taken 8/31/2024 1430 by Karol Hernandez RN  Moisture Interventions: (jacobs in place)   No brief in bed   Other (comment)  Taken 8/31/2024 1000 by Karol Hernandez RN  Moisture Interventions: (jacobs in place)   No brief in bed   Other (comment)  Intervention: Plan: Nurse Driven Intervention: Friction and Shear  Recent Flowsheet Documentation  Taken 8/31/2024 1817 by Karol Hernandez RN  Friction/Shear Interventions: HOB 30 degrees or less  Taken 8/31/2024 1430 by Karol Hernandez RN  Friction/Shear Interventions: HOB 30 degrees or less  Taken 8/31/2024 1000 by Karol Hernandez RN  Friction/Shear Interventions: HOB 30 degrees or less  Intervention: Optimize Skin Protection  Recent Flowsheet Documentation  Taken 8/31/2024 1817 by Karol Hernandez RN  Activity Management: activity adjusted per tolerance  Head of Bed (HOB) Positioning: HOB at 30-45 degrees  Taken 8/31/2024 1430 by Karol Hernandez RN  Activity Management: activity adjusted per tolerance  Taken 8/31/2024 1000 by Karol Hernandez RN  Activity Management: activity adjusted per tolerance     Problem: Pain Acute  Goal: Optimal Pain Control and Function  Outcome: Progressing  Intervention: Develop Pain Management Plan  Recent Flowsheet Documentation  Taken 8/31/2024 1737 by Karol Hernandez RN  Pain Management Interventions: medication (see MAR)  Taken 8/31/2024 1513 by Karol Hernandez RN  Pain Management Interventions: medication (see MAR)  Taken 8/31/2024 0907 by Karol Hernandez RN  Pain Management Interventions:   rest   relaxation techniques promoted   quiet environment facilitated  Intervention: Prevent or  Manage Pain  Recent Flowsheet Documentation  Taken 8/31/2024 1817 by Karol Hernandez RN  Medication Review/Management: medications reviewed  Taken 8/31/2024 1430 by Karol Hernandez RN  Medication Review/Management: medications reviewed  Taken 8/31/2024 1000 by Karol Hernandez RN  Medication Review/Management: medications reviewed  Intervention: Optimize Psychosocial Wellbeing  Recent Flowsheet Documentation  Taken 8/31/2024 1817 by Karol Hernandez RN  Supportive Measures:   active listening utilized   verbalization of feelings encouraged   self-care encouraged     Problem: Spinal Surgery  Goal: Optimal Coping with Surgery  Outcome: Progressing  Intervention: Support Psychosocial Response to Surgery  Recent Flowsheet Documentation  Taken 8/31/2024 1817 by Karol Hernandez RN  Supportive Measures:   active listening utilized   verbalization of feelings encouraged   self-care encouraged  Goal: Effective Bowel Elimination  Outcome: Progressing  Goal: Fluid and Electrolyte Balance  Outcome: Progressing  Intervention: Monitor and Manage Fluid and Electrolyte Balance  Recent Flowsheet Documentation  Taken 8/31/2024 1817 by Karol Hernandez RN  Fluid/Electrolyte Management: fluids provided  Taken 8/31/2024 1430 by Karol Hernandez RN  Fluid/Electrolyte Management: fluids provided  Taken 8/31/2024 1000 by Karol Hernandez RN  Fluid/Electrolyte Management: fluids provided  Goal: Optimal Functional Ability  Outcome: Progressing  Intervention: Optimize Functional Status  Recent Flowsheet Documentation  Taken 8/31/2024 1817 by Karol Hernandez RN  Activity Management: activity adjusted per tolerance  Taken 8/31/2024 1430 by Karol Hernandez RN  Activity Management: activity adjusted per tolerance  Taken 8/31/2024 1000 by Karol Hernandez RN  Activity Management: activity adjusted per tolerance  Goal: Absence of Infection Signs and Symptoms  Outcome: Progressing  Intervention: Prevent or  Manage Infection  Recent Flowsheet Documentation  Taken 8/31/2024 1817 by Karol Hernandez RN  Infection Prevention:   single patient room provided   rest/sleep promoted  Taken 8/31/2024 1430 by Karol Hernandez RN  Infection Prevention:   single patient room provided   rest/sleep promoted  Taken 8/31/2024 1000 by Karol Hernandez RN  Infection Prevention:   single patient room provided   rest/sleep promoted  Goal: Anesthesia/Sedation Recovery  Outcome: Progressing  Intervention: Optimize Anesthesia Recovery  Recent Flowsheet Documentation  Taken 8/31/2024 1817 by Karol Hernandez RN  Safety Promotion/Fall Prevention:   room near nurse's station   nonskid shoes/slippers when out of bed   increase visualization of patient   lighting adjusted   clutter free environment maintained   activity supervised  Stabilization Measures: legs elevated  Taken 8/31/2024 1430 by Karol Hernandez RN  Safety Promotion/Fall Prevention:   room near nurse's station   nonskid shoes/slippers when out of bed   increase visualization of patient   lighting adjusted   clutter free environment maintained   activity supervised  Stabilization Measures: legs elevated  Taken 8/31/2024 1000 by Karlo Hernandez RN  Safety Promotion/Fall Prevention:   room near nurse's station   nonskid shoes/slippers when out of bed   increase visualization of patient   lighting adjusted   clutter free environment maintained   activity supervised  Stabilization Measures: legs elevated  Goal: Optimal Pain Control and Function  Outcome: Progressing  Intervention: Prevent or Manage Pain  Recent Flowsheet Documentation  Taken 8/31/2024 1737 by Karol Hernandez RN  Pain Management Interventions: medication (see MAR)  Taken 8/31/2024 1513 by Karol Hernandez RN  Pain Management Interventions: medication (see MAR)  Taken 8/31/2024 0907 by Karol Hernandez RN  Pain Management Interventions:   rest   relaxation techniques promoted   quiet  environment facilitated  Goal: Nausea and Vomiting Relief  Outcome: Progressing  Intervention: Prevent or Manage Nausea and Vomiting  Recent Flowsheet Documentation  Taken 8/31/2024 1000 by Kraol Hernandez RN  Nausea/Vomiting Interventions: antiemetic  Goal: Effective Urinary Elimination  Outcome: Progressing  Goal: Effective Oxygenation and Ventilation  Outcome: Progressing  Intervention: Optimize Oxygenation and Ventilation  Recent Flowsheet Documentation  Taken 8/31/2024 1817 by Karol Hernandez RN  Head of Bed (HOB) Positioning: HOB at 30-45 degrees     Problem: Fall Injury Risk  Goal: Absence of Fall and Fall-Related Injury  Outcome: Progressing  Intervention: Identify and Manage Contributors  Recent Flowsheet Documentation  Taken 8/31/2024 1817 by Karol Hernandez RN  Medication Review/Management: medications reviewed  Taken 8/31/2024 1430 by Karol Hernandez RN  Medication Review/Management: medications reviewed  Taken 8/31/2024 1000 by Karol Hernandez RN  Medication Review/Management: medications reviewed  Intervention: Promote Injury-Free Environment  Recent Flowsheet Documentation  Taken 8/31/2024 1817 by Karol Hernandez RN  Safety Promotion/Fall Prevention:   room near nurse's station   nonskid shoes/slippers when out of bed   increase visualization of patient   lighting adjusted   clutter free environment maintained   activity supervised  Taken 8/31/2024 1430 by Karol Hernandez RN  Safety Promotion/Fall Prevention:   room near nurse's station   nonskid shoes/slippers when out of bed   increase visualization of patient   lighting adjusted   clutter free environment maintained   activity supervised  Taken 8/31/2024 1000 by Karol Hernandez RN  Safety Promotion/Fall Prevention:   room near nurse's station   nonskid shoes/slippers when out of bed   increase visualization of patient   lighting adjusted   clutter free environment maintained   activity supervised     Problem:  Infection  Goal: Absence of Infection Signs and Symptoms  Outcome: Progressing     Problem: Gas Exchange Impaired  Goal: Optimal Gas Exchange  Outcome: Progressing  Intervention: Optimize Oxygenation and Ventilation  Recent Flowsheet Documentation  Taken 8/31/2024 1817 by Karol Hernandez RN  Head of Bed (HOB) Positioning: HOB at 30-45 degrees   Goal Outcome Evaluation:  Pt. A&O x 4 able to make needs known. Pt. On Reg diet takes medication whole. Dressing to the back CDI, Hemovac dressing CDI. Monet removed this shift. Pt. Ax1/SBA W walker to bathroom. Pt. Made attempt to use the bathroom, bladder scan after attempt was 440 straight cath was attempted but unsuccessful. On coming nurse notified about situation. Lidocaine paused( Ortho paged)  towards the end of shift due to pt. Complaining about dizziness. PRN medication oxy given during shift for pain twice. PRN Zofran and Meclizine given this shift. Continue POC.

## 2024-09-01 NOTE — PROGRESS NOTES
Brief Medicine Progress Note  August 31, 2024     Paged by RN to let me know that pt's lidocaine drip was turned off as pt was experiencing dizziness. Spoke with pharmacy who recommended keeping the lidocaine off as, if the dizziness is related to the lidocaine, it could be a sign of toxicity.     RN also informed me that the pt was unable to void independently after removing the jacobs. PVR of 650, so jacobs was replaced with 800ml output.     Plan:  - Hold lidocaine gtt  - Jacobs to remain in overnight      James Hunt PA-C  [facility] Hospitalist Service  Securely message with [employer] (more info)  Text page via [facility] Paging/Directory

## 2024-09-01 NOTE — PROGRESS NOTES
"  VS: /66   Pulse 105   Temp 98.2  F (36.8  C) (Oral)   Resp 18   Ht 1.651 m (5' 5\")   Wt 61.6 kg (135 lb 12.9 oz)   SpO2 99%   BMI 22.60 kg/m       O2: RA    Output: Voiding via jacobs    Last BM: 8/29   Activity: Up with walker    Skin: Surgical spine incision    Pain: Pt reports 8/10    CMS: A/O x4    Dressing: CDI    Diet: Regular    LDA: PIVL arm infusing     Plan: Continue with care    Additional Info:        "

## 2024-09-01 NOTE — PROGRESS NOTES
"Orthopedic Surgery Progress Note:     Thoracic 10 to Lumbar 4 Instrumented Posterior Interbody Fusion and Nicole Woodson Osteotomy with O-Arm/Stealth Navigation, use of allograft, local autograft, and bone morphogenic protein      8/30/24    Subjective:   Patient had urine retention after discontinuation of jacobs and re catheterized. Developed suspected Lidocaine toxicity and placed on Holter telemetry. Pain well-controlled. Tolerating a clear diet. Jacobs in place. Not yet moving bowels, +flatus. No new concerns or complaints. .    Objective:   /76 (BP Location: Left arm)   Pulse 107   Temp 98.6  F (37  C) (Oral)   Resp 18   Ht 1.651 m (5' 5\")   Wt 61.6 kg (135 lb 12.9 oz)   SpO2 98%   BMI 22.60 kg/m    I/O this shift:  In: -   Out: 300 [Urine:300]  General: NAD. Resting comfortably in bed.  Respiratory: Breathing comfortably on RA.  Drain Output:   Deep: 140mL  Superficial: 13mL  Musculoskeletal: Clean and dry bandages        Motor Strength Right Left   Hip flexion: L1, L2, L3 5/5 5/5   Hip adduction: L2, L3 5/5 5/5   Knee flexion: S1 5/5 5/5   Knee extension: L3, L4 5/5 5/5   Ankle dosiflexion: L4, L5 5/5 5/5   EHL: L5 5/5 5/5   Ankle plantarflexion: S1 5/5 5/5      Sensation from L1-S2 is preserved.    Laboratory Data:  Lab Results   Component Value Date    WBC 10.2 08/31/2024    HGB 9.6 (L) 08/31/2024     08/31/2024       Images:  No new images were obtained.Postop images ordered     Assessment & Plan:   Khoa Cedeño is a 44 year old female with PMH including PTSD MDD now s/p above procedure on 8/30/2024 with Dr. Hernandez. Procedure went well without complications.      Upon evaluation today patient found resting comfortably with controlled pain levels and with no new complaints. She required re insertion of jacobs catheter with 800ml urine output. She also complained of dizziness and had Lidocaine drip stopped due to possible lidocaine toxicity. Placed on Telemetry as a precaution.      Will " monitor pain levels,  Will monitor wounds and bandages.   Chart drain output.   Pending postoperative X-rays when feasible.   Keep jacobs in for now.      Dr. Hernandez Primary  Activity: Up with assist until independent No excessive bending or twisting. No lifting >10 lbs x 6 weeks. No Alejandro lift for transfers.   Weight bearing status: WBAT.  Pain management:   IV lidocaine: discontinue at 8am on POD#2 or earlier if complications arise.  Transition from IV to PO narcotics as tolerated. No NSAIDs   Antibiotics: Antibtioics until drains are removed  Diet: Begin with clear fluids and progress diet as tolerated.   DVT prophylaxis: SCDs only. No chemical DVT ppx needed.  Imaging: XR Upright Thoracolumbar 2 views PTDC - ordered.  Labs: Hgb POD 1  Bracing/Splinting: None.  Dressings: Keep Bandages clean and dry.   Drains: x2 Document output per shift, will be discontinued at Orthopedic Surgery discretion.  Jacobs catheter: Remove POD#1.   Physical Therapy/Occupational Therapy: Eval and treat  Cultures: none.    Consults: Hospitalist  Follow-up: Clinic with Dr. Hernandez in 6 weeks with repeat x-rays.   Disposition: Pending progress with therapies, pain control on orals, and medical stability, anticipate discharge to home on POD #3-5.    Justyn Valladares MD  Spine Fellow     PLEASE PAGE ME directly with any questions/concerns during regular weekday hours before 5 pm. If there is no response, if it is a weekend, or if it is during evening hours then please page the orthopedic surgery resident on call.    FOLLOWUP:    Future Appointments   Date Time Provider Department Center   9/1/2024  9:15 AM Fang Mcfarland, PT [facility] [place]   9/1/2024  1:30 PM Martha Rodriguez, STEFAN [facility] [place]   9/1/2024  2:00 PM Camille Van, PT [facility] [place]   10/15/2024 11:40 AM Hansel Hernandez MD [facility]

## 2024-09-01 NOTE — PLAN OF CARE
"  Problem: Adult Inpatient Plan of Care  Goal: Plan of Care Review  Description: The Plan of Care Review/Shift note should be completed every shift.  The Outcome Evaluation is a brief statement about your assessment that the patient is improving, declining, or no change.  This information will be displayed automatically on your shift  note.  Outcome: Progressing  Goal: Patient-Specific Goal (Individualized)  Description: You can add care plan individualizations to a care plan. Examples of Individualization might be:  \"Parent requests to be called daily at 9am for status\", \"I have a hard time hearing out of my right ear\", or \"Do not touch me to wake me up as it startles  me\".  Outcome: Progressing  Goal: Absence of Hospital-Acquired Illness or Injury  Outcome: Progressing  Intervention: Identify and Manage Fall Risk  Recent Flowsheet Documentation  Taken 9/1/2024 0001 by Keyanna Briscoe RN  Safety Promotion/Fall Prevention:   room near nurse's station   nonskid shoes/slippers when out of bed   increase visualization of patient   lighting adjusted   clutter free environment maintained   activity supervised  Intervention: Prevent Skin Injury  Recent Flowsheet Documentation  Taken 9/1/2024 0001 by Keyanna Briscoe RN  Body Position: supine, head elevated  Intervention: Prevent and Manage VTE (Venous Thromboembolism) Risk  Recent Flowsheet Documentation  Taken 9/1/2024 0001 by Keyanna Briscoe RN  VTE Prevention/Management: SCDs on (sequential compression devices)  Intervention: Prevent Infection  Recent Flowsheet Documentation  Taken 9/1/2024 0001 by Keyanna Briscoe RN  Infection Prevention:   single patient room provided   rest/sleep promoted  Goal: Optimal Comfort and Wellbeing  Outcome: Progressing  Goal: Readiness for Transition of Care  Outcome: Progressing     Problem: Spinal Surgery  Goal: Effective Oxygenation and Ventilation  Outcome: Progressing  Intervention: Optimize Oxygenation and " Ventilation  Recent Flowsheet Documentation  Taken 9/1/2024 0001 by Keyanna Briscoe RN  Head of Bed (HOB) Positioning: HOB at 30 degrees   Goal Outcome Evaluation    Pain management during the night with some relief stated by patient refer to MAR. Pain rated 9/10 and 10/10 after medication administration. Pain consult recommended. Pain in lower back and lower abdomen. Monet had adequate urinary output with no signs of retention. Constipation , last BM 4 days ago.

## 2024-09-01 NOTE — PLAN OF CARE
Goal Outcome Evaluation:    1930-2330  Aox4.Stable oxygen saturation on room air.Tele-SR.Surgical incision at the back with dressing, CDI.PIV on R arm SL.Hemovac 2x,patent and intact.Previous NOD informed writer that Lidocaine drip was stopped because pt was experiencing dizziness,writer was also informed that pt was unable to void, bladder scan @2000 , placed jacobs cath aseptically as per bladder management protocol,UO of 800ml.Notified ,(Jacobs to remain in overnight as ordered).(+) Pain 5/10, PRN pain meds given.Stable VS. Handoff report given to next NOD.

## 2024-09-02 ENCOUNTER — APPOINTMENT (OUTPATIENT)
Dept: PHYSICAL THERAPY | Facility: CLINIC | Age: 44
DRG: 458 | End: 2024-09-02
Attending: ORTHOPAEDIC SURGERY
Payer: MEDICAID

## 2024-09-02 ENCOUNTER — APPOINTMENT (OUTPATIENT)
Dept: OCCUPATIONAL THERAPY | Facility: CLINIC | Age: 44
DRG: 458 | End: 2024-09-02
Attending: ORTHOPAEDIC SURGERY
Payer: MEDICAID

## 2024-09-02 PROCEDURE — 250N000013 HC RX MED GY IP 250 OP 250 PS 637: Performed by: PHYSICIAN ASSISTANT

## 2024-09-02 PROCEDURE — 93005 ELECTROCARDIOGRAM TRACING: CPT

## 2024-09-02 PROCEDURE — 250N000013 HC RX MED GY IP 250 OP 250 PS 637: Performed by: STUDENT IN AN ORGANIZED HEALTH CARE EDUCATION/TRAINING PROGRAM

## 2024-09-02 PROCEDURE — 97535 SELF CARE MNGMENT TRAINING: CPT | Mod: GO | Performed by: OCCUPATIONAL THERAPIST

## 2024-09-02 PROCEDURE — 97530 THERAPEUTIC ACTIVITIES: CPT | Mod: GP

## 2024-09-02 PROCEDURE — 258N000003 HC RX IP 258 OP 636

## 2024-09-02 PROCEDURE — 97530 THERAPEUTIC ACTIVITIES: CPT | Mod: GO | Performed by: OCCUPATIONAL THERAPIST

## 2024-09-02 PROCEDURE — 93010 ELECTROCARDIOGRAM REPORT: CPT | Performed by: INTERNAL MEDICINE

## 2024-09-02 PROCEDURE — 97116 GAIT TRAINING THERAPY: CPT | Mod: GP

## 2024-09-02 PROCEDURE — 250N000011 HC RX IP 250 OP 636: Performed by: PHYSICIAN ASSISTANT

## 2024-09-02 PROCEDURE — 120N000002 HC R&B MED SURG/OB UMMC

## 2024-09-02 PROCEDURE — 250N000013 HC RX MED GY IP 250 OP 250 PS 637

## 2024-09-02 PROCEDURE — 99232 SBSQ HOSP IP/OBS MODERATE 35: CPT | Performed by: STUDENT IN AN ORGANIZED HEALTH CARE EDUCATION/TRAINING PROGRAM

## 2024-09-02 RX ORDER — TAMSULOSIN HYDROCHLORIDE 0.4 MG/1
0.4 CAPSULE ORAL DAILY
Status: DISCONTINUED | OUTPATIENT
Start: 2024-09-02 | End: 2024-09-06 | Stop reason: HOSPADM

## 2024-09-02 RX ADMIN — Medication 25 MCG: at 07:40

## 2024-09-02 RX ADMIN — ACETAMINOPHEN 650 MG: 325 TABLET ORAL at 22:18

## 2024-09-02 RX ADMIN — CEFAZOLIN 1 G: 1 INJECTION, POWDER, FOR SOLUTION INTRAMUSCULAR; INTRAVENOUS at 13:03

## 2024-09-02 RX ADMIN — SODIUM CHLORIDE: 9 INJECTION, SOLUTION INTRAVENOUS at 07:40

## 2024-09-02 RX ADMIN — MINOCYCLINE HYDROCHLORIDE 100 MG: 100 CAPSULE ORAL at 07:40

## 2024-09-02 RX ADMIN — OXYCODONE HYDROCHLORIDE 5 MG: 5 TABLET ORAL at 10:08

## 2024-09-02 RX ADMIN — CYCLOBENZAPRINE 10 MG: 10 TABLET, FILM COATED ORAL at 14:13

## 2024-09-02 RX ADMIN — OXYCODONE HYDROCHLORIDE 5 MG: 5 TABLET ORAL at 14:26

## 2024-09-02 RX ADMIN — ACETAMINOPHEN 975 MG: 325 TABLET ORAL at 01:06

## 2024-09-02 RX ADMIN — CYCLOBENZAPRINE 10 MG: 10 TABLET, FILM COATED ORAL at 07:40

## 2024-09-02 RX ADMIN — TAMSULOSIN HYDROCHLORIDE 0.4 MG: 0.4 CAPSULE ORAL at 10:08

## 2024-09-02 RX ADMIN — OXYCODONE HYDROCHLORIDE 5 MG: 5 TABLET ORAL at 22:18

## 2024-09-02 RX ADMIN — HYDROXYZINE HYDROCHLORIDE 50 MG: 25 TABLET ORAL at 22:18

## 2024-09-02 RX ADMIN — FAMOTIDINE 20 MG: 20 TABLET ORAL at 07:40

## 2024-09-02 RX ADMIN — HYDROMORPHONE HYDROCHLORIDE 0.4 MG: 1 INJECTION, SOLUTION INTRAMUSCULAR; INTRAVENOUS; SUBCUTANEOUS at 07:03

## 2024-09-02 RX ADMIN — FAMOTIDINE 20 MG: 20 TABLET ORAL at 20:35

## 2024-09-02 RX ADMIN — CYCLOBENZAPRINE 10 MG: 10 TABLET, FILM COATED ORAL at 20:35

## 2024-09-02 RX ADMIN — SENNOSIDES AND DOCUSATE SODIUM 1 TABLET: 50; 8.6 TABLET ORAL at 20:35

## 2024-09-02 RX ADMIN — CEFAZOLIN 1 G: 1 INJECTION, POWDER, FOR SOLUTION INTRAMUSCULAR; INTRAVENOUS at 20:35

## 2024-09-02 RX ADMIN — HYDROMORPHONE HYDROCHLORIDE 0.4 MG: 1 INJECTION, SOLUTION INTRAMUSCULAR; INTRAVENOUS; SUBCUTANEOUS at 01:06

## 2024-09-02 RX ADMIN — POLYETHYLENE GLYCOL 3350 17 G: 17 POWDER, FOR SOLUTION ORAL at 07:41

## 2024-09-02 RX ADMIN — ACETAMINOPHEN 650 MG: 325 TABLET ORAL at 16:31

## 2024-09-02 RX ADMIN — HYDROXYZINE HYDROCHLORIDE 50 MG: 25 TABLET ORAL at 04:50

## 2024-09-02 RX ADMIN — CEFAZOLIN 1 G: 1 INJECTION, POWDER, FOR SOLUTION INTRAMUSCULAR; INTRAVENOUS at 04:50

## 2024-09-02 RX ADMIN — OXYCODONE HYDROCHLORIDE 10 MG: 5 TABLET ORAL at 04:50

## 2024-09-02 RX ADMIN — OXYCODONE HYDROCHLORIDE 10 MG: 5 TABLET ORAL at 18:18

## 2024-09-02 RX ADMIN — HYDROMORPHONE HYDROCHLORIDE 0.4 MG: 1 INJECTION, SOLUTION INTRAMUSCULAR; INTRAVENOUS; SUBCUTANEOUS at 16:31

## 2024-09-02 RX ADMIN — LAMOTRIGINE 200 MG: 200 TABLET ORAL at 07:40

## 2024-09-02 RX ADMIN — SENNOSIDES AND DOCUSATE SODIUM 1 TABLET: 50; 8.6 TABLET ORAL at 07:40

## 2024-09-02 RX ADMIN — ACETAMINOPHEN 650 MG: 325 TABLET ORAL at 07:40

## 2024-09-02 ASSESSMENT — ACTIVITIES OF DAILY LIVING (ADL)
ADLS_ACUITY_SCORE: 29
ADLS_ACUITY_SCORE: 29
ADLS_ACUITY_SCORE: 30
ADLS_ACUITY_SCORE: 29
ADLS_ACUITY_SCORE: 30
ADLS_ACUITY_SCORE: 29
ADLS_ACUITY_SCORE: 30
ADLS_ACUITY_SCORE: 30
ADLS_ACUITY_SCORE: 29
ADLS_ACUITY_SCORE: 30
ADLS_ACUITY_SCORE: 29
ADLS_ACUITY_SCORE: 30

## 2024-09-02 NOTE — PROGRESS NOTES
Brief Medicine Progress Note  September 1, 2024     Paged earlier today regarding IV fluids for pt given she is still dizzy and there was question of if this was related to hypovolemia and orthostasis. RN was told provider would order fluids, but the order hadn't come through yet, so she wanted to see if I could order them.     Started patient on 125 ml/hr NS. RN also mentioned pt using 0.4mg IV dilaudid somewhat consistently and wondering if this could contribute to her dizziness. Pt reporting 8/10 or 9/10 pain despite appearing comfortable. RN reports rather flat affect so difficulty to read pt's pain level.     Touched base with evening nurse who stated pt was resting, so uncertain about dizziness. However, pt still utilizing iv dilaudid and reporting 9/10 or 10/10 pain after the medication. RN states patient seems very comfortable.     Wonder if some of pt's symptoms are in setting of iv opioids and if dose reduction would be helpful. Since there are no reports of concerning levels of sedation, slow respiratory rate, abnormal vitals, will leave pain meds as is for the evening. If dizziness persists despite continued fluid resuscitation overnight, perhaps decreasing opioid use could be a reasonable next step. IVF decreased to 100 ml/hr overnight.       James Hunt PA-C  [facility] Hospitalist Service  Securely message with [employer] (more info)  Text page via [employer] Paging/Directory

## 2024-09-02 NOTE — PROGRESS NOTES
[facility]    Medicine Progress Note - Hospitalist Service, GOLD TEAM 19    Date of Admission:  8/30/2024    Assessment & Plan    Khoa Cedeño is a 44 year old female with a history of MDD, PTSD, and thoracolumbar scoliosis admitted s/p T10 to L4 instrumented posterior interbody fusion and colvin metcalf osteotomy on 8/30/24.    Interval changes  - Continue IVF  - Continue Pain control   - Plans to remove deep drain per primary   - No acute medical changes     #S/p T10-L4 Instrumented Posterior Interbody Fusion and SPO on 8/30/24: By Dr. Hernandez.  mL. Pre-op Hgb 11.9. Last BM 8/29.  - Management per Orthopedics.  Dr. Hernandez Primary  Activity: Up with assist until independent No excessive bending or twisting. No lifting >10 lbs x 6 weeks. No Alejandro lift for transfers.   Weight bearing status: WBAT.  Pain management:   IV lidocaine: discontinue at 8am on POD#2 or earlier if complications arise.  Transition from IV to PO narcotics as tolerated. No NSAIDs   Antibiotics: Antibtioics until drains are removed  Diet: Begin with clear fluids and progress diet as tolerated.   DVT prophylaxis: SCDs only. No chemical DVT ppx needed.  Imaging: XR Upright Thoracolumbar 2 views PTDC - ordered.  Labs: Hgb POD 1  Bracing/Splinting: None.  Dressings: Keep Bandages clean and dry.   Drains: x2 Document output per shift, will be discontinued at Orthopedic Surgery discretion.  Monet catheter: Remove POD#1.   Physical Therapy/Occupational Therapy: Eval and treat  Cultures: none.    Consults: Hospitalist  Follow-up: Clinic with Dr. Hernandez in 6 weeks with repeat x-rays.   Disposition: Pending progress with therapies, pain control on orals, and medical stability, anticipate discharge to home on POD #3-5.    # Hypocalcemia   -- Ca 7.3   --  Obtain ionized Ca--> Normal       #Post-op Dizziness:   - unclear etiology  -Suspect 2/2 side effect from IV Lidocaine. BP stable and normotensive.  -  obtain orthostatic vitals  - Meclizine 12.5 mg TID PRN  - Encourage PO fluids.   - Consider Scopolamine patch if symptoms persist     #MDD, PTSD  - Continue PTA Lamictal and Trazodone prn     #Acne Vulgaris  - Hold PTA Minocycline, Benzoyl Peroxide, and Retin-A. Resume at discharge.          Diet: Advance Diet as Tolerated: Regular Diet Adult    DVT Prophylaxis: Pneumatic Compression Devices  Monet Catheter: PRESENT, indication: Surgical procedure, Acute retention or obstruction  Lines: None     Cardiac Monitoring: None  Code Status:  Full code    Clinically Significant Risk Factors                                             Disposition Plan     Medically Ready for Discharge: TBD             Lorelei Cook MD  Hospitalist Service, GOLD TEAM 19  [facility]  Securely message with [employer] (more info)  Text page via [email or web address] Paging/Directory   See signed in provider for up to date coverage information  ______________________________________________________________________    Interval History   Patient seen and examined at bedside in no acute distress. States significant pain overnight.   Pain slightly better this morning. Denies nausea, vomiting, or abd pain.    Physical Exam   Vital Signs: Temp: 98.4  F (36.9  C) Temp src: Oral BP: 135/81 Pulse: 101   Resp: 16 SpO2: 100 % O2 Device: None (Room air)    Weight: 135 lbs 12.85 oz    General Appearance: Appears comfortable.  Respiratory: Without wheezes rhonchi or rales.  CTA  Cardiovascular: RRR, without murmurs  GI: Soft, nontender, plus BS  Skin: Without obvious bleeding, bruising or excoriations      Medical Decision Making       59 MINUTES SPENT BY ME on the date of service doing chart review, history, exam, documentation & further activities per the note.      Data   ------------------------- PAST 24 HR DATA REVIEWED -----------------------------------------------

## 2024-09-02 NOTE — PLAN OF CARE
Problem: Pain Acute  Goal: Optimal Pain Control and Function  9/2/2024 0414 by Keyanna Briscoe RN  Outcome: Unable to Meet  9/1/2024 2332 by Keyanna Briscoe RN  Outcome: Progressing  Intervention: Prevent or Manage Pain  Recent Flowsheet Documentation  Taken 9/2/2024 0032 by Keyanna Briscoe RN  Bowel Elimination Promotion: adequate fluid intake promoted  Medication Review/Management: medications reviewed  Taken 9/1/2024 2050 by Keyanna Briscoe RN  Bowel Elimination Promotion: adequate fluid intake promoted  Medication Review/Management: medications reviewed  Intervention: Optimize Psychosocial Wellbeing  Recent Flowsheet Documentation  Taken 9/2/2024 0032 by Keyanna Briscoe RN  Supportive Measures:   active listening utilized   verbalization of feelings encouraged   self-care encouraged  Taken 9/1/2024 2050 by Keyanna Briscoe RN  Supportive Measures:   active listening utilized   verbalization of feelings encouraged   self-care encouraged   Goal Outcome Evaluation:    Pain management during the night with some relief stated by patient refer to MAR. Pain rated 9/10 and 10/10 after medication administration. Pain consult recommended. Pain in lower back and lower abdomen. Monet had adequate urinary output with no signs of retention. Constipation , last BM 5 days ago. Dizziness reported upon standing and ambulating. SBA recommended with tranfers.over all no change in status.

## 2024-09-02 NOTE — PROGRESS NOTES
"Pain Service Progress Note  [facility]  Date: 09/01/2024       Patient Name: Khoa Cedeño  MRN: [id number]  Age: 44 year old  Sex: female      Assessment:  Patient is a 43 y/o lady s/p spine surgery.  She is POD#2.  States that she had a difficult night last night, but her pain is much better today    Procedure: Thoracic 10 to Lumbar 4 Instrumented Posterior Interbody Fusion and Nicole Woodson Osteotomy with O-Arm/Stealth Navigation, use of allograft, local autograft, and bone morphogenic protein     Date of Surgery: 08/30/2024    Date of Catheter Placement: N/A    Plan/Recommendations:  Continue Oxycodone 5-10 mg q4h prn    Pain Service will continue to follow.      Haris Tapia MD  09/01/2024     Overnight Events: Patient had pain overnight    Tubes/Drains: Yes      Subjective:  I feel better today.  Last night was difficult   Nausea: No  Vomiting: No  Pruritus: No  Symptoms of LAST: No    Pain Location:  Thoracolumbar Spine    Pain Intensity:    Pain at Rest: 5/10   Pain with Activity: 5/10  Comfort Goal: 0/10   Baseline Pain: 0/10   Satisfied with your level of pain control: Yes    Diet: Advance Diet as Tolerated: Regular Diet Adult    Relevant Labs:  Recent Labs   Lab Test 08/31/24  0620      BUN 9.5       Physical Exam:  Vitals: /68 (BP Location: Left arm)   Pulse 101   Temp 98.1  F (36.7  C) (Oral)   Resp 18   Ht 1.651 m (5' 5\")   Wt 61.6 kg (135 lb 12.9 oz)   SpO2 100%   BMI 22.60 kg/m      Physical Exam:   Orientation:  Alert, oriented, and in no acute distress: Yes  Sedation: No    Motor Examination:  5/5 Strength in lower extremities: Yes    Sensory Level:   Decrease in sensation: No    Relevant Medications:  Current Pain Medications:  Medications related to Pain Management (From now, onward)      Start     Dose/Rate Route Frequency Ordered Stop    09/02/24 0000  acetaminophen (TYLENOL) tablet 650 mg         650 mg Oral EVERY 4 HOURS PRN 08/30/24 " "1436      09/01/24 0835  hydrOXYzine HCl (ATARAX) tablet 25 mg        Placed in \"Or\" Linked Group    25 mg Oral EVERY 6 HOURS PRN 09/01/24 0835      09/01/24 0835  hydrOXYzine HCl (ATARAX) tablet 50 mg        Placed in \"Or\" Linked Group    50 mg Oral EVERY 6 HOURS PRN 09/01/24 0835      08/31/24 0800  polyethylene glycol (MIRALAX) Packet 17 g         17 g Oral DAILY 08/30/24 1614      08/31/24 0800  lamoTRIgine (LaMICtal) tablet 200 mg         200 mg Oral EVERY MORNING 08/30/24 1724      08/30/24 2000  senna-docusate (SENOKOT-S/PERICOLACE) 8.6-50 MG per tablet 1 tablet         1 tablet Oral 2 TIMES DAILY 08/30/24 1614      08/30/24 2000  cyclobenzaprine (FLEXERIL) tablet 10 mg         10 mg Oral 3 TIMES DAILY 08/30/24 1816      08/30/24 1700  acetaminophen (TYLENOL) tablet 975 mg         975 mg Oral EVERY 8 HOURS 08/30/24 1436 09/02/24 1659    08/30/24 1436  lidocaine 1 % 0.1-1 mL         0.1-1 mL Other EVERY 1 HOUR PRN 08/30/24 1436      08/30/24 1436  lidocaine (LMX4) cream          Topical EVERY 1 HOUR PRN 08/30/24 1436      08/30/24 1436  magnesium hydroxide (MILK OF MAGNESIA) suspension 30 mL         30 mL Oral DAILY PRN 08/30/24 1436      08/30/24 1436  bisacodyl (DULCOLAX) suppository 10 mg         10 mg Rectal DAILY PRN 08/30/24 1436      08/30/24 1436  HYDROmorphone (PF) (DILAUDID) injection 0.2 mg        Placed in \"Or\" Linked Group    0.2 mg Intravenous EVERY 2 HOURS PRN 08/30/24 1436      08/30/24 1436  HYDROmorphone (PF) (DILAUDID) injection 0.4 mg        Placed in \"Or\" Linked Group    0.4 mg Intravenous EVERY 2 HOURS PRN 08/30/24 1436      08/30/24 1436  oxyCODONE (ROXICODONE) tablet 5 mg        Placed in \"Or\" Linked Group    5 mg Oral EVERY 4 HOURS PRN 08/30/24 1436      08/30/24 1436  oxyCODONE (ROXICODONE) tablet 10 mg        Placed in \"Or\" Linked Group    10 mg Oral EVERY 4 HOURS PRN 08/30/24 1436              Primary Service Contacted with Recommendations? No      30 MINUTES SPENT BY ME on the date " "of service doing chart review, history, exam, documentation & further activities per the note.      Acute Inpatient Pain Service [facility]  Hours of pain coverage 24/7   Page via [employer]- Please Page the Pain Team Via [facility]: \"PAIN MANAGEMENT ACUTE INPATIENT/ [facility]\"             "

## 2024-09-02 NOTE — PROGRESS NOTES
"Orthopedic Surgery Progress Note:     Thoracic 10 to Lumbar 4 Instrumented Posterior Interbody Fusion and Nicole Woodson Osteotomy with O-Arm/Stealth Navigation, use of allograft, local autograft, and bone morphogenic protein      8/30/24    Subjective:   Patient had a rough night pain wise, hoping today will be better.  Tolerating a clear diet. Monet in place. Not yet moving bowels, +flatus. No new concerns or complaints. Having dizziness upon standing.     Objective:   /81 (BP Location: Left arm)   Pulse 101   Temp 98.4  F (36.9  C) (Oral)   Resp 16   Ht 1.651 m (5' 5\")   Wt 61.6 kg (135 lb 12.9 oz)   SpO2 100%   BMI 22.60 kg/m    I/O this shift:  In: -   Out: 560 [Urine:500; Drains:60]  General: NAD. Resting comfortably in bed.  Respiratory: Breathing comfortably on RA.  Drain Output:   Deep: 40mL  Superficial: 20mL  Musculoskeletal: Clean and dry bandages        Motor Strength Right Left   Hip flexion: L1, L2, L3 5/5 5/5   Hip adduction: L2, L3 5/5 5/5   Knee flexion: S1 5/5 5/5   Knee extension: L3, L4 5/5 5/5   Ankle dosiflexion: L4, L5 5/5 5/5   EHL: L5 5/5 5/5   Ankle plantarflexion: S1 5/5 5/5      Sensation from L1-S2 is preserved.    Laboratory Data:  Lab Results   Component Value Date    WBC 10.2 08/31/2024    HGB 9.6 (L) 09/01/2024     08/31/2024       Images:  No new images were obtained.Postop images ordered     Assessment & Plan:   Khoa Cedeño is a 44 year old female with PMH including PTSD MDD now s/p above procedure on 8/30/2024 with Dr. Hernandez. Procedure went well without complications.      Upon evaluation today patient found resting comfortably. She refers that her pain level on her back was not good last night. She is currently experiencing dizziness upon standing thought to be related to pain medications. Pain service is trying to balance her side effects with pain control.      Will monitor pain levels,  Will monitor wounds and bandages.   Will remove deep drain today. "   Pending postoperative X-rays when feasible.   TOV later today     Dr. Hernandez Primary  Activity: Up with assist until independent No excessive bending or twisting. No lifting >10 lbs x 6 weeks. No Alejandro lift for transfers.   Weight bearing status: WBAT.  Pain management:   IV lidocaine: discontinue at 8am on POD#2 or earlier if complications arise.  Transition from IV to PO narcotics as tolerated. No NSAIDs   Antibiotics: Antibtioics until drains are removed  Diet: Begin with clear fluids and progress diet as tolerated.   DVT prophylaxis: SCDs only. No chemical DVT ppx needed.  Imaging: XR Upright Thoracolumbar 2 views PTDC - ordered.  Labs: Hgb POD 1  Bracing/Splinting: None.  Dressings: Keep Bandages clean and dry.   Drains: x2 Document output per shift, will be discontinued at Orthopedic Surgery discretion.  Monet catheter: Remove POD#1.   Physical Therapy/Occupational Therapy: Eval and treat  Cultures: none.    Consults: Hospitalist  Follow-up: Clinic with Dr. Hernandez in 6 weeks with repeat x-rays.   Disposition: Pending progress with therapies, pain control on orals, and medical stability, anticipate discharge to home on POD #3-5.    Justyn Valladares MD  Spine Fellow     PLEASE PAGE ME directly with any questions/concerns during regular weekday hours before 5 pm. If there is no response, if it is a weekend, or if it is during evening hours then please page the orthopedic surgery resident on call.    FOLLOWUP:    Future Appointments   Date Time Provider Department Center   9/2/2024  8:30 PM Argelia Hoyos, [facility] [place]   9/3/2024  8:45 AM Jennifer Cooney PT [facility] [place]   9/3/2024  1:30 PM Jennifer Cooney, PT [facility] [place]   10/15/2024 11:40 AM Hansel Hernandez MD [facility]

## 2024-09-02 NOTE — PROGRESS NOTES
"Pain Service Progress Note  [facility]  Date: 09/02/2024       Patient Name: Khoa Cedeño  MRN: [id number]  Age: 44 year old  Sex: female      Assessment:  Patient is a 43 y/o lady s/p spine surgery.  She is POD#3.  She states that her pain was difficult to treat yesterday.  She received oxycodone at 0400 and 2200 yesterday.  She received some intravenous hydromorphone    Procedure: Thoracic 10 to Lumbar 4 Instrumented Posterior Interbody Fusion and Nicole Woodson Osteotomy with O-Arm/Stealth Navigation, use of allograft, local autograft, and bone morphogenic protein     Date of Surgery: 08/30/2024    Date of Catheter Placement: N/A    Plan/Recommendations:  I have encouraged her to ask for the oxycodone to treat her pain more adequately. Continue to use the oxycodone 5-10 mg q4h prn.  If her pain is not well controlled, can increase the dose to 10-15 mg q4h prn.    Pain Service will continue to follow.      Haris Tapia MD  09/02/2024     Overnight Events: Patient had pain overnight    Tubes/Drains: Yes      Subjective:  I feel better today.  Last night was difficult   Nausea: No  Vomiting: No  Pruritus: No  Symptoms of LAST: No    Pain Location:  Thoracolumbar Spine    Pain Intensity:    Pain at Rest: 5/10   Pain with Activity: 5/10  Comfort Goal: 0/10   Baseline Pain: 0/10   Satisfied with your level of pain control: Yes    Diet: Advance Diet as Tolerated: Regular Diet Adult    Relevant Labs:  Recent Labs   Lab Test 08/31/24  0620      BUN 9.5       Physical Exam:  Vitals: /69 (BP Location: Left arm, Patient Position: Semi-Almeida's, Cuff Size: Adult Regular)   Pulse 101   Temp 97.9  F (36.6  C) (Oral)   Resp 16   Ht 1.651 m (5' 5\")   Wt 61.6 kg (135 lb 12.9 oz)   SpO2 100%   BMI 22.60 kg/m      Physical Exam:   Orientation:  Alert, oriented, and in no acute distress: Yes  Sedation: No    Motor Examination:  5/5 Strength in lower extremities: Yes    Sensory " "Level:   Decrease in sensation: No    Relevant Medications:  Current Pain Medications:  Medications related to Pain Management (From now, onward)      Start     Dose/Rate Route Frequency Ordered Stop    09/02/24 0000  acetaminophen (TYLENOL) tablet 650 mg         650 mg Oral EVERY 4 HOURS PRN 08/30/24 1436      09/01/24 0835  hydrOXYzine HCl (ATARAX) tablet 25 mg        Placed in \"Or\" Linked Group    25 mg Oral EVERY 6 HOURS PRN 09/01/24 0835      09/01/24 0835  hydrOXYzine HCl (ATARAX) tablet 50 mg        Placed in \"Or\" Linked Group    50 mg Oral EVERY 6 HOURS PRN 09/01/24 0835      08/31/24 0800  polyethylene glycol (MIRALAX) Packet 17 g         17 g Oral DAILY 08/30/24 1614      08/31/24 0800  lamoTRIgine (LaMICtal) tablet 200 mg         200 mg Oral EVERY MORNING 08/30/24 1724      08/30/24 2000  senna-docusate (SENOKOT-S/PERICOLACE) 8.6-50 MG per tablet 1 tablet         1 tablet Oral 2 TIMES DAILY 08/30/24 1614      08/30/24 2000  cyclobenzaprine (FLEXERIL) tablet 10 mg         10 mg Oral 3 TIMES DAILY 08/30/24 1816      08/30/24 1700  acetaminophen (TYLENOL) tablet 975 mg         975 mg Oral EVERY 8 HOURS 08/30/24 1436 09/02/24 1659    08/30/24 1436  lidocaine 1 % 0.1-1 mL         0.1-1 mL Other EVERY 1 HOUR PRN 08/30/24 1436      08/30/24 1436  lidocaine (LMX4) cream          Topical EVERY 1 HOUR PRN 08/30/24 1436      08/30/24 1436  magnesium hydroxide (MILK OF MAGNESIA) suspension 30 mL         30 mL Oral DAILY PRN 08/30/24 1436      08/30/24 1436  bisacodyl (DULCOLAX) suppository 10 mg         10 mg Rectal DAILY PRN 08/30/24 1436      08/30/24 1436  HYDROmorphone (PF) (DILAUDID) injection 0.2 mg        Placed in \"Or\" Linked Group    0.2 mg Intravenous EVERY 2 HOURS PRN 08/30/24 1436      08/30/24 1436  HYDROmorphone (PF) (DILAUDID) injection 0.4 mg        Placed in \"Or\" Linked Group    0.4 mg Intravenous EVERY 2 HOURS PRN 08/30/24 1436      08/30/24 1436  oxyCODONE (ROXICODONE) tablet 5 mg        Placed in " "\"Or\" Linked Group    5 mg Oral EVERY 4 HOURS PRN 08/30/24 1436      08/30/24 1436  oxyCODONE (ROXICODONE) tablet 10 mg        Placed in \"Or\" Linked Group    10 mg Oral EVERY 4 HOURS PRN 08/30/24 1436              Primary Service Contacted with Recommendations? No      30 MINUTES SPENT BY ME on the date of service doing chart review, history, exam, documentation & further activities per the note.      Acute Inpatient Pain Service [facility]  Hours of pain coverage 24/7   Page via [employer]- Please Page the Pain Team Via [employer]: \"PAIN MANAGEMENT ACUTE INPATIENT/ [facility]\"             "

## 2024-09-02 NOTE — PLAN OF CARE
End of shift Summary: See flowsheet for VS and detail assessments.     Changes this Shift:      Pulmonary: LS clear throughout all lobes, denies shortness of breath, no cough present. Remains on RA.     Output: Continent of bowel, jacobs in place.     Activity: Up SBA w/ walker     Skin: Dried/cracked lips, given lip balm.     Pain: C/o mild-severe pain ranging d/t different activities. See MAR for details.     Neuro/CMS: A&Ox4, CMS intact, denies n/t.     Dressings/Drains:  Dressing changed by ortho today and C/D/I. Drainage into drain(s) serosanguinous      IV: RPIV infusing NS @ 100mL/hr    Additional info:     Plan: Continue POC        Plan of Care Reviewed With: patient    Overall Patient Progress: improvingOverall Patient Progress: improving

## 2024-09-03 ENCOUNTER — APPOINTMENT (OUTPATIENT)
Dept: PHYSICAL THERAPY | Facility: CLINIC | Age: 44
DRG: 458 | End: 2024-09-03
Attending: ORTHOPAEDIC SURGERY
Payer: MEDICAID

## 2024-09-03 ENCOUNTER — APPOINTMENT (OUTPATIENT)
Dept: GENERAL RADIOLOGY | Facility: CLINIC | Age: 44
DRG: 458 | End: 2024-09-03
Attending: NURSE PRACTITIONER
Payer: MEDICAID

## 2024-09-03 LAB
ATRIAL RATE - MUSE: 95 BPM
DIASTOLIC BLOOD PRESSURE - MUSE: NORMAL MMHG
INTERPRETATION ECG - MUSE: NORMAL
P AXIS - MUSE: 32 DEGREES
PR INTERVAL - MUSE: 128 MS
QRS DURATION - MUSE: 80 MS
QT - MUSE: 342 MS
QTC - MUSE: 429 MS
R AXIS - MUSE: 27 DEGREES
SYSTOLIC BLOOD PRESSURE - MUSE: NORMAL MMHG
T AXIS - MUSE: 27 DEGREES
VENTRICULAR RATE- MUSE: 95 BPM

## 2024-09-03 PROCEDURE — 250N000013 HC RX MED GY IP 250 OP 250 PS 637: Performed by: NURSE PRACTITIONER

## 2024-09-03 PROCEDURE — 258N000003 HC RX IP 258 OP 636

## 2024-09-03 PROCEDURE — 97530 THERAPEUTIC ACTIVITIES: CPT | Mod: GP | Performed by: PHYSICAL THERAPIST

## 2024-09-03 PROCEDURE — 250N000013 HC RX MED GY IP 250 OP 250 PS 637: Performed by: STUDENT IN AN ORGANIZED HEALTH CARE EDUCATION/TRAINING PROGRAM

## 2024-09-03 PROCEDURE — 250N000013 HC RX MED GY IP 250 OP 250 PS 637: Performed by: INTERNAL MEDICINE

## 2024-09-03 PROCEDURE — 250N000011 HC RX IP 250 OP 636: Performed by: PHYSICIAN ASSISTANT

## 2024-09-03 PROCEDURE — 120N000002 HC R&B MED SURG/OB UMMC

## 2024-09-03 PROCEDURE — 250N000013 HC RX MED GY IP 250 OP 250 PS 637: Performed by: PHYSICIAN ASSISTANT

## 2024-09-03 PROCEDURE — 250N000013 HC RX MED GY IP 250 OP 250 PS 637

## 2024-09-03 PROCEDURE — 97116 GAIT TRAINING THERAPY: CPT | Mod: GP | Performed by: PHYSICAL THERAPIST

## 2024-09-03 PROCEDURE — 99232 SBSQ HOSP IP/OBS MODERATE 35: CPT | Mod: VID | Performed by: ANESTHESIOLOGY

## 2024-09-03 PROCEDURE — 99232 SBSQ HOSP IP/OBS MODERATE 35: CPT | Performed by: INTERNAL MEDICINE

## 2024-09-03 PROCEDURE — 999N000065 XR THORACIC LUMBAR STANDING 2 VIEWS

## 2024-09-03 RX ORDER — POLYETHYLENE GLYCOL 3350 17 G/17G
17 POWDER, FOR SOLUTION ORAL 2 TIMES DAILY
Status: DISCONTINUED | OUTPATIENT
Start: 2024-09-03 | End: 2024-09-06 | Stop reason: HOSPADM

## 2024-09-03 RX ORDER — AMOXICILLIN 250 MG
2 CAPSULE ORAL 2 TIMES DAILY
Status: DISCONTINUED | OUTPATIENT
Start: 2024-09-03 | End: 2024-09-06 | Stop reason: HOSPADM

## 2024-09-03 RX ORDER — BISACODYL 10 MG
10 SUPPOSITORY, RECTAL RECTAL ONCE
Status: COMPLETED | OUTPATIENT
Start: 2024-09-03 | End: 2024-09-03

## 2024-09-03 RX ORDER — ACETAMINOPHEN 325 MG/1
650 TABLET ORAL EVERY 4 HOURS
Status: DISCONTINUED | OUTPATIENT
Start: 2024-09-03 | End: 2024-09-06 | Stop reason: HOSPADM

## 2024-09-03 RX ADMIN — CEFAZOLIN 1 G: 1 INJECTION, POWDER, FOR SOLUTION INTRAMUSCULAR; INTRAVENOUS at 19:43

## 2024-09-03 RX ADMIN — POLYETHYLENE GLYCOL 3350 17 G: 17 POWDER, FOR SOLUTION ORAL at 09:16

## 2024-09-03 RX ADMIN — CEFAZOLIN 1 G: 1 INJECTION, POWDER, FOR SOLUTION INTRAMUSCULAR; INTRAVENOUS at 11:44

## 2024-09-03 RX ADMIN — TRAZODONE HYDROCHLORIDE 50 MG: 50 TABLET ORAL at 02:11

## 2024-09-03 RX ADMIN — ACETAMINOPHEN 650 MG: 325 TABLET ORAL at 11:44

## 2024-09-03 RX ADMIN — FAMOTIDINE 20 MG: 20 TABLET ORAL at 19:44

## 2024-09-03 RX ADMIN — HYDROXYZINE HYDROCHLORIDE 25 MG: 25 TABLET, FILM COATED ORAL at 04:25

## 2024-09-03 RX ADMIN — OXYCODONE HYDROCHLORIDE 10 MG: 5 TABLET ORAL at 16:10

## 2024-09-03 RX ADMIN — ACETAMINOPHEN 650 MG: 325 TABLET ORAL at 19:44

## 2024-09-03 RX ADMIN — OXYCODONE HYDROCHLORIDE 10 MG: 5 TABLET ORAL at 11:43

## 2024-09-03 RX ADMIN — HYDROXYZINE HYDROCHLORIDE 50 MG: 25 TABLET ORAL at 19:44

## 2024-09-03 RX ADMIN — Medication 25 MCG: at 09:16

## 2024-09-03 RX ADMIN — BISACODYL 10 MG: 10 SUPPOSITORY RECTAL at 16:45

## 2024-09-03 RX ADMIN — SODIUM CHLORIDE: 9 INJECTION, SOLUTION INTRAVENOUS at 04:30

## 2024-09-03 RX ADMIN — FAMOTIDINE 20 MG: 20 TABLET ORAL at 09:16

## 2024-09-03 RX ADMIN — TAMSULOSIN HYDROCHLORIDE 0.4 MG: 0.4 CAPSULE ORAL at 09:16

## 2024-09-03 RX ADMIN — SENNOSIDES AND DOCUSATE SODIUM 2 TABLET: 50; 8.6 TABLET ORAL at 19:44

## 2024-09-03 RX ADMIN — HYDROMORPHONE HYDROCHLORIDE 0.4 MG: 1 INJECTION, SOLUTION INTRAMUSCULAR; INTRAVENOUS; SUBCUTANEOUS at 12:42

## 2024-09-03 RX ADMIN — CYCLOBENZAPRINE 10 MG: 10 TABLET, FILM COATED ORAL at 14:44

## 2024-09-03 RX ADMIN — SENNOSIDES AND DOCUSATE SODIUM 1 TABLET: 50; 8.6 TABLET ORAL at 09:16

## 2024-09-03 RX ADMIN — OXYCODONE HYDROCHLORIDE 10 MG: 5 TABLET ORAL at 20:28

## 2024-09-03 RX ADMIN — POLYETHYLENE GLYCOL 3350 17 G: 17 POWDER, FOR SOLUTION ORAL at 19:44

## 2024-09-03 RX ADMIN — ACETAMINOPHEN 650 MG: 325 TABLET ORAL at 06:56

## 2024-09-03 RX ADMIN — CYCLOBENZAPRINE 10 MG: 10 TABLET, FILM COATED ORAL at 19:44

## 2024-09-03 RX ADMIN — ACETAMINOPHEN 650 MG: 325 TABLET ORAL at 02:10

## 2024-09-03 RX ADMIN — CYCLOBENZAPRINE 10 MG: 10 TABLET, FILM COATED ORAL at 09:16

## 2024-09-03 RX ADMIN — OXYCODONE HYDROCHLORIDE 10 MG: 5 TABLET ORAL at 02:11

## 2024-09-03 RX ADMIN — OXYCODONE HYDROCHLORIDE 10 MG: 5 TABLET ORAL at 06:56

## 2024-09-03 RX ADMIN — LAMOTRIGINE 200 MG: 200 TABLET ORAL at 09:16

## 2024-09-03 RX ADMIN — ACETAMINOPHEN 650 MG: 325 TABLET ORAL at 14:44

## 2024-09-03 RX ADMIN — HYDROMORPHONE HYDROCHLORIDE 0.4 MG: 1 INJECTION, SOLUTION INTRAMUSCULAR; INTRAVENOUS; SUBCUTANEOUS at 21:40

## 2024-09-03 RX ADMIN — MINOCYCLINE HYDROCHLORIDE 100 MG: 100 CAPSULE ORAL at 09:16

## 2024-09-03 RX ADMIN — CEFAZOLIN 1 G: 1 INJECTION, POWDER, FOR SOLUTION INTRAMUSCULAR; INTRAVENOUS at 04:25

## 2024-09-03 ASSESSMENT — ACTIVITIES OF DAILY LIVING (ADL)
ADLS_ACUITY_SCORE: 30
ADLS_ACUITY_SCORE: 29
ADLS_ACUITY_SCORE: 29
ADLS_ACUITY_SCORE: 30
ADLS_ACUITY_SCORE: 29
ADLS_ACUITY_SCORE: 30
ADLS_ACUITY_SCORE: 29
ADLS_ACUITY_SCORE: 30
ADLS_ACUITY_SCORE: 30
ADLS_ACUITY_SCORE: 29
ADLS_ACUITY_SCORE: 30

## 2024-09-03 NOTE — PROGRESS NOTES
[facility]    Medicine Progress Note - Hospitalist Service, GOLD TEAM 19    Date of Admission:  8/30/2024    Assessment & Plan    Khoa Cedeño is a 44 year old female with a history of MDD, PTSD, and thoracolumbar scoliosis admitted s/p T10 to L4 instrumented posterior interbody fusion and colvin metcalf osteotomy on 8/30/24.    Interval changes: 9/3/2024  -Patient awake alert oriented x 3.  Complains of abdominal pain and not having bowel movement since Thursday last week.  -No vomiting.  Passing gas.  --She was pain moving inside her room.  -She would like to try suppository today.    #S/p T10-L4 Instrumented Posterior Interbody Fusion and SPO on 8/30/24: By Dr. Hernandez.  mL. Pre-op Hgb 11.9. Last BM 8/29.  - Management per Orthopedics.  Dr. Hernandez Primary  Activity: Up with assist until independent No excessive bending or twisting. No lifting >10 lbs x 6 weeks. No Alejandro lift for transfers.   Weight bearing status: WBAT.  Pain management:   IV lidocaine: discontinue at 8am on POD#2 or earlier if complications arise.  Transition from IV to PO narcotics as tolerated. No NSAIDs   Antibiotics: Antibtioics until drains are removed  Diet: Begin with clear fluids and progress diet as tolerated.   DVT prophylaxis: SCDs only. No chemical DVT ppx needed.  Imaging: XR Upright Thoracolumbar 2 views PTDC - ordered.  Labs: Hgb POD 1  Bracing/Splinting: None.  Dressings: Keep Bandages clean and dry.   Drains: x2 Document output per shift, will be discontinued at Orthopedic Surgery discretion.  Monet catheter: Remove POD#1.   Physical Therapy/Occupational Therapy: Eval and treat  Cultures: none.    Consults: Hospitalist  Follow-up: Clinic with Dr. Hernandez in 6 weeks with repeat x-rays.   Disposition: Pending progress with therapies, pain control on orals, and medical stability, anticipate discharge to home on POD #3-5.    # Hypocalcemia   -- Ca 7.3   --  Obtain ionized Ca-->  Normal       #Post-op Dizziness:   - unclear etiology  -Suspect 2/2 side effect from IV Lidocaine. BP stable and normotensive.  - obtain orthostatic vitals  - Meclizine 12.5 mg TID PRN  - Encourage PO fluids.   - Consider Scopolamine patch if symptoms persist     #MDD, PTSD  - Continue PTA Lamictal and Trazodone prn     #Acne Vulgaris  - Hold PTA Minocycline, Benzoyl Peroxide, and Retin-A. Resume at discharge.          Diet: Advance Diet as Tolerated: Regular Diet Adult    DVT Prophylaxis: Pneumatic Compression Devices  Monet Catheter: PRESENT, indication: Surgical procedure, Acute retention or obstruction  Lines: None     Cardiac Monitoring: None  Code Status:  Full code    Clinically Significant Risk Factors                                             Disposition Plan     Medically Ready for Discharge: TBD             Kerwin Hernandez MD  Hospitalist Service, GOLD TEAM 19  [facility]  Securely message with [employer] (more info)  Text page via [employer] Paging/Directory   See signed in provider for up to date coverage information  ______________________________________________________________________    Interval History   Patient seen and examined at bedside in no acute distress. States significant pain overnight.   Pain persist.  No BM since Thursday last week.. Denies nausea, vomiting, or abd pain.    Physical Exam   Vital Signs: Temp: 98.1  F (36.7  C) Temp src: Oral BP: 118/75 Pulse: 105   Resp: 18 SpO2: 100 % O2 Device: None (Room air)    Weight: 135 lbs 12.85 oz    General Appearance: Appears comfortable.  Respiratory: Without wheezes rhonchi or rales.  CTA  Cardiovascular: RRR, without murmurs  GI: Soft, nontender, plus BS  Skin: Without obvious bleeding, bruising or excoriations      Medical Decision Making       59 MINUTES SPENT BY ME on the date of service doing chart review, history, exam, documentation & further activities per the note.      Data   CBC RESULTS:    Recent Labs   Lab Test 09/01/24  1608 08/31/24  0620   WBC  --  10.2   RBC  --  3.48*   HGB 9.6* 9.6*   HCT  --  29.4*   MCV  --  85   MCH  --  27.6   MCHC  --  32.7   RDW  --  13.7   PLT  --  175     Last Comprehensive Metabolic Panel:  Lab Results   Component Value Date     (L) 08/31/2024    POTASSIUM 4.3 08/31/2024    CHLORIDE 102 08/31/2024    CO2 24 08/31/2024    ANIONGAP 7 08/31/2024    GLC 91 09/01/2024    BUN 9.5 08/31/2024    CR 0.63 08/31/2024    GFRESTIMATED >90 08/31/2024    MAJO 7.3 (L) 08/31/2024

## 2024-09-03 NOTE — PROGRESS NOTES
"Pain Service Progress Note  [facility]  Telemedicine follow up  Date: 09/03/2024       Patient Name: Khoa Cedeño  MRN: [id number]  Age: 44 year old  Sex: female      Assessment:  44 year old female with PMH of PTSD and MDD s/p complex spine surgery on 8/30/2024      Plan/Recommendations:  1. Schedule APAP  2. Continue cyclobenzaprine  3. Continue oxycodone 5-10 mg Q4H/PRN. If unable to wean from IV analgesics can consider increasing frequency of oxycodone to Q3H/PRN.  4. Would limit IV hydromorphone for PT/OT  5. Bowel regimen.    Pain Service will sign off.    Discussed with attending anesthesiologist    Truong Collins MD  09/03/2024     Overnight Events: None.    Subjective:  I feel better.   Nausea: No  Vomiting: No  Pruritus: No    Pain Location:  Back Pain    Pain Intensity:    Pain at Rest: 4/10   Pain with Activity: 4/10  Comfort Goal: 3/10   Baseline Pain: 3/10   Satisfied with your level of pain control: Yes    Diet: Advance Diet as Tolerated: Regular Diet Adult    Relevant Labs:  Recent Labs   Lab Test 08/31/24  0620      BUN 9.5       Physical Exam:  Vitals: /75 (BP Location: Left arm)   Pulse 105   Temp 98.1  F (36.7  C) (Oral)   Resp 18   Ht 1.651 m (5' 5\")   Wt 61.6 kg (135 lb 12.9 oz)   SpO2 100%   BMI 22.60 kg/m      Physical Exam:   Orientation:  Alert, oriented, and in no acute distress: Yes  Sedation: No    Relevant Medications:  Current Pain Medications:  Medications related to Pain Management (From now, onward)      Start     Dose/Rate Route Frequency Ordered Stop    09/02/24 0000  acetaminophen (TYLENOL) tablet 650 mg         650 mg Oral EVERY 4 HOURS PRN 08/30/24 1436      09/01/24 0835  hydrOXYzine HCl (ATARAX) tablet 25 mg        Placed in \"Or\" Linked Group    25 mg Oral EVERY 6 HOURS PRN 09/01/24 0835      09/01/24 0835  hydrOXYzine HCl (ATARAX) tablet 50 mg        Placed in \"Or\" Linked Group    50 mg Oral EVERY 6 HOURS PRN 09/01/24 0835   " "   08/31/24 0800  polyethylene glycol (MIRALAX) Packet 17 g         17 g Oral DAILY 08/30/24 1614      08/31/24 0800  lamoTRIgine (LaMICtal) tablet 200 mg         200 mg Oral EVERY MORNING 08/30/24 1724      08/30/24 2000  senna-docusate (SENOKOT-S/PERICOLACE) 8.6-50 MG per tablet 1 tablet         1 tablet Oral 2 TIMES DAILY 08/30/24 1614 08/30/24 2000  cyclobenzaprine (FLEXERIL) tablet 10 mg         10 mg Oral 3 TIMES DAILY 08/30/24 1816      08/30/24 1436  lidocaine 1 % 0.1-1 mL         0.1-1 mL Other EVERY 1 HOUR PRN 08/30/24 1436      08/30/24 1436  lidocaine (LMX4) cream          Topical EVERY 1 HOUR PRN 08/30/24 1436      08/30/24 1436  magnesium hydroxide (MILK OF MAGNESIA) suspension 30 mL         30 mL Oral DAILY PRN 08/30/24 1436      08/30/24 1436  bisacodyl (DULCOLAX) suppository 10 mg         10 mg Rectal DAILY PRN 08/30/24 1436      08/30/24 1436  HYDROmorphone (PF) (DILAUDID) injection 0.2 mg        Placed in \"Or\" Linked Group    0.2 mg Intravenous EVERY 2 HOURS PRN 08/30/24 1436      08/30/24 1436  HYDROmorphone (PF) (DILAUDID) injection 0.4 mg        Placed in \"Or\" Linked Group    0.4 mg Intravenous EVERY 2 HOURS PRN 08/30/24 1436      08/30/24 1436  oxyCODONE (ROXICODONE) tablet 5 mg        Placed in \"Or\" Linked Group    5 mg Oral EVERY 4 HOURS PRN 08/30/24 1436      08/30/24 1436  oxyCODONE (ROXICODONE) tablet 10 mg        Placed in \"Or\" Linked Group    10 mg Oral EVERY 4 HOURS PRN 08/30/24 1436              Primary Service Contacted with Recommendations? Yes      Please see A&P for additional details of medical decision making.      Tele-Visit Details    Type of service:  Video Visit    Video Start Time (time video started): 9/3/24 10:43 AM    Video End Time (time video stopped): 9/3/24 10:49 AM    Originating Location (pt. Location): Patient Hospital Room     Distant Location (provider location): [facility]    Reason for Televisit: Pain Consult     Mode of Communication:  Video Conference " "via [email or web address]    Physician has received verbal consent for a video visit from the patient? Yes      Truong Collins MD       Acute Inpatient Pain Service [facility]  Hours of pain coverage 24/7   Page via Am[employer]- Please Page the Pain Team Via [facility]: \"PAIN MANAGEMENT ACUTE INPATIENT/ [facility]\"      "

## 2024-09-03 NOTE — PROGRESS NOTES
23:00PM to 07:00AM    Received alert and oriented x4, in room air.  Complained of pain, given Oxycodone an Tylenol  Denies N/V, numbness/tingling sensation, SOB and chest discomfort.  Surgical wound dressing is dry and intact, w/ hemovac.  Due IV antibiotic given. With ongoing IV NS at 100ml/hr  Monet catheter in place, draining well.  Instructed to use call light for assistance

## 2024-09-04 ENCOUNTER — APPOINTMENT (OUTPATIENT)
Dept: PHYSICAL THERAPY | Facility: CLINIC | Age: 44
DRG: 458 | End: 2024-09-04
Attending: ORTHOPAEDIC SURGERY
Payer: MEDICAID

## 2024-09-04 ENCOUNTER — APPOINTMENT (OUTPATIENT)
Dept: OCCUPATIONAL THERAPY | Facility: CLINIC | Age: 44
DRG: 458 | End: 2024-09-04
Attending: ORTHOPAEDIC SURGERY
Payer: MEDICAID

## 2024-09-04 LAB
ALBUMIN UR-MCNC: NEGATIVE MG/DL
APPEARANCE UR: CLEAR
BILIRUB UR QL STRIP: NEGATIVE
COLOR UR AUTO: ABNORMAL
GLUCOSE UR STRIP-MCNC: NEGATIVE MG/DL
HGB UR QL STRIP: NEGATIVE
HYALINE CASTS: 1 /LPF
KETONES UR STRIP-MCNC: ABNORMAL MG/DL
LEUKOCYTE ESTERASE UR QL STRIP: NEGATIVE
MUCOUS THREADS #/AREA URNS LPF: PRESENT /LPF
NITRATE UR QL: NEGATIVE
PH UR STRIP: 7 [PH] (ref 5–7)
RBC URINE: 0 /HPF
SP GR UR STRIP: 1.02 (ref 1–1.03)
SQUAMOUS EPITHELIAL: 3 /HPF
UROBILINOGEN UR STRIP-MCNC: NORMAL MG/DL
WBC URINE: 1 /HPF

## 2024-09-04 PROCEDURE — 81001 URINALYSIS AUTO W/SCOPE: CPT | Performed by: NURSE PRACTITIONER

## 2024-09-04 PROCEDURE — 250N000013 HC RX MED GY IP 250 OP 250 PS 637: Performed by: STUDENT IN AN ORGANIZED HEALTH CARE EDUCATION/TRAINING PROGRAM

## 2024-09-04 PROCEDURE — 120N000002 HC R&B MED SURG/OB UMMC

## 2024-09-04 PROCEDURE — 250N000011 HC RX IP 250 OP 636: Performed by: PHYSICIAN ASSISTANT

## 2024-09-04 PROCEDURE — 250N000013 HC RX MED GY IP 250 OP 250 PS 637: Performed by: PHYSICIAN ASSISTANT

## 2024-09-04 PROCEDURE — 97530 THERAPEUTIC ACTIVITIES: CPT | Mod: GP

## 2024-09-04 PROCEDURE — 97535 SELF CARE MNGMENT TRAINING: CPT | Mod: GO

## 2024-09-04 PROCEDURE — 97530 THERAPEUTIC ACTIVITIES: CPT | Mod: GO

## 2024-09-04 PROCEDURE — 250N000013 HC RX MED GY IP 250 OP 250 PS 637: Performed by: INTERNAL MEDICINE

## 2024-09-04 PROCEDURE — 250N000013 HC RX MED GY IP 250 OP 250 PS 637: Performed by: NURSE PRACTITIONER

## 2024-09-04 PROCEDURE — 250N000013 HC RX MED GY IP 250 OP 250 PS 637

## 2024-09-04 PROCEDURE — 99232 SBSQ HOSP IP/OBS MODERATE 35: CPT | Performed by: INTERNAL MEDICINE

## 2024-09-04 RX ORDER — OXYCODONE HYDROCHLORIDE 5 MG/1
5 TABLET ORAL
Status: DISCONTINUED | OUTPATIENT
Start: 2024-09-04 | End: 2024-09-05

## 2024-09-04 RX ORDER — VITAMIN B COMPLEX
25 TABLET ORAL DAILY
COMMUNITY
Start: 2024-09-05

## 2024-09-04 RX ORDER — POLYETHYLENE GLYCOL 3350 17 G/17G
17 POWDER, FOR SOLUTION ORAL 2 TIMES DAILY
Qty: 510 G | Refills: 0 | Status: SHIPPED | OUTPATIENT
Start: 2024-09-04

## 2024-09-04 RX ORDER — OXYCODONE HYDROCHLORIDE 10 MG/1
10 TABLET ORAL
Status: DISCONTINUED | OUTPATIENT
Start: 2024-09-04 | End: 2024-09-05

## 2024-09-04 RX ORDER — AMOXICILLIN 250 MG
2 CAPSULE ORAL 2 TIMES DAILY
COMMUNITY
Start: 2024-09-04

## 2024-09-04 RX ORDER — ACETAMINOPHEN 325 MG/1
650 TABLET ORAL EVERY 4 HOURS
COMMUNITY
Start: 2024-09-04

## 2024-09-04 RX ORDER — HYDROXYZINE HYDROCHLORIDE 25 MG/1
25 TABLET, FILM COATED ORAL EVERY 6 HOURS PRN
Qty: 50 TABLET | Refills: 0 | Status: SHIPPED | OUTPATIENT
Start: 2024-09-04

## 2024-09-04 RX ORDER — CYCLOBENZAPRINE HCL 10 MG
10 TABLET ORAL 3 TIMES DAILY
Qty: 25 TABLET | Refills: 0 | Status: SHIPPED | OUTPATIENT
Start: 2024-09-04 | End: 2024-09-06

## 2024-09-04 RX ORDER — OXYCODONE HYDROCHLORIDE 5 MG/1
5-10 TABLET ORAL EVERY 4 HOURS PRN
Qty: 80 TABLET | Refills: 0 | Status: SHIPPED | OUTPATIENT
Start: 2024-09-04 | End: 2024-09-05

## 2024-09-04 RX ADMIN — LAMOTRIGINE 200 MG: 200 TABLET ORAL at 07:59

## 2024-09-04 RX ADMIN — TRAZODONE HYDROCHLORIDE 50 MG: 50 TABLET ORAL at 23:46

## 2024-09-04 RX ADMIN — OXYCODONE HYDROCHLORIDE 10 MG: 5 TABLET ORAL at 00:59

## 2024-09-04 RX ADMIN — MAGNESIUM HYDROXIDE 30 ML: 400 SUSPENSION ORAL at 10:57

## 2024-09-04 RX ADMIN — CYCLOBENZAPRINE 10 MG: 10 TABLET, FILM COATED ORAL at 07:59

## 2024-09-04 RX ADMIN — SENNOSIDES AND DOCUSATE SODIUM 2 TABLET: 50; 8.6 TABLET ORAL at 19:36

## 2024-09-04 RX ADMIN — FAMOTIDINE 20 MG: 20 TABLET ORAL at 19:36

## 2024-09-04 RX ADMIN — ACETAMINOPHEN 650 MG: 325 TABLET ORAL at 04:59

## 2024-09-04 RX ADMIN — OXYCODONE HYDROCHLORIDE 10 MG: 10 TABLET ORAL at 16:21

## 2024-09-04 RX ADMIN — HYDROXYZINE HYDROCHLORIDE 50 MG: 25 TABLET ORAL at 23:47

## 2024-09-04 RX ADMIN — ACETAMINOPHEN 650 MG: 325 TABLET ORAL at 07:58

## 2024-09-04 RX ADMIN — ACETAMINOPHEN 650 MG: 325 TABLET ORAL at 19:35

## 2024-09-04 RX ADMIN — POLYETHYLENE GLYCOL 3350 17 G: 17 POWDER, FOR SOLUTION ORAL at 19:35

## 2024-09-04 RX ADMIN — MINOCYCLINE HYDROCHLORIDE 100 MG: 100 CAPSULE ORAL at 07:59

## 2024-09-04 RX ADMIN — TAMSULOSIN HYDROCHLORIDE 0.4 MG: 0.4 CAPSULE ORAL at 07:58

## 2024-09-04 RX ADMIN — HYDROXYZINE HYDROCHLORIDE 50 MG: 25 TABLET ORAL at 07:59

## 2024-09-04 RX ADMIN — CYCLOBENZAPRINE 10 MG: 10 TABLET, FILM COATED ORAL at 13:26

## 2024-09-04 RX ADMIN — POLYETHYLENE GLYCOL 3350 17 G: 17 POWDER, FOR SOLUTION ORAL at 07:59

## 2024-09-04 RX ADMIN — OXYCODONE HYDROCHLORIDE 10 MG: 10 TABLET ORAL at 09:02

## 2024-09-04 RX ADMIN — OXYCODONE HYDROCHLORIDE 10 MG: 10 TABLET ORAL at 21:52

## 2024-09-04 RX ADMIN — CEFAZOLIN 1 G: 1 INJECTION, POWDER, FOR SOLUTION INTRAMUSCULAR; INTRAVENOUS at 04:59

## 2024-09-04 RX ADMIN — SENNOSIDES AND DOCUSATE SODIUM 2 TABLET: 50; 8.6 TABLET ORAL at 07:59

## 2024-09-04 RX ADMIN — HYDROMORPHONE HYDROCHLORIDE 0.4 MG: 1 INJECTION, SOLUTION INTRAMUSCULAR; INTRAVENOUS; SUBCUTANEOUS at 06:49

## 2024-09-04 RX ADMIN — ACETAMINOPHEN 650 MG: 325 TABLET ORAL at 13:26

## 2024-09-04 RX ADMIN — FAMOTIDINE 20 MG: 20 TABLET ORAL at 07:59

## 2024-09-04 RX ADMIN — CYCLOBENZAPRINE 10 MG: 10 TABLET, FILM COATED ORAL at 19:35

## 2024-09-04 RX ADMIN — ACETAMINOPHEN 650 MG: 325 TABLET ORAL at 16:21

## 2024-09-04 RX ADMIN — ACETAMINOPHEN 650 MG: 325 TABLET ORAL at 00:59

## 2024-09-04 RX ADMIN — OXYCODONE HYDROCHLORIDE 10 MG: 5 TABLET ORAL at 04:59

## 2024-09-04 RX ADMIN — Medication 25 MCG: at 07:58

## 2024-09-04 ASSESSMENT — ACTIVITIES OF DAILY LIVING (ADL)
ADLS_ACUITY_SCORE: 29

## 2024-09-04 NOTE — PLAN OF CARE
Occupational Therapy Discharge Summary    Reason for therapy discharge:    All goals and outcomes met, no further needs identified.    Progress towards therapy goal(s). See goals on Care Plan in [facility] electronic health record for goal details.  Goals met    Therapy recommendation(s):    No further therapy is recommended.

## 2024-09-04 NOTE — PLAN OF CARE
Goal Outcome Evaluation:      Plan of Care Reviewed With: patient    Overall Patient Progress: no changeOverall Patient Progress: no change    Outcome Evaluation: Pt complaining of pain level >7 overnight, and stating that PO medication not providing relief. VS continue to be stable. No reports of nausea during shift. Continues to ambulate well to bathroom, and continues to deny any difficulty with voiding. Dressing to incision continues to appear CDI.

## 2024-09-04 NOTE — PROGRESS NOTES
[facility]    Medicine Progress Note - Hospitalist Service, GOLD TEAM 19    Date of Admission:  8/30/2024    Assessment & Plan    Khoa Cedeño is a 44 year old female with a history of MDD, PTSD, and thoracolumbar scoliosis admitted s/p T10 to L4 instrumented posterior interbody fusion and colvin metcalf osteotomy on 8/30/24.    Interval changes: 9/4/2024  -Patient awake alert oriented x 3.  Complains of abdominal pain and not having bowel movement since Thursday last week.  Dose of MiraLAX and senna increased yesterday  -No vomiting.  Passing gas.  -Not tachycardic.  Vitals are stable.  Chest pain or dyspnea.  Telemetry discontinue    #S/p T10-L4 Instrumented Posterior Interbody Fusion and SPO on 8/30/24: By Dr. Hernandez.  mL. Pre-op Hgb 11.9. Last BM 8/29.  - Management per Orthopedics.  Dr. Hernandez Primary  Activity: Up with assist until independent No excessive bending or twisting. No lifting >10 lbs x 6 weeks. No Alejandro lift for transfers.   Weight bearing status: WBAT.  Pain management:   IV lidocaine: discontinue at 8am on POD#2 or earlier if complications arise.  Transition from IV to PO narcotics as tolerated. No NSAIDs   Antibiotics: Antibtioics until drains are removed  Diet: Begin with clear fluids and progress diet as tolerated.   DVT prophylaxis: SCDs only. No chemical DVT ppx needed.  Imaging: XR Upright Thoracolumbar 2 views PTDC - ordered.  Labs: Hgb POD 1  Bracing/Splinting: None.  Dressings: Keep Bandages clean and dry.   Drains: x2 Document output per shift, will be discontinued at Orthopedic Surgery discretion.  Monet catheter: Remove POD#1.   Physical Therapy/Occupational Therapy: Eval and treat  Cultures: none.    Consults: Hospitalist  Follow-up: Clinic with Dr. Hernandez in 6 weeks with repeat x-rays.   Disposition: Pending progress with therapies, pain control on orals, and medical stability, anticipate discharge to home on POD #3-5.    #  Hypocalcemia   -- Ca 7.3   --  Obtain ionized Ca--> Normal       #Post-op Dizziness:   - unclear etiology  -Suspect 2/2 side effect from IV Lidocaine. BP stable and normotensive.  - obtain orthostatic vitals  - Meclizine 12.5 mg TID PRN  - Encourage PO fluids.   - Consider Scopolamine patch if symptoms persist     #MDD, PTSD  - Continue PTA Lamictal and Trazodone prn     #Acne Vulgaris  - Hold PTA Minocycline, Benzoyl Peroxide, and Retin-A. Resume at discharge.          Diet: Advance Diet as Tolerated: Regular Diet Adult    DVT Prophylaxis: Pneumatic Compression Devices  Monet Catheter: Not present  Lines: None     Cardiac Monitoring: None  Code Status:  Full code    Clinically Significant Risk Factors                                             Disposition Plan     Medically Ready for Discharge: MINERVA Hernandez MD  Hospitalist Service, GOLD TEAM [address] [place] [address]  Securely message with [employer] (more info)  Text page via [employer] Paging/Directory   See signed in provider for up to date coverage information  ______________________________________________________________________    Interval History   Patient seen and examined at bedside in no acute distress. States significant pain overnight.   Pain persist.  No BM since Thursday last week.. Denies nausea, vomiting, or abd pain.    Physical Exam   Vital Signs: Temp: 98.4  F (36.9  C) Temp src: Oral BP: 101/60 Pulse: 100   Resp: 16 SpO2: 99 % O2 Device: None (Room air)    Weight: 135 lbs 12.85 oz    General Appearance: Appears comfortable.  Respiratory: Without wheezes rhonchi or rales.  CTA  Cardiovascular: RRR, without murmurs  GI: Soft, nontender, plus BS  Skin: Without obvious bleeding, bruising or excoriations      Medical Decision Making       59 MINUTES SPENT BY ME on the date of service doing chart review, history, exam, documentation & further activities per the note.      Data   CBC RESULTS:   Recent Labs    Lab Test 09/01/24  1608 08/31/24  0620   WBC  --  10.2   RBC  --  3.48*   HGB 9.6* 9.6*   HCT  --  29.4*   MCV  --  85   MCH  --  27.6   MCHC  --  32.7   RDW  --  13.7   PLT  --  175     Last Comprehensive Metabolic Panel:  Lab Results   Component Value Date     (L) 08/31/2024    POTASSIUM 4.3 08/31/2024    CHLORIDE 102 08/31/2024    CO2 24 08/31/2024    ANIONGAP 7 08/31/2024    GLC 91 09/01/2024    BUN 9.5 08/31/2024    CR 0.63 08/31/2024    GFRESTIMATED >90 08/31/2024    MAJO 7.3 (L) 08/31/2024

## 2024-09-04 NOTE — PROGRESS NOTES
"Orthopedic Surgery Progress Note:     Thoracic 10 to Lumbar 4 Instrumented Posterior Interbody Fusion and Nicole Woodson Osteotomy with O-Arm/Stealth Navigation, use of allograft, local autograft, and bone morphogenic protein from   8/30/24    Subjective:   Patient reports moderate pain throughout yesterday, despite using higher dose of oxycodone consistently between Q4 and 4.5 hours.   She voiding approximately every 3-5 hours yesterday, but  but reports only having one PVR yesterday.  Tolerating diet. Able to mobilize.    Objective:   /60 (BP Location: Right arm)   Pulse 100   Temp 98.4  F (36.9  C) (Oral)   Resp 16   Ht 1.651 m (5' 5\")   Wt 61.6 kg (135 lb 12.9 oz)   SpO2 99%   BMI 22.60 kg/m    No intake/output data recorded.  General: NAD. Resting comfortably in bed.  Respiratory: Breathing comfortably on RA.  Drain removed yesterday.  Musculoskeletal: Clean and dry bandages        Motor Strength Right Left   Hip flexion: L1, L2, L3 5/5 5/5   Hip adduction: L2, L3 5/5 5/5   Knee flexion: S1 5/5 5/5   Knee extension: L3, L4 5/5 5/5   Ankle dosiflexion: L4, L5 5/5 5/5   EHL: L5 5/5 5/5   Ankle plantarflexion: S1 5/5 5/5      Sensation from L1-S2 is preserved.    Laboratory Data:  Lab Results   Component Value Date    WBC 10.2 08/31/2024    HGB 9.6 (L) 09/01/2024     08/31/2024       Images:  Post op images personally reviewed, agree with radiology report.   XR IMPRESSION: Posterior spinal fusion extending from T10 through L4. No evidence of hardware complication. Slight right convex curvature of the thoracolumbar spine. Vertebral body heights are maintained.     Assessment & Plan:   Khoa Cedeño is a 44 year old female with PMH including PTSD MDD now s/p above procedure on 8/30/2024 with Dr. Hernandez. Procedure went well without complications.     Changes today:  Increased Oxy to 5-10mg Q 3 PRN. Patient aware.   Goals:  Post op constipation in setting of post op opioid use: use Milk of " Magnesia and consider additional suppository this afternoon. Encourage walking and relaxation breathing.   Patient voiding, take PVR and complete UA to evaluate for anterior pelvis tenderness. Alternative diagnosis for anterior pelvis pain is constipation, see above.     Upon evaluation today patient found resting comfortably. She refers that she experiences pain on her back that does not radiate towards legs. Pain service signed off yesterday, could consider review if PO oxycodone 10mg Q 3 is ineffective for managing pain.     Note: Patient lives in [place] and needs to get a ride. Would appreciate ~24 hour heads up before discharge. Told patient goal is discharge tomorrow afternoon (9/5) vs. Friday morning (9/6.)       Dr. Hernandez Primary  Activity: Up with assist until independent No excessive bending or twisting. No lifting >10 lbs x 6 weeks. No Alejandro lift for transfers.   Weight bearing status: WBAT.  Pain management:   IV lidocaine: discontinue at 8am on POD#2 or earlier if complications arise.  Transition from IV to PO narcotics as tolerated. No NSAIDs   Antibiotics: completed.   Diet:tolerating normal diet.   DVT prophylaxis: SCDs only. No chemical DVT ppx needed.  Imaging: completed.   Labs: Hgb POD 1  Bracing/Splinting: None.  Dressings: Keep Bandages clean and dry.   Drains: discontinued  Monet catheter: Removed  Physical Therapy/Occupational Therapy: Eval and treat  Cultures: none.    Consults: Hospitalist  Follow-up: Clinic with Dr. Hernandez in 6 weeks with repeat x-rays.   Disposition: Pending progress with therapies, pain control on orals, and medical stability, anticipate discharge to home on POD #3-5.    BIJU Tompkins

## 2024-09-04 NOTE — PLAN OF CARE
Physical Therapy Discharge Summary    Reason for therapy discharge:    All goals and outcomes met, no further needs identified.    Progress towards therapy goal(s). See goals on Care Plan in [facility] electronic health record for goal details.  Goals met    Therapy recommendation(s):    Continued therapy is recommended.  Rationale/Recommendations:  Pt would benefit from OP PT to progress strength and overall IND with mobility to work back towards mobility without use of walker.    At this time pt is moving well with use of walker, safe to be getting up to bathroom with walker, please encourage ambulation in halls with staff until discharge.

## 2024-09-04 NOTE — PROGRESS NOTES
"Orthopedic Surgery Progress Note:     Thoracic 10 to Lumbar 4 Instrumented Posterior Interbody Fusion and Nicole Woodson Osteotomy with O-Arm/Stealth Navigation, use of allograft, local autograft, and bone morphogenic protein      8/30/24    Subjective:   Patient complains of pain mostly a pressure like pain on her lower abdomen. Refers that she is able to void without issue subjectively.     Objective:   /70 (BP Location: Right arm, Patient Position: Semi-Almeida's, Cuff Size: Adult Regular)   Pulse 90   Temp 97.7  F (36.5  C) (Oral)   Resp 16   Ht 1.651 m (5' 5\")   Wt 61.6 kg (135 lb 12.9 oz)   SpO2 98%   BMI 22.60 kg/m    No intake/output data recorded.  General: NAD. Resting comfortably in bed.  Respiratory: Breathing comfortably on RA.  Abdominal: No Rebound tenderness, No Willard sign. Some diffuse discomfort on lower abdomen, No marked suprapubic tenderness, no distention.   Musculoskeletal: Clean and dry bandages        Motor Strength Right Left   Hip flexion: L1, L2, L3 5/5 5/5   Hip adduction: L2, L3 5/5 5/5   Knee flexion: S1 5/5 5/5   Knee extension: L3, L4 5/5 5/5   Ankle dosiflexion: L4, L5 5/5 5/5   EHL: L5 5/5 5/5   Ankle plantarflexion: S1 5/5 5/5      Sensation from L1-S2 is preserved.    Laboratory Data:  Lab Results   Component Value Date    WBC 10.2 08/31/2024    HGB 9.6 (L) 09/01/2024     08/31/2024       Images:  Postop images done, and in chart.   9/3/24  Postop images with good implant position from T10 to L4, no evidence of implant subsidence or fractures, screw pullout, loss of reduction, no signs of early junctional failure.     Assessment & Plan:   Khoa Cedeño is a 44 year old female with PMH including PTSD MDD now s/p above procedure on 8/30/2024 with Dr. Hernandez. Procedure went well without complications.      Upon evaluation today patient found resting comfortably. She mentions that she still has pain and discomfort but when asked where mostly she mentioned that " it was mostly in her back and lower abdomen. Abdominal examination is benign no rebound tenderness, and patient denies vomiting and is able to pass gas, although she states that she has not been able to have a bowel movement.     States that this abdominal pain is different to urine retention, and mostly believes it may be due to constipation. Nevertheless, due to history of urine retention during admission and fullness in lower abdomen, bladder scan to be done and indwelling catheter reinsertion to follow if there is evidence of urine retention       Will monitor pain levels     Dr. Hernandez Primary  Activity: Up with assist until independent No excessive bending or twisting. No lifting >10 lbs x 6 weeks. No Alejandro lift for transfers.   Weight bearing status: WBAT.  Pain management:   IV lidocaine: discontinue at 8am on POD#2 or earlier if complications arise.  Transition from IV to PO narcotics as tolerated. No NSAIDs   Antibiotics: Antibtioics until drains are removed  Diet: Begin with clear fluids and progress diet as tolerated.   DVT prophylaxis: SCDs only. No chemical DVT ppx needed.  Imaging: XR Upright Thoracolumbar 2 views PTDC - ordered.  Labs: Hgb POD 1  Bracing/Splinting: None.  Dressings: Keep Bandages clean and dry.   Drains: x2 Document output per shift, will be discontinued at Orthopedic Surgery discretion.  Monet catheter: Remove POD#1.   Physical Therapy/Occupational Therapy: Eval and treat  Cultures: none.    Consults: Hospitalist  Follow-up: Clinic with Dr. Hernandez in 6 weeks with repeat x-rays.   Disposition: Pending progress with therapies, pain control on orals, and medical stability, anticipate discharge to home on POD #3-5.    Justyn Valladares MD  Spine Fellow     PLEASE PAGE ME directly with any questions/concerns during regular weekday hours before 5 pm. If there is no response, if it is a weekend, or if it is during evening hours then please page the orthopedic surgery resident on  call.    FOLLOWUP:    Future Appointments   Date Time Provider Department Center   9/4/2024  9:45 AM Martha Rodriguez OT [facility] [place]   9/4/2024  1:30 PM Aleta Livingston, PT [facility] [place]   10/15/2024 11:40 AM Hansel [facility] MD [facility]

## 2024-09-04 NOTE — PROGRESS NOTES
"VS: /68 (BP Location: Right arm)   Pulse 96   Temp 97.7  F (36.5  C) (Oral)   Resp 16   Ht 1.651 m (5' 5\")   Wt 61.6 kg (135 lb 12.9 oz)   SpO2 96%   BMI 22.60 kg/m       O2: Sating >90% on RA. Lung sounds clear. Denies chest pain and SOB.   Output: Voids spontaneously and adequately.  Jacobs removed during shift no signs of retention    Last BM: 8/26 Suppository given    Activity: Up with SBA/ IND W waker    Skin: Surgical incision to the back dressing CDI    Pain: Pain was managed with PO Oxy this shift and scheduled Tylenol    CMS: A&Ox4. Denies N/T.    Dressing: Dressing to Back  C/D/I.   Diet: Regular diet thin liquids fair intake    LDA: PIV to R forearm SL    Equipment: IV pole, FWW, gait belt, and personal belongings. Call light within reach and uses appropriately.   Plan:  TBD    Additional Info: X- Ray done during shift. Hemovac and jacobs removed.        "

## 2024-09-05 PROCEDURE — 250N000013 HC RX MED GY IP 250 OP 250 PS 637: Performed by: STUDENT IN AN ORGANIZED HEALTH CARE EDUCATION/TRAINING PROGRAM

## 2024-09-05 PROCEDURE — 250N000013 HC RX MED GY IP 250 OP 250 PS 637: Performed by: PHYSICIAN ASSISTANT

## 2024-09-05 PROCEDURE — 250N000013 HC RX MED GY IP 250 OP 250 PS 637: Performed by: NURSE PRACTITIONER

## 2024-09-05 PROCEDURE — 250N000013 HC RX MED GY IP 250 OP 250 PS 637

## 2024-09-05 PROCEDURE — 120N000002 HC R&B MED SURG/OB UMMC

## 2024-09-05 PROCEDURE — 99232 SBSQ HOSP IP/OBS MODERATE 35: CPT | Performed by: INTERNAL MEDICINE

## 2024-09-05 PROCEDURE — 250N000013 HC RX MED GY IP 250 OP 250 PS 637: Performed by: INTERNAL MEDICINE

## 2024-09-05 RX ORDER — HYDROMORPHONE HYDROCHLORIDE 2 MG/1
2 TABLET ORAL
Status: DISCONTINUED | OUTPATIENT
Start: 2024-09-05 | End: 2024-09-06 | Stop reason: HOSPADM

## 2024-09-05 RX ORDER — METHOCARBAMOL 500 MG/1
500 TABLET, FILM COATED ORAL EVERY 6 HOURS
Status: DISCONTINUED | OUTPATIENT
Start: 2024-09-05 | End: 2024-09-06 | Stop reason: HOSPADM

## 2024-09-05 RX ORDER — HYDROMORPHONE HYDROCHLORIDE 4 MG/1
4 TABLET ORAL
Status: DISCONTINUED | OUTPATIENT
Start: 2024-09-05 | End: 2024-09-06 | Stop reason: HOSPADM

## 2024-09-05 RX ADMIN — OXYCODONE HYDROCHLORIDE 10 MG: 10 TABLET ORAL at 08:28

## 2024-09-05 RX ADMIN — ACETAMINOPHEN 650 MG: 325 TABLET ORAL at 05:00

## 2024-09-05 RX ADMIN — FAMOTIDINE 20 MG: 20 TABLET ORAL at 08:21

## 2024-09-05 RX ADMIN — LAMOTRIGINE 200 MG: 200 TABLET ORAL at 08:21

## 2024-09-05 RX ADMIN — ACETAMINOPHEN 650 MG: 325 TABLET ORAL at 11:02

## 2024-09-05 RX ADMIN — Medication 1 LOZENGE: at 19:34

## 2024-09-05 RX ADMIN — ACETAMINOPHEN 650 MG: 325 TABLET ORAL at 16:28

## 2024-09-05 RX ADMIN — HYDROXYZINE HYDROCHLORIDE 50 MG: 25 TABLET ORAL at 17:51

## 2024-09-05 RX ADMIN — POLYETHYLENE GLYCOL 3350 17 G: 17 POWDER, FOR SOLUTION ORAL at 08:21

## 2024-09-05 RX ADMIN — OXYCODONE HYDROCHLORIDE 10 MG: 10 TABLET ORAL at 05:00

## 2024-09-05 RX ADMIN — OXYCODONE HYDROCHLORIDE 10 MG: 10 TABLET ORAL at 01:02

## 2024-09-05 RX ADMIN — TAMSULOSIN HYDROCHLORIDE 0.4 MG: 0.4 CAPSULE ORAL at 08:21

## 2024-09-05 RX ADMIN — HYDROMORPHONE HYDROCHLORIDE 4 MG: 4 TABLET ORAL at 21:00

## 2024-09-05 RX ADMIN — HYDROMORPHONE HYDROCHLORIDE 4 MG: 4 TABLET ORAL at 14:13

## 2024-09-05 RX ADMIN — Medication 1 LOZENGE: at 17:55

## 2024-09-05 RX ADMIN — HYDROMORPHONE HYDROCHLORIDE 4 MG: 4 TABLET ORAL at 17:51

## 2024-09-05 RX ADMIN — METHOCARBAMOL 500 MG: 500 TABLET ORAL at 16:27

## 2024-09-05 RX ADMIN — SENNOSIDES AND DOCUSATE SODIUM 2 TABLET: 50; 8.6 TABLET ORAL at 08:21

## 2024-09-05 RX ADMIN — Medication 25 MCG: at 08:21

## 2024-09-05 RX ADMIN — POLYETHYLENE GLYCOL 3350 17 G: 17 POWDER, FOR SOLUTION ORAL at 19:30

## 2024-09-05 RX ADMIN — METHOCARBAMOL 500 MG: 500 TABLET ORAL at 21:01

## 2024-09-05 RX ADMIN — ACETAMINOPHEN 650 MG: 325 TABLET ORAL at 08:21

## 2024-09-05 RX ADMIN — METHOCARBAMOL 500 MG: 500 TABLET ORAL at 11:02

## 2024-09-05 RX ADMIN — MINOCYCLINE HYDROCHLORIDE 100 MG: 100 CAPSULE ORAL at 08:21

## 2024-09-05 RX ADMIN — ACETAMINOPHEN 650 MG: 325 TABLET ORAL at 19:30

## 2024-09-05 RX ADMIN — SENNOSIDES AND DOCUSATE SODIUM 2 TABLET: 50; 8.6 TABLET ORAL at 19:30

## 2024-09-05 RX ADMIN — CYCLOBENZAPRINE 10 MG: 10 TABLET, FILM COATED ORAL at 08:21

## 2024-09-05 RX ADMIN — FAMOTIDINE 20 MG: 20 TABLET ORAL at 19:31

## 2024-09-05 ASSESSMENT — ACTIVITIES OF DAILY LIVING (ADL)
ADLS_ACUITY_SCORE: 25
ADLS_ACUITY_SCORE: 29
ADLS_ACUITY_SCORE: 25
ADLS_ACUITY_SCORE: 29
ADLS_ACUITY_SCORE: 25

## 2024-09-05 NOTE — PLAN OF CARE
Goal Outcome Evaluation:      Plan of Care Reviewed With: patient    Overall Patient Progress: improvingOverall Patient Progress: improving    Outcome Evaluation: No acute changes overnight. Patient reporting pain level improved from previous night. Well-managed with oral PRN medication. Continues to ambulate well with walker. Denies further concerns overnight.

## 2024-09-05 NOTE — PLAN OF CARE
"Goal Outcome Evaluation:      Plan of Care Reviewed With: patient    Overall Patient Progress: improving    End of shift Summary: See flowsheet for VS and detail assessments.     Changes this Shift: No acute changes this shift. A&Ox4, VSS, sats maintained on RA.  Denies chest pain, SOB, cough. Voiding. No BM this shift. Up ad zaria. CMS intact. R PIV SL.  Oxy changed to dilaudid this morning, however pt now reports oxycodone is more effective but would give dilaudid \"a few more tries.\"  Call light remains within reach, able to make needs known.      Plan: Cont POC.  "

## 2024-09-05 NOTE — PLAN OF CARE
Problem: Skin Injury Risk Increased  Goal: Skin Health and Integrity  Outcome: Progressing  Intervention: Plan: Nurse Driven Intervention: Moisture Management  Recent Flowsheet Documentation  Taken 9/4/2024 1720 by Karol Hernandez RN  Moisture Interventions: Encourage regular toileting  Taken 9/4/2024 1100 by Karol Hernandez RN  Moisture Interventions: Encourage regular toileting  Intervention: Plan: Nurse Driven Intervention: Friction and Shear  Recent Flowsheet Documentation  Taken 9/4/2024 1720 by Karol Hernandez RN  Friction/Shear Interventions: HOB 30 degrees or less  Taken 9/4/2024 1100 by Karol Hernandez RN  Friction/Shear Interventions: HOB 30 degrees or less  Intervention: Optimize Skin Protection  Recent Flowsheet Documentation  Taken 9/4/2024 1720 by Karol Hernandez RN  Pressure Reduction Techniques: frequent weight shift encouraged  Activity Management:   activity adjusted per tolerance   activity encouraged   ambulated in room  Head of Bed (HOB) Positioning: HOB at 20-30 degrees  Taken 9/4/2024 1100 by Karol Hernandez RN  Pressure Reduction Techniques: frequent weight shift encouraged  Activity Management:   activity adjusted per tolerance   activity encouraged   ambulated in room  Head of Bed (HOB) Positioning: HOB at 20-30 degrees     Problem: Spinal Surgery  Goal: Optimal Coping with Surgery  Outcome: Progressing  Intervention: Support Psychosocial Response to Surgery  Recent Flowsheet Documentation  Taken 9/4/2024 1720 by Karol Hernandez RN  Supportive Measures:   active listening utilized   verbalization of feelings encouraged   self-care encouraged  Taken 9/4/2024 1100 by Karol Hernandez RN  Supportive Measures:   active listening utilized   verbalization of feelings encouraged   self-care encouraged  Goal: Absence of Bleeding  Outcome: Progressing  Intervention: Monitor and Manage Bleeding  Recent Flowsheet Documentation  Taken 9/4/2024 1720 by Mary  JOYCE Roberson  Bleeding Management: dressing monitored  Taken 9/4/2024 1100 by Karol Hernandez RN  Bleeding Management: dressing monitored  Goal: Effective Bowel Elimination  Outcome: Progressing  Intervention: Enhance Bowel Motility and Elimination  Recent Flowsheet Documentation  Taken 9/4/2024 1720 by Karol Hernandez RN  Bowel Elimination Management: relaxation techniques promoted  Taken 9/4/2024 1100 by Karol Hernandez RN  Bowel Elimination Management: relaxation techniques promoted  Goal: Fluid and Electrolyte Balance  Outcome: Progressing  Intervention: Monitor and Manage Fluid and Electrolyte Balance  Recent Flowsheet Documentation  Taken 9/4/2024 1720 by Karol Hernandez RN  Fluid/Electrolyte Management: fluids provided  Taken 9/4/2024 1100 by Karol Hernandez RN  Fluid/Electrolyte Management: fluids provided  Goal: Optimal Functional Ability  Outcome: Progressing  Intervention: Optimize Functional Status  Recent Flowsheet Documentation  Taken 9/4/2024 1720 by Karol Hernandez RN  Activity Management:   activity adjusted per tolerance   activity encouraged   ambulated in room  Positioning/Transfer Devices:   pillows   in use  Taken 9/4/2024 1100 by Karol Hernandez RN  Activity Management:   activity adjusted per tolerance   activity encouraged   ambulated in room  Positioning/Transfer Devices:   pillows   in use  Goal: Absence of Infection Signs and Symptoms  Outcome: Progressing  Intervention: Prevent or Manage Infection  Recent Flowsheet Documentation  Taken 9/4/2024 1720 by Karol Hernandez RN  Infection Prevention:   rest/sleep promoted   single patient room provided  Taken 9/4/2024 1100 by Karol Hernandez RN  Infection Prevention:   rest/sleep promoted   single patient room provided  Goal: Optimal Neurologic Function  Outcome: Progressing  Intervention: Optimize Neurologic Function  Recent Flowsheet Documentation  Taken 9/4/2024 1720 by Karol Hernandez RN  Body  Position: position changed independently  Range of Motion: active ROM (range of motion) encouraged  Taken 9/4/2024 1100 by Karol Hernandez RN  Body Position: position changed independently  Range of Motion: active ROM (range of motion) encouraged  Goal: Anesthesia/Sedation Recovery  Outcome: Progressing  Intervention: Optimize Anesthesia Recovery  Recent Flowsheet Documentation  Taken 9/4/2024 1720 by Karol Hernandez RN  Safety Promotion/Fall Prevention:   activity supervised   clutter free environment maintained   increased rounding and observation   increase visualization of patient   lighting adjusted  Stabilization Measures: legs elevated  Taken 9/4/2024 1100 by Karol Hernandez RN  Safety Promotion/Fall Prevention:   activity supervised   clutter free environment maintained   increased rounding and observation   increase visualization of patient   lighting adjusted  Stabilization Measures: legs elevated  Goal: Optimal Pain Control and Function  Outcome: Progressing  Intervention: Prevent or Manage Pain  Recent Flowsheet Documentation  Taken 9/4/2024 1703 by Karol Hernandez RN  Pain Management Interventions: medication (see MAR)  Taken 9/4/2024 1326 by Karol Hernandez RN  Pain Management Interventions: medication (see MAR)  Taken 9/4/2024 0944 by Karol Hernandez RN  Pain Management Interventions: medication (see MAR)  Goal: Nausea and Vomiting Relief  Outcome: Progressing  Goal: Effective Urinary Elimination  Outcome: Progressing  Goal: Effective Oxygenation and Ventilation  Outcome: Progressing  Intervention: Optimize Oxygenation and Ventilation  Recent Flowsheet Documentation  Taken 9/4/2024 1720 by Karol Hernandez RN  Head of Bed (HOB) Positioning: HOB at 20-30 degrees  Taken 9/4/2024 1100 by Karol Hernandez RN  Head of Bed (HOB) Positioning: HOB at 20-30 degrees     Problem: Fall Injury Risk  Goal: Absence of Fall and Fall-Related Injury  Outcome: Progressing  Intervention:  Identify and Manage Contributors  Recent Flowsheet Documentation  Taken 9/4/2024 1720 by Karol Hernandez RN  Medication Review/Management: medications reviewed  Taken 9/4/2024 1100 by Karol Hernandez RN  Medication Review/Management: medications reviewed  Intervention: Promote Injury-Free Environment  Recent Flowsheet Documentation  Taken 9/4/2024 1720 by Karol Hernandez RN  Safety Promotion/Fall Prevention:   activity supervised   clutter free environment maintained   increased rounding and observation   increase visualization of patient   lighting adjusted  Taken 9/4/2024 1100 by Karol Hernandez RN  Safety Promotion/Fall Prevention:   activity supervised   clutter free environment maintained   increased rounding and observation   increase visualization of patient   lighting adjusted     Problem: Infection  Goal: Absence of Infection Signs and Symptoms  Outcome: Progressing     Problem: Gas Exchange Impaired  Goal: Optimal Gas Exchange  Outcome: Progressing  Intervention: Optimize Oxygenation and Ventilation  Recent Flowsheet Documentation  Taken 9/4/2024 1720 by Karol Hernandez RN  Head of Bed (HOB) Positioning: HOB at 20-30 degrees  Taken 9/4/2024 1100 by Karol Hernandez RN  Head of Bed (HOB) Positioning: HOB at 20-30 degrees   Goal Outcome Evaluation:

## 2024-09-05 NOTE — PROGRESS NOTES
"VS: /58 (BP Location: Left arm)   Pulse 110   Temp 98.4  F (36.9  C) (Oral)   Resp 18   Ht 1.651 m (5' 5\")   Wt 61.6 kg (135 lb 12.9 oz)   SpO2 100%   BMI 22.60 kg/m       O2: Sating >90% on RA. Lung sounds clear. Denies chest pain and SOB.   Output: Voids spontaneously and adequately.     Last BM: 9/4 small loose stool after PRN milk of Magnesium    Activity: IND in room     Skin: Surgical incision to the back dressing CDI    Pain: Pain was managed with PO Oxy this shift and scheduled Tylenol    CMS: A&Ox4. Denies N/T.    Dressing: Dressing to Back  C/D/I.   Diet: Regular diet thin liquids fair intake    LDA: PIV to R forearm SL    Equipment: IV pole, FWW, gait belt, and personal belongings. Call light within reach and uses appropriately.   Plan: Possible discharge tomorrow    Additional Info:        "

## 2024-09-05 NOTE — PROGRESS NOTES
[facility]    Medicine Progress Note - Hospitalist Service, GOLD TEAM 19    Date of Admission:  8/30/2024    Assessment & Plan    Khoa Cedeño is a 44 year old female with a history of MDD, PTSD, and thoracolumbar scoliosis admitted s/p T10 to L4 instrumented posterior interbody fusion and colvin metcalf osteotomy on 8/30/24.    Interval changes: 9/5/2024  -Patient awake alert oriented x 3.  Complains of abdominal pain and not having bowel movement since Thursday last week.  Dose of MiraLAX and senna increased yesterday and she had a liquid stool over night , still has lower abdominal pain complaints.   -No vomiting.  Passing gas.  -Not tachycardic.  Vitals are stable.  Chest pain or dyspnea.  Telemetry discontinue    #S/p T10-L4 Instrumented Posterior Interbody Fusion and SPO on 8/30/24: By Dr. Hernandez.  mL. Pre-op Hgb 11.9. Last BM 8/29.  - Management per Orthopedics.  Dr. Hernandez Primary  Activity: Up with assist until independent No excessive bending or twisting. No lifting >10 lbs x 6 weeks. No Alejandro lift for transfers.   Weight bearing status: WBAT.  Pain management:   IV lidocaine: discontinue at 8am on POD#2 or earlier if complications arise.  Transition from IV to PO narcotics as tolerated. No NSAIDs   Antibiotics: Antibtioics until drains are removed  Diet: Begin with clear fluids and progress diet as tolerated.   DVT prophylaxis: SCDs only. No chemical DVT ppx needed.  Imaging: XR Upright Thoracolumbar 2 views PTDC - ordered.  Labs: Hgb POD 1  Bracing/Splinting: None.  Dressings: Keep Bandages clean and dry.   Drains: x2 Document output per shift, will be discontinued at Orthopedic Surgery discretion.  Monet catheter: Remove POD#1.   Physical Therapy/Occupational Therapy: Eval and treat  Cultures: none.    Consults: Hospitalist  Follow-up: Clinic with Dr. Hernandez in 6 weeks with repeat x-rays.   Disposition: Pending progress with therapies, pain control  on orals, and medical stability, anticipate discharge to home on POD #3-5.    # Hypocalcemia   -- Ca 7.3   --  Obtain ionized Ca--> Normal       #Post-op Dizziness:   - unclear etiology  -Suspect 2/2 side effect from IV Lidocaine. BP stable and normotensive.  - obtain orthostatic vitals  - Meclizine 12.5 mg TID PRN  - Encourage PO fluids.   - Consider Scopolamine patch if symptoms persist     #MDD, PTSD  - Continue PTA Lamictal and Trazodone prn     #Acne Vulgaris  - Hold PTA Minocycline, Benzoyl Peroxide, and Retin-A. Resume at discharge.          Diet: Advance Diet as Tolerated: Regular Diet Adult  Discharge Instruction - Regular Diet Adult    DVT Prophylaxis: Pneumatic Compression Devices  Monet Catheter: Not present  Lines: None     Cardiac Monitoring: None  Code Status:  Full code    Clinically Significant Risk Factors   Discharge home tomorrow                      Disposition Plan     Medically Ready for Discharge: TBD             Kerwin Hernandez MD  Hospitalist Service, GOLD TEAM 19  [facility]  Securely message with [employer] (more info)  Text page via [employer] Paging/Directory   See signed in provider for up to date coverage information  ______________________________________________________________________    Interval History   Patient seen and examined at bedside in no acute distress. States significant pain overnight.   Pain persist.  No BM since Thursday last week.. Denies nausea, vomiting, or abd pain.    Physical Exam   Vital Signs: Temp: 97.8  F (36.6  C) Temp src: Oral BP: 109/63 Pulse: 104   Resp: 16 SpO2: 99 % O2 Device: None (Room air)    Weight: 135 lbs 12.85 oz    General Appearance: Appears comfortable.  Respiratory: Without wheezes rhonchi or rales.  CTA  Cardiovascular: RRR, without murmurs  GI: Soft, nontender, plus BS  Skin: Without obvious bleeding, bruising or excoriations      Medical Decision Making       59 MINUTES SPENT BY ME on the date of  service doing chart review, history, exam, documentation & further activities per the note.      Data   CBC RESULTS:   Recent Labs   Lab Test 09/01/24  1608 08/31/24  0620   WBC  --  10.2   RBC  --  3.48*   HGB 9.6* 9.6*   HCT  --  29.4*   MCV  --  85   MCH  --  27.6   MCHC  --  32.7   RDW  --  13.7   PLT  --  175     Last Comprehensive Metabolic Panel:  Lab Results   Component Value Date     (L) 08/31/2024    POTASSIUM 4.3 08/31/2024    CHLORIDE 102 08/31/2024    CO2 24 08/31/2024    ANIONGAP 7 08/31/2024    GLC 91 09/01/2024    BUN 9.5 08/31/2024    CR 0.63 08/31/2024    GFRESTIMATED >90 08/31/2024    MAJO 7.3 (L) 08/31/2024

## 2024-09-05 NOTE — PROGRESS NOTES
Orthopedic Surgery Progress Note 09/05/2024    Thoracic 10 to Lumbar 4 Instrumented Posterior Interbody Fusion and Nicole Woodson Osteotomy with O-Arm/Stealth Navigation, use of allograft, local autograft, and bone morphogenic protein      8/30/24    S: No acute events overnight. Pain controlled. States that pain has improved. Denies BM. Denies any new concerns.    O:  Temp: 98.4  F (36.9  C) Temp src: Oral BP: 102/56 Pulse: 108   Resp: 18 SpO2: 97 % O2 Device: None (Room air)      Exam:  General: NAD. Resting comfortably in bed.  Respiratory: Breathing comfortably on RA.  Musculoskeletal: Clean and dry bandages        Motor Strength Right Left   Hip flexion: L1, L2, L3 5/5 5/5   Hip adduction: L2, L3 5/5 5/5   Knee flexion: S1 5/5 5/5   Knee extension: L3, L4 5/5 5/5   Ankle dosiflexion: L4, L5 5/5 5/5   EHL: L5 5/5 5/5   Ankle plantarflexion: S1 5/5 5/5      Sensation from L1-S2 is preserved.    Recent Labs   Lab 09/01/24  1608 08/31/24  0620   WBC  --  10.2   HGB 9.6* 9.6*   PLT  --  175         Assessment: Khoa Cedeño is a 44 year old female with PMH including PTSD MDD now s/p above procedure on 8/30/2024 with Dr. Hernandez. Procedure went well without complications.      Voiding spontaneously overnight and pain is improved. States she would like to talk to pain management team prior to discharge. Passing gas but no BM yet.      Will continue to work on BM today. Plan to discharge either later today or tomorrow morning. Patient lives in [place].      Dr. Hernanedz Primary  Activity: Up with assist until independent No excessive bending or twisting. No lifting >10 lbs x 6 weeks. No Alejandro lift for transfers.   Weight bearing status: WBAT.  Pain management:   Transition from IV to PO narcotics as tolerated. No NSAIDs   Antibiotics: Antibtioics completed  Diet: Begin with clear fluids and progress diet as tolerated.   DVT prophylaxis: SCDs only. No chemical DVT ppx needed.  Imaging: XR Upright Thoracolumbar 2 views PTDC  - completed.  Bracing/Splinting: None.  Dressings: Keep Bandages clean and dry.   Drains: Removed  Physical Therapy/Occupational Therapy: Eval and treat  Cultures: none.    Consults: Hospitalist  Follow-up: Clinic with Dr. Hernandez in 6 weeks with repeat x-rays.   Disposition: Pending progress with therapies, pain control on orals, and medical stability, anticipate discharge to home on POD #3-5.       --  Jesús Hilton MD  Orthopedic Surgery PGY-4

## 2024-09-06 VITALS
BODY MASS INDEX: 22.63 KG/M2 | HEIGHT: 65 IN | SYSTOLIC BLOOD PRESSURE: 103 MMHG | WEIGHT: 135.8 LBS | TEMPERATURE: 98.5 F | DIASTOLIC BLOOD PRESSURE: 63 MMHG | OXYGEN SATURATION: 100 % | HEART RATE: 108 BPM | RESPIRATION RATE: 16 BRPM

## 2024-09-06 PROCEDURE — 250N000013 HC RX MED GY IP 250 OP 250 PS 637: Performed by: INTERNAL MEDICINE

## 2024-09-06 PROCEDURE — 99231 SBSQ HOSP IP/OBS SF/LOW 25: CPT | Performed by: INTERNAL MEDICINE

## 2024-09-06 PROCEDURE — 250N000013 HC RX MED GY IP 250 OP 250 PS 637: Performed by: PHYSICIAN ASSISTANT

## 2024-09-06 PROCEDURE — 250N000013 HC RX MED GY IP 250 OP 250 PS 637: Performed by: STUDENT IN AN ORGANIZED HEALTH CARE EDUCATION/TRAINING PROGRAM

## 2024-09-06 PROCEDURE — 250N000013 HC RX MED GY IP 250 OP 250 PS 637: Performed by: NURSE PRACTITIONER

## 2024-09-06 RX ORDER — HYDROMORPHONE HYDROCHLORIDE 2 MG/1
2 TABLET ORAL
Qty: 66 TABLET | Refills: 0 | Status: CANCELLED | OUTPATIENT
Start: 2024-09-06

## 2024-09-06 RX ORDER — METHOCARBAMOL 500 MG/1
500 TABLET, FILM COATED ORAL EVERY 6 HOURS
Qty: 40 TABLET | Refills: 0 | Status: SHIPPED | OUTPATIENT
Start: 2024-09-06

## 2024-09-06 RX ORDER — TAMSULOSIN HYDROCHLORIDE 0.4 MG/1
0.4 CAPSULE ORAL DAILY
Qty: 30 CAPSULE | Refills: 0 | Status: SHIPPED | OUTPATIENT
Start: 2024-09-07 | End: 2024-09-06

## 2024-09-06 RX ORDER — HYDROMORPHONE HYDROCHLORIDE 2 MG/1
2-4 TABLET ORAL
Qty: 66 TABLET | Refills: 0 | Status: SHIPPED | OUTPATIENT
Start: 2024-09-06 | End: 2024-09-10

## 2024-09-06 RX ORDER — TAMSULOSIN HYDROCHLORIDE 0.4 MG/1
0.4 CAPSULE ORAL DAILY
Qty: 30 CAPSULE | Refills: 0 | Status: SHIPPED | OUTPATIENT
Start: 2024-09-07

## 2024-09-06 RX ORDER — METHOCARBAMOL 500 MG/1
500 TABLET, FILM COATED ORAL 4 TIMES DAILY PRN
Qty: 90 TABLET | Refills: 1 | Status: SHIPPED | OUTPATIENT
Start: 2024-09-06

## 2024-09-06 RX ADMIN — ACETAMINOPHEN 650 MG: 325 TABLET ORAL at 07:48

## 2024-09-06 RX ADMIN — Medication 25 MCG: at 07:48

## 2024-09-06 RX ADMIN — MINOCYCLINE HYDROCHLORIDE 100 MG: 100 CAPSULE ORAL at 07:48

## 2024-09-06 RX ADMIN — METHOCARBAMOL 500 MG: 500 TABLET ORAL at 04:07

## 2024-09-06 RX ADMIN — FAMOTIDINE 20 MG: 20 TABLET ORAL at 07:48

## 2024-09-06 RX ADMIN — HYDROXYZINE HYDROCHLORIDE 50 MG: 25 TABLET ORAL at 11:55

## 2024-09-06 RX ADMIN — HYDROMORPHONE HYDROCHLORIDE 4 MG: 4 TABLET ORAL at 07:47

## 2024-09-06 RX ADMIN — ACETAMINOPHEN 650 MG: 325 TABLET ORAL at 11:55

## 2024-09-06 RX ADMIN — LAMOTRIGINE 200 MG: 200 TABLET ORAL at 07:48

## 2024-09-06 RX ADMIN — HYDROMORPHONE HYDROCHLORIDE 4 MG: 4 TABLET ORAL at 13:34

## 2024-09-06 RX ADMIN — METHOCARBAMOL 500 MG: 500 TABLET ORAL at 10:35

## 2024-09-06 RX ADMIN — POLYETHYLENE GLYCOL 3350 17 G: 17 POWDER, FOR SOLUTION ORAL at 07:48

## 2024-09-06 RX ADMIN — ACETAMINOPHEN 650 MG: 325 TABLET ORAL at 00:18

## 2024-09-06 RX ADMIN — TAMSULOSIN HYDROCHLORIDE 0.4 MG: 0.4 CAPSULE ORAL at 07:48

## 2024-09-06 RX ADMIN — SENNOSIDES AND DOCUSATE SODIUM 2 TABLET: 50; 8.6 TABLET ORAL at 07:48

## 2024-09-06 RX ADMIN — HYDROMORPHONE HYDROCHLORIDE 4 MG: 4 TABLET ORAL at 04:07

## 2024-09-06 RX ADMIN — HYDROMORPHONE HYDROCHLORIDE 4 MG: 4 TABLET ORAL at 00:19

## 2024-09-06 ASSESSMENT — ACTIVITIES OF DAILY LIVING (ADL)
ADLS_ACUITY_SCORE: 25

## 2024-09-06 NOTE — PROGRESS NOTES
[facility]    Medicine Progress Note - Hospitalist Service, GOLD TEAM 19    Date of Admission:  8/30/2024    Assessment & Plan    Khoa Cedeño is a 44 year old female with a history of MDD, PTSD, and thoracolumbar scoliosis admitted s/p T10 to L4 instrumented posterior interbody fusion and colvin metcalf osteotomy on 8/30/24.    Interval changes: 9/6/2024  -Patient awake alert oriented x 3.  Complains of abdominal pain and not having bowel movement since Thursday last week.  Dose of -MiraLAX and senna increased yesterday and she had a liquid stool over night , still has lower abdominal pain complaints.   -No vomiting.  Passing gas.  -Not tachycardic.  Vitals are stable.  Chest pain or dyspnea.  Telemetry discontinued  -Medically stable     #S/p T10-L4 Instrumented Posterior Interbody Fusion and SPO on 8/30/24: By Dr. Hernandez.  mL. Pre-op Hgb 11.9. Last BM 8/29.  - Management per Orthopedics.  Dr. Hernandez Primary  Activity: Up with assist until independent No excessive bending or twisting. No lifting >10 lbs x 6 weeks. No Alejandro lift for transfers.   Weight bearing status: WBAT.  Pain management:   IV lidocaine: discontinue at 8am on POD#2 or earlier if complications arise.  Transition from IV to PO narcotics as tolerated. No NSAIDs   Antibiotics: Antibtioics until drains are removed  Diet: Begin with clear fluids and progress diet as tolerated.   DVT prophylaxis: SCDs only. No chemical DVT ppx needed.  Imaging: XR Upright Thoracolumbar 2 views PTDC - ordered.  Labs: Hgb POD 1  Bracing/Splinting: None.  Dressings: Keep Bandages clean and dry.   Drains: x2 Document output per shift, will be discontinued at Orthopedic Surgery discretion.  Monet catheter: Remove POD#1.   Physical Therapy/Occupational Therapy: Eval and treat  Cultures: none.    Consults: Hospitalist  Follow-up: Clinic with Dr. Hernandez in 6 weeks with repeat x-rays.   Disposition: Pending progress with  therapies, pain control on orals, and medical stability, anticipate discharge to home on POD #3-5.    # Hypocalcemia   -- Ca 7.3   --  Obtain ionized Ca--> Normal       #Post-op Dizziness:   - unclear etiology  -Suspect 2/2 side effect from IV Lidocaine. BP stable and normotensive.  - obtain orthostatic vitals  - Meclizine 12.5 mg TID PRN  - Encourage PO fluids.   - Consider Scopolamine patch if symptoms persist     #MDD, PTSD  - Continue PTA Lamictal and Trazodone prn     #Acne Vulgaris  - Hold PTA Minocycline, Benzoyl Peroxide, and Retin-A. Resume at discharge.          Diet: Advance Diet as Tolerated: Regular Diet Adult  Discharge Instruction - Regular Diet Adult    DVT Prophylaxis: Pneumatic Compression Devices  Monet Catheter: Not present  Lines: None     Cardiac Monitoring: None  Code Status:  Full code    Clinically Significant Risk Factors   Discharge home tomorrow                      Disposition Plan     Medically Ready for Discharge:today              Kerwin Hernandez MD  Hospitalist Service, GOLD TEAM 19  [facility]  Securely message with [employer] (more info)  Text page via [employer] Paging/Directory   See signed in provider for up to date coverage information  ______________________________________________________________________    Interval History   Patient seen and examined at bedside in no acute distress. States significant pain overnight.   Pain persist.  No BM since Thursday last week.. Denies nausea, vomiting, or abd pain.    Physical Exam   Vital Signs: Temp: 98.5  F (36.9  C) Temp src: Oral BP: 103/63 Pulse: 108   Resp: 16 SpO2: 100 % O2 Device: None (Room air)    Weight: 135 lbs 12.85 oz    General Appearance: Appears comfortable.  Respiratory: Without wheezes rhonchi or rales.  CTA  Cardiovascular: RRR, without murmurs  GI: Soft, nontender, plus BS  Skin: Without obvious bleeding, bruising or excoriations      Medical Decision Making       59 MINUTES  SPENT BY ME on the date of service doing chart review, history, exam, documentation & further activities per the note.      Data   CBC RESULTS:   Recent Labs   Lab Test 09/01/24  1608 08/31/24  0620   WBC  --  10.2   RBC  --  3.48*   HGB 9.6* 9.6*   HCT  --  29.4*   MCV  --  85   MCH  --  27.6   MCHC  --  32.7   RDW  --  13.7   PLT  --  175     Last Comprehensive Metabolic Panel:  Lab Results   Component Value Date     (L) 08/31/2024    POTASSIUM 4.3 08/31/2024    CHLORIDE 102 08/31/2024    CO2 24 08/31/2024    ANIONGAP 7 08/31/2024    GLC 91 09/01/2024    BUN 9.5 08/31/2024    CR 0.63 08/31/2024    GFRESTIMATED >90 08/31/2024    MAJO 7.3 (L) 08/31/2024

## 2024-09-06 NOTE — PROGRESS NOTES
Orthopedic Surgery Progress Note 09/06/2024    Thoracic 10 to Lumbar 4 Instrumented Posterior Interbody Fusion and Nicole Woodson Osteotomy with O-Arm/Stealth Navigation, use of allograft, local autograft, and bone morphogenic protein      8/30/24      S: No acute events overnight. Pain controlled at this time. Voiding spontaneously. No BM but passing gas.  Denies any new concerns.    O:  Temp: 98.3  F (36.8  C) Temp src: Oral BP: 97/55 Pulse: 105   Resp: 16 SpO2: 98 % O2 Device: None (Room air)      Exam:  General: NAD. Resting comfortably in bed.  Respiratory: Breathing comfortably on RA.  Musculoskeletal: Clean and dry bandages        Motor Strength Right Left   Hip flexion: L1, L2, L3 5/5 5/5   Hip adduction: L2, L3 5/5 5/5   Knee flexion: S1 5/5 5/5   Knee extension: L3, L4 5/5 5/5   Ankle dosiflexion: L4, L5 5/5 5/5   EHL: L5 5/5 5/5   Ankle plantarflexion: S1 5/5 5/5      Sensation from L1-S2 is preserved.    Recent Labs   Lab 09/01/24  1608 08/31/24  0620   WBC  --  10.2   HGB 9.6* 9.6*   PLT  --  175         Assessment: Khoa Cedeño is a 44 year old female with PMH including PTSD MDD now s/p above procedure on 8/30/2024 with Dr. Hernandez. Procedure went well without complications.      Voiding spontaneously overnight and pain is improved. States she would like to talk to pain management team prior to discharge. Passing gas but no BM yet.      Will continue to work on BM today. Plan to discharge home today.     Dr. Hernandez Primary  Activity: Up with assist until independent No excessive bending or twisting. No lifting >10 lbs x 6 weeks. No Alejandro lift for transfers.   Weight bearing status: WBAT.  Pain management:   Transition from IV to PO narcotics as tolerated. No NSAIDs   Antibiotics: Antibtioics completed  Diet: Begin with clear fluids and progress diet as tolerated.   DVT prophylaxis: SCDs only. No chemical DVT ppx needed.  Imaging: XR Upright Thoracolumbar 2 views PTDC - completed.  Bracing/Splinting:  None.  Dressings: Keep Bandages clean and dry.   Drains: Removed  Physical Therapy/Occupational Therapy: Eval and treat  Cultures: none.    Consults: Hospitalist  Follow-up: Clinic with Dr. Hernandez in 6 weeks with repeat x-rays.   Disposition: Pending progress with therapies, pain control on orals, and medical stability, anticipate discharge to home on POD #3-5.    --  Jesús Hilton MD  Orthopedic Surgery PGY-4

## 2024-09-06 NOTE — PHARMACY
Home medication that were stored at the central pharmacy returned to pt at 1222. Medications handed directly to pt in pts room (). Parents present in the room when medications were given to pt.       Laure Martinez RN, BSN, PHN on 9/6/2024 at 12:24 PM

## 2024-09-06 NOTE — PLAN OF CARE
Goal Outcome Evaluation:      Plan of Care Reviewed With: patient    Overall Patient Progress: no changeOverall Patient Progress: no change    Outcome Evaluation: No acute changes overnight. Patient reporting no real difference between previous regimen of oxy/flexeril and new regimen of dilaudid/robaxin. Pain level continues to be 7+ prior to administration and ~5-7 following medication. VS remain stable. Continues to ambulate well with walker. Denies further concerns overnight.

## 2024-09-06 NOTE — PROGRESS NOTES
DISCHARGE SUMMARY    Pt discharging to: Home w/ family  Transportation: Wheelchair to personal vehicle. Pt has a .  AVS given and discussed: Yes, Pt verbalized understanding and all questions were answered.   Medications given: Yes, Pt verbalized understanding of medication and questions were answered.  Belongings returned: Yes, belongings stayed w/ pt during the stay.  Comments: Pt was safely transported via wheelchair w/ hospital transport without complication or concerns off the unit.    Paperwork turned into paperwork bin at nursing station. Pt pharmacy box was emptied and items were placed in return to pharmacy bin. Pain medication given before discharge per provider and pt request. Hard prescription given to pt.      Discharged by: Laure Martinez, RN, BSN, PHN

## 2024-09-06 NOTE — PLAN OF CARE
Goal Outcome Evaluation:  Plan of care reviewed with: Patient & Parents  Overall patient progress: Improving    Outcome Evaluation: Planned for discharge today. Pain continues to be 7+ prior to medication administration and will drop to about a 5/10 after medication. No BM yet, but passing flatus. Ambulating w/out concern w/ walker. Denies further concerns at this time.    Shift: 0700 - 1410    Vital Signs: Temp: 98.5  F (36.9  C) Temp src: Oral BP: 103/63 Pulse: 108   Resp: 16 SpO2: 100 % O2 Device: None (Room air)     CMS/Neuro: A&O x4   Denies numbness & tingling     Cardio/Resp: No SOB or chest pain     Output: Continent of B/B - LBM: 8/29/24  Provider aware of LBM.   Pt ambulating, eating & drinking, and currently on bowel meds.     Activity: Independently w/ walker.     Skin: Intact - No rashes, abrasions, or lesions  Surgical incision to back     Pain: 5-7/10 - Controlled w/ Scheduled & PRN medication     Dressing: Surgical dressing - CDI     LDA: R. Upper forearm PIV removed by RN w/out complication, Covered w/ gauze & tape     Diet: Regular, Thin liquids, Good appetite, Medication whole     Additional Info: Call light w/in reach and able to make needs known  Planned to discharge around 1300.       Laure Martinez, RN, BSN, PHN

## 2024-09-09 NOTE — DISCHARGE SUMMARY
ORTHOPEDIC SURGERY DISCHARGE SUMMARY     Date of Admission: 8/30/2024  Date of Discharge: 9/6/2024  2:32 PM  Disposition: Home  Staff Physician: Hansel Hernandez MD  Primary Care Provider: Audrey Carlson    DISCHARGE DIAGNOSIS:  Adolescent idiopathic scoliosis of thoracolumbar region [M41.125]    PROCEDURES: Procedure(s):  Thoracic 10 to Lumbar 4 Instrumented Posterior Interbody Fusion and Nicole Woodson Osteotomy with O-Arm/Stealth Navigation, use of allograft, local autograft, and bone morphogenic protein  on 8/30/2024    BRIEF HISTORY:  This is a 44 year old patient who has been followed in clinic. Please refer to that documentation for full details. Briefly, the patient has adult spinal deformity secondary to adolescent idiopathic scoliosis of the thoracolumbar spine.. The patient has failed conservative treatment of symptoms and desires more definitive intervention. Therefore, after reviewing non-operative and operative options including the risks and benefits associated with each, the patient elected to proceed with the above stated procedure.     HOSPITAL COURSE:    The patient was admitted following the above listed procedures for pain control and rehabilitation. Khoa Cedeño did well post-operatively. Medicine was consulted post operatively to aid in management of medical co-morbidities. The patient received routine nursing cares and at the time of discharge was medically stable. Vital signs were stable throughout admission. The patient is tolerating a regular diet and is voiding spontaneously. All PT/OT goals have been met for safe mobility. Pain is now controlled on oral medications which will be available on discharge. Stool softeners have been used while taking pain medications to help prevent constipation. Khoa Cedeño is deemed medically safe to discharge.     Antibiotics:  Ancef Was given until drains were removed  DVT prophylaxis:  Mechanical  PT Progress:  Has met PT/OT goals for safe  mobility.   Pain Meds:  Weaned off all IV pain meds by discharge.  Inpatient Events:  No significant postoperative events or complications.     PHYSICAL EXAM:    General: NAD. Resting comfortably in bed.  Respiratory: Breathing comfortably on RA.  Musculoskeletal: Clean and dry bandages        Motor Strength Right Left   Hip flexion: L1, L2, L3 5/5 5/5   Hip adduction: L2, L3 5/5 5/5   Knee flexion: S1 5/5 5/5   Knee extension: L3, L4 5/5 5/5   Ankle dosiflexion: L4, L5 5/5 5/5   EHL: L5 5/5 5/5   Ankle plantarflexion: S1 5/5 5/5      Sensation from L1-S2 is preserved.    FOLLOWUP:        Future Appointments   Date Time Provider Department Center   10/15/2024 11:40 AM Hansel Hernandez MD [facility]       Orthopedic Surgery appointments are at the [facility] ([address] [place] [address]). Call [phone] to schedule a follow-up appointment at this location with your provider.     PLANNED DISCHARGE ORDERS:      Discharge Medication List as of 9/6/2024  1:26 PM        START taking these medications    Details   acetaminophen (TYLENOL) 325 MG tablet Take 2 tablets (650 mg) by mouth every 4 hours., OTC      HYDROmorphone (DILAUDID) 2 MG tablet Take 1-2 tablets (2-4 mg) by mouth every 3 hours as needed for pain or moderate to severe pain., Disp-66 tablet, R-0, E-Prescribe      hydrOXYzine HCl (ATARAX) 25 MG tablet Take 1 tablet (25 mg) by mouth every 6 hours as needed for other (adjuvant pain)., Disp-50 tablet, R-0, E-Prescribe      !! methocarbamol (ROBAXIN) 500 MG tablet Take 1 tablet (500 mg) by mouth every 6 hours., Disp-40 tablet, R-0, E-Prescribe      !! methocarbamol (ROBAXIN) 500 MG tablet Take 1 tablet (500 mg) by mouth 4 times daily as needed for muscle spasms., Disp-90 tablet, R-1, E-Prescribe      polyethylene glycol (MIRALAX) 17 GM/Dose powder Take 17 g by mouth 2 times daily., Disp-510 g, R-0, E-Prescribe      senna-docusate (SENOKOT-S/PERICOLACE) 8.6-50 MG  "tablet Take 2 tablets by mouth 2 times daily., OTC      Vitamin D3 (CHOLECALCIFEROL) 25 mcg (1000 units) tablet Take 1 tablet (25 mcg) by mouth daily., OTC       !! - Potential duplicate medications found. Please discuss with provider.        CONTINUE these medications which have CHANGED    Details   tamsulosin (FLOMAX) 0.4 MG capsule Take 1 capsule (0.4 mg) by mouth daily., Disp-30 capsule, R-0, Local Print           CONTINUE these medications which have NOT CHANGED    Details   clindamycin (CLEOCIN T) 1 % SWAB Apply 1 applicator topically 2 times daily., Historical      lamoTRIgine (LAMICTAL) 200 MG tablet Take 200 mg by mouth daily., Historical      minocycline (MINOCIN) 100 MG capsule Take 100 mg by mouth every morning, Historical      multivitamin w/minerals (MULTI-VITAMIN) tablet Take 1 tablet by mouth daily, Historical      traZODone (DESYREL) 50 MG tablet Take 50 mg by mouth nightly as needed, Historical      tretinoin (RETIN-A) 0.05 % external cream Apply topically at bedtimeHistorical      vitamin C with B complex (B COMPLEX-C) tablet Take 1 tablet by mouth daily, Historical           STOP taking these medications       benzoyl peroxide (PANOXYL) 10 % external liquid Comments:   Reason for Stopping:         cyclobenzaprine (FLEXERIL) 10 MG tablet Comments:   Reason for Stopping:         ibuprofen (ADVIL/MOTRIN) 200 MG tablet Comments:   Reason for Stopping:         oxyCODONE (ROXICODONE) 5 MG tablet Comments:   Reason for Stopping:                 Discharge Procedure Orders   Reason for your hospital stay   Order Comments: T10-L4 fusion     When to call - Contact Surgeon Team   Order Comments: You may experience symptoms that require follow-up before your scheduled appointment. Refer to the \"Stoplight Tool\" for instructions on when to contact your Surgeon Team if you are concerned about pain control, blood clots, constipation, or if you are unable to urinate.     When to call - Reach out to Urgent Care "   Order Comments: If you are not able to reach your Surgeon Team and you need immediate care, go to the Orthopedic Walk-in Clinic or Urgent Care at your Surgeon's office.  Do NOT go to the Emergency Room unless you have shortness of breath, chest pain, or other signs of a medical emergency.     When to call - Reasons to Call 911   Order Comments: Call 911 immediately if you experience sudden-onset chest pain, arm weakness/numbness, slurred speech, or shortness of breath     Discharge Instruction - Breathing exercises   Order Comments: Perform breathing exercises using your Incentive Spirometer 10 times per hour while awake for 2 weeks.     Symptoms - Fever Management   Order Comments: A low grade fever can be expected after surgery.  Use acetaminophen (TYLENOL) as needed for fever management.  Contact your Surgeon Team if you have a fever greater than 101.5 F, chills, and/or night sweats.     Symptoms - Constipation management   Order Comments: Constipation (hard, dry bowel movements) is expected after surgery due to the combination of being less active, the anesthetic, and the opioid pain medication.  You can do the following to help reduce constipation:  ~  FLUIDS:  Drink clear liquids (water or Gatorade), or juice (apple/prune).  ~  DIET:  Eat a fiber rich diet.    ~  ACTIVITY:  Get up and move around several times a day.  Increase your activity as you are able.  MEDICATIONS:  Reduce the risk of constipation by starting medications before you are constipated.  You can take Miralax   (1 packet as directed) and/or a stool softener (Senokot 1-2 tablets 1-2 times a day).  If you already have constipation and these medications are not working, you can get magnesium citrate and use as directed.  If you continue to have constipation you can try an over the counter suppository or enema.  Call your Surgeon Team if it has been greater than 3 days since your last bowel movement.     Symptoms - Reduced Urine Output   Order  Comments: Changes in the amount of fluids you drank before and after surgery may result in problems urinating.  It is important to stay well-hydrated after surgery and drink plenty of water. If it has been greater than 8 hours since you have urinated despite drinking plenty of water, call your Surgeon Team.     Activity - Exercises to prevent blood clots   Order Comments: Unless otherwise directed by your Surgeon team, perform the following exercises at least three times per day for the first four weeks after surgery to prevent blood clots in your legs: 1) Point and flex your feet (Ankle Pumps), 2) Move your ankle around in big circles, 3) Wiggle your toes, 4) Walk, even for short distances, several times a day, will help decrease the risk of blood clots.     Order Specific Question Answer Comments   Is discharge order? Yes      Comfort and Pain Management - Pain after Surgery   Order Comments: Pain after surgery is normal and expected.  You will have some amount of pain for several weeks after surgery.  Your pain will improve with time.  There are several things you can do to help reduce your pain including: rest, ice, elevation, and using pain medications as needed. Contact your Surgeon Team if you have pain that persists or worsens after surgery despite rest, ice, elevation, and taking your medication(s) as prescribed. Contact your Surgeon Team if you have new numbness, tingling, or weakness in your operative extremity.     Comfort and Pain Management - Swelling after Surgery   Order Comments: Swelling and/or bruising of the surgical extremity is common and may persist for several months after surgery. In addition to frequent icing and elevation, gentle compressive support with an ACE wrap or tubigrip may help with swelling. Apply compression regularly, removing at least twice daily to perform skin checks. Contact your Surgeon Team if your swelling increases and is NOT associated with an increase in your activity  level, or if your swelling increases and is associated with redness and pain.     Comfort and Pain Management - Cold therapy   Order Comments: Ice can be used to control swelling and discomfort after surgery. Place a thin towel over your operative site and apply the ice pack overtop. Leave ice pack in place for 20 minutes, then remove for 20 minutes. Repeat this 20 minutes on/20 minutes off routine as often as tolerated.     Medication Instructions - Acetaminophen (TYLENOL) Instructions   Order Comments: You were discharged with acetaminophen (TYLENOL) for pain management after surgery. Acetaminophen most effectively manages pain symptoms when it is taken on a schedule without missing doses (every four, six, or eight hours). Your Provider will prescribe a safe daily dose between 3000 - 4000 mg.  Do NOT exceed this daily dose. Most patients use acetaminophen for pain control for the first four weeks after surgery.  You can wean from this medication as your pain decreases.     Medication Instructions - NSAID Instructions   Order Comments: Do not use NSAIDs x 12 weeks. Some common anti-inflammatories include: ibuprofen (ADVIL, MOTRIN), naproxen (ALEVE, NAPROSYN), celecoxib (CELEBREX), meloxicam (MOBIC), ketorolac (TORADOL).     Medication Instructions - Muscle relaxant Medication Instructions   Order Comments: You were discharged with a muscle relaxer medication that can be used in conjunction with acetaminophen (TYLENOL) and the opioid medication to manage pain symptoms. Take the muscle relaxant medication exactly as directed.     Follow Up Care   Order Comments: Follow-up with your Surgeon Team in 6 weeks for wound check.     Medication instructions - No pharmacologic VTE prophylaxis prescribed   Order Comments: Your Surgeon did not prescribe medication for anticoagulation.     Opioid Instructions (Less than 65 years)   Order Comments: You were discharged with an opioid medication (hydromorphone, oxycodone,  "hydrocodone, or tramadol). This medication should only be taken for breakthrough pain that is not controlled with acetaminophen (TYLENOL). If you rate your pain less than 3 you do not need this medication. Pain rating 0-3: You do not need this medication. Pain rating 4-6: Take 1 tablet every 4-6 hours as needed Pain rating 7-10: Take 2 tablets every 4-6 hours as needed. Do not exceed 6 tablets per day     Medication Instructions - Opioids - Tapering Instructions   Order Comments: In the first three days following surgery, your symptoms may warrant use of the narcotic pain medication every four to six hours as prescribed. This is normal. As your pain symptoms improve, focus your efforts on decreasing (tapering) use of narcotic medications. The most successful tapering strategy is to first, decrease the number of tablets you take every 4-6 hours to the minimum prescribed. Then, increase the amount of time between doses. For example: First, taper to   or 1 tablet every 4-6 hours. Then, taper to   or 1 tablet every 6-8 hours. Then, taper to   or 1 tablet every 8-10 hours. Then, taper to   or 1 tablet every 10-12 hours. Then, taper to   or 1 tablet at bedtime. The bedtime dose can help with comfort during sleep and is typically the last dose to be discontinued after surgery.     Brief Discharge Instructions   Order Comments: Post-operative Discharge Instructions:     WOUND CARE:  You have a dressing on your incision which can be worn for up to 1 days, and it can be worn in the shower. After the dressing has been removed, you do not need a dressing. There is \"surgical glue\" directly over the incision. This will fall off on its own, which can take up to 2 weeks. It is okay to shower and wash gently with soap and water. Do not soak in the bath. No pools, hot tubs, or lakes for 6 weeks.      After surgery, you may have a sensitive scar.  When the incision has fully healed, you may massage the scar to decrease sensitivity and " help break down scar tissue. Do this up to 4 times per day.      DIET & EXERCISE:  - When you get home, you may resume your normal diet as tolerated. You may not be very hungry but try to eat small healthy meals to help you heal. Remember to drink plenty of water/fluids to help keep you hydrated.     - You will be seen in the clinic at 6 weeks following surgery. You will not need to attend physical therapy during this time. You can focus on cardiovascular fitness by walking as much as you can tolerate. Avoid bending, lifting, and twisting. Your weight lifting restriction is 10 pounds until your first follow-up appointment.       PAIN MEDICATIONS:  For postoperative pain control, we have prescribed a variety of medications. Tylenol has been prescribed and should be taken as part of the complete pain management regimen. You may also have been prescribed a muscle relaxer to be taken as needed for muscle spasms. Other pain management techniques include icing the surgical area (for 15 minutes on, 15 minutes off) throughout the day to help with inflammation. Avoid NSAIDs (ibuprofen, Aleve, Advil, etc) for the first 12 weeks after surgery, unless approved by your surgeon.     You also received a prescription for a opioid medication.  This should be taken for the first few weeks following surgery.  As pain improves, decrease the amount you take (see tapering plan below). Opioid have numerous side effects including nausea, constipation, and drowsiness. If you experience nausea, this may be relieved by taking the medication with food or a light meal. To avoid constipation, please use an over-the-counter stool softeners and drink lots of water and eat fruits and vegetables. No operating heavy machinery or driving an automobile while on opioid medications.        Tapering opioids: As your pain symptoms improve, focus your efforts on decreasing (tapering) use of opioid medications. The most successful tapering strategy is to  first, decrease the number of tablets you take every 4-6 hours to the minimum prescribed. Then, increase the amount of time between doses.  For example:  First, taper to   or 1 tablet every 4-6 hours.  Then, taper to   or 1 tablet every 6-8 hours.  Then, taper to   or 1 tablet every 8-10 hours.  Then, taper to   or 1 tablet every 10-12 hours.  Then, taper to   or 1 tablet at bedtime.  The bedtime dose can help with comfort during sleep and is typically the last dose to be discontinued after surgery.     Call the orthopedic clinic at [phone] several business days in advance of when you need a refill. Early refills will not be provided, and you may not take more than what is prescribed. Inform the nurse how much of the medication you are taking and how many tablets you have left.     WHEN TO CALL:  Please call or return if you experience the following:  - Fevers (temperature greater than 100.4 degrees Fahrenheit).  - Pain that is getting worse or does not respond to pain medications.  - Drainage from your wound.  - Increasing redness about the wound.  - Changes in strength or sensation to your arms/legs.   - Any other worrisome symptoms.     You may reach the clinic by dialing [phone].  After hours, you may reach the resident on call by dialing [phone].      Discharge Instruction - Regular Diet Adult   Order Comments: Return to your pre-surgery diet unless instructed otherwise     Order Specific Question Answer Comments   Is discharge order? Yes        Orthopedic surgery staff for this patient is Dr. Hernandez. Discussed.        --  Justyn Valladares MD  Spine Fellow

## 2024-09-12 ENCOUNTER — MYC MEDICAL ADVICE (OUTPATIENT)
Dept: ORTHOPEDICS | Facility: CLINIC | Age: 44
End: 2024-09-12
Payer: MEDICAID

## 2024-09-12 DIAGNOSIS — M41.26 OTHER IDIOPATHIC SCOLIOSIS, LUMBAR REGION: Primary | ICD-10-CM

## 2024-09-12 RX ORDER — HYDROMORPHONE HYDROCHLORIDE 2 MG/1
2-4 TABLET ORAL EVERY 4 HOURS PRN
Qty: 46 TABLET | Refills: 0 | Status: SHIPPED | OUTPATIENT
Start: 2024-09-12 | End: 2024-09-18

## 2024-09-12 NOTE — TELEPHONE ENCOUNTER
Patient currently taking 10 tab of Dilaudid. Signed additional tablets as the one she was sent yesterday was too low. Plan for Dilaudid 2-4mg q4hrs max of 10 tabs per day.    Bishop AGAPITO Ziegler PA-C

## 2024-09-30 ENCOUNTER — MYC MEDICAL ADVICE (OUTPATIENT)
Dept: ORTHOPEDICS | Facility: CLINIC | Age: 44
End: 2024-09-30
Payer: MEDICAID

## 2024-09-30 DIAGNOSIS — M41.26 OTHER IDIOPATHIC SCOLIOSIS, LUMBAR REGION: Primary | ICD-10-CM

## 2024-09-30 RX ORDER — HYDROMORPHONE HYDROCHLORIDE 2 MG/1
2 TABLET ORAL EVERY 12 HOURS PRN
Qty: 14 TABLET | Refills: 0 | Status: SHIPPED | OUTPATIENT
Start: 2024-09-30 | End: 2024-10-07

## 2024-09-30 NOTE — TELEPHONE ENCOUNTER
Pdmp reviewed. Called patient. She is only taking 2 tabs per day of Dilaudid. Signed 2mg q12hrs prn of Dilaudid for 7 days worth.    Bishop AGAPITO Ziegler PA-C

## 2024-10-06 ENCOUNTER — HEALTH MAINTENANCE LETTER (OUTPATIENT)
Age: 44
End: 2024-10-06

## 2024-10-11 DIAGNOSIS — Z98.1 S/P LUMBAR FUSION: Primary | ICD-10-CM

## 2024-10-15 ENCOUNTER — ANCILLARY PROCEDURE (OUTPATIENT)
Dept: GENERAL RADIOLOGY | Facility: CLINIC | Age: 44
End: 2024-10-15
Attending: ORTHOPAEDIC SURGERY
Payer: MEDICAID

## 2024-10-15 ENCOUNTER — OFFICE VISIT (OUTPATIENT)
Dept: ORTHOPEDICS | Facility: CLINIC | Age: 44
End: 2024-10-15
Payer: MEDICAID

## 2024-10-15 DIAGNOSIS — Z98.1 S/P LUMBAR FUSION: ICD-10-CM

## 2024-10-15 DIAGNOSIS — M41.125 ADOLESCENT IDIOPATHIC SCOLIOSIS OF THORACOLUMBAR REGION: Primary | ICD-10-CM

## 2024-10-15 DIAGNOSIS — M54.16 LUMBAR RADICULOPATHY: ICD-10-CM

## 2024-10-15 DIAGNOSIS — Z98.1 S/P FUSION OF THORACIC SPINE: ICD-10-CM

## 2024-10-15 PROCEDURE — 72110 X-RAY EXAM L-2 SPINE 4/>VWS: CPT | Performed by: STUDENT IN AN ORGANIZED HEALTH CARE EDUCATION/TRAINING PROGRAM

## 2024-10-15 PROCEDURE — 99024 POSTOP FOLLOW-UP VISIT: CPT | Performed by: ORTHOPAEDIC SURGERY

## 2024-10-15 RX ORDER — METHOCARBAMOL 500 MG/1
500 TABLET, FILM COATED ORAL 4 TIMES DAILY PRN
Qty: 60 TABLET | Refills: 1 | Status: SHIPPED | OUTPATIENT
Start: 2024-10-15

## 2024-10-15 NOTE — LETTER
10/15/2024      Khoa Cedeño  [address]  [facility] [address]      Dear Colleague,    Thank you for referring your patient, Khoa Cedeño, to the [facility] [place]. Please see a copy of my visit note below.    Spine Surgery Return Clinic Visit      Chief Complaint:   RECHECK (DOS 2024 Thoracic 10 to Lumbar 4 Instrumented Posterior Interbody Fusion and Nicole Woodson Osteotomy with O-Arm/Stealth Navigation, use of allograft, local autograft, and bone morphogenic protein)      Interval HPI:  Symptom Profile Including: location of symptoms, onset, severity, exacerbating/alleviating factors, previous treatments:        Khoa Cedeño is a 44 year old female who returns 6 weeks status post T10-L4 posterior fusion for scoliosis doing quite well.  Rates her pain as 2 out of 10 today.  Denies any radicular complaints.  Interested in increasing her activities.            Past Medical History:     Past Medical History:   Diagnosis Date     Adolescent idiopathic scoliosis of thoracolumbar region      Depression      PTSD (post-traumatic stress disorder)             Past Surgical History:     Past Surgical History:   Procedure Laterality Date     ABDOMINOPLASTY        SECTION      x5     OPTICAL TRACKING SYSTEM FUSION SPINE POSTERIOR LUMBAR THREE+ LEVELS N/A 2024    Procedure: Thoracic 10 to Lumbar 4 Instrumented Posterior Interbody Fusion and Nicole Woodson Osteotomy with O-Arm/Stealth Navigation, use of allograft, local autograft, and bone morphogenic protein;  Surgeon: Hansel Hernandez MD;  Location: [facility]     SALPINGECTOMY      during             Social History:     Social History     Tobacco Use     Smoking status: Former     Types: Cigarettes     Smokeless tobacco: Never   Substance Use Topics     Alcohol use: Yes     Comment: 2 glasses of wine twice a week            Family History:     Family History   Problem Relation Age of Onset      Anesthesia Reaction No family hx of      Deep Vein Thrombosis (DVT) No family hx of             Allergies:     Allergies   Allergen Reactions     Prochlorperazine Dizziness and Visual Disturbance     Loss of vision per patient             Medications:     Current Outpatient Medications   Medication Sig Dispense Refill     acetaminophen (TYLENOL) 325 MG tablet Take 2 tablets (650 mg) by mouth every 4 hours.       clindamycin (CLEOCIN T) 1 % SWAB Apply 1 applicator topically 2 times daily.       hydrOXYzine HCl (ATARAX) 25 MG tablet Take 1 tablet (25 mg) by mouth every 6 hours as needed for other (adjuvant pain). 50 tablet 0     lamoTRIgine (LAMICTAL) 200 MG tablet Take 200 mg by mouth daily.       methocarbamol (ROBAXIN) 500 MG tablet Take 1 tablet (500 mg) by mouth every 6 hours. 40 tablet 0     methocarbamol (ROBAXIN) 500 MG tablet Take 1 tablet (500 mg) by mouth 4 times daily as needed for muscle spasms. 90 tablet 1     minocycline (MINOCIN) 100 MG capsule Take 100 mg by mouth every morning       multivitamin w/minerals (MULTI-VITAMIN) tablet Take 1 tablet by mouth daily       polyethylene glycol (MIRALAX) 17 GM/Dose powder Take 17 g by mouth 2 times daily. 510 g 0     senna-docusate (SENOKOT-S/PERICOLACE) 8.6-50 MG tablet Take 2 tablets by mouth 2 times daily.       tamsulosin (FLOMAX) 0.4 MG capsule Take 1 capsule (0.4 mg) by mouth daily. 30 capsule 0     traZODone (DESYREL) 50 MG tablet Take 50 mg by mouth nightly as needed       tretinoin (RETIN-A) 0.05 % external cream Apply topically at bedtime       vitamin C with B complex (B COMPLEX-C) tablet Take 1 tablet by mouth daily       Vitamin D3 (CHOLECALCIFEROL) 25 mcg (1000 units) tablet Take 1 tablet (25 mcg) by mouth daily.       No current facility-administered medications for this visit.             Review of Systems:   A focused musculoskeletal and neurologic ROS was performed with pertinent positives and negatives noted in the HPI.  Additional systems  were also reviewed and are documented at the bottom of the note.         Physical Exam:   Vitals: There were no vitals taken for this visit.  Musculoskeletal, Neurologic, and Spine:            Lumbar Spine:    Appearance - No gross stepoffs or deformities    Motor -     L2-3: Hip flexion 5/5 R and 5/5 L strength          L3/4:  Knee extension R 5/5 and L 5/5 strength         L4/5:  Foot dorsiflexion R 5/5 L 5/5 and       EHL dorsiflexion R 4/5 L 4/5 strength         S1:  Plantarflexion/Peroneal Muscles  R 5/5 and L 5/5 strength    Sensation: intact to light touch L3-S1 distribution BLE      Neurologic:      REFLEXES Left Right                  Patella 1+ 1+   Ankle jerk 1+ 1+   Babinski No upgoing great toe No upgoing great toe   Clonus 0 beats 0 beats     Hip Exam:  No pain with hip log roll and no tenderness over the greater trochanters.    Alignment:  Patient stands with a neutral standing sagittal balance.           Imaging:   We ordered and independently reviewed new radiographs at this clinic visit. The results were discussed with the patient. Findings include:     We ordered and reviewed lumbar radiographs today which show no obvious implant complications, I compared this against her preoperative films which show significant improvement in alignment       Assessment and Plan:     44 year old female with good outcome at this time, may do unlimited low impact cardio, may do plain codes for core strengthening, I made a lumbar physical therapy referral for low impact cardio lower extremity strengthening and core strengthening avoid aggressive repetitive bending and twisting or lifting over about 20 or 30 pounds.  Follow-up in 3 months with physician assistant team member.  I agreed to refill her methocarbamol.           Respectfully,  Hansel Hernandez MD  Spine Surgery  [facility]    Again, thank you for allowing me to participate in the care of your patient.         Sincerely,        Hansel Hernandez MD

## 2024-10-15 NOTE — PROGRESS NOTES
Spine Surgery Return Clinic Visit      Chief Complaint:   RECHECK (DOS 2024 Thoracic 10 to Lumbar 4 Instrumented Posterior Interbody Fusion and Nicole Woodson Osteotomy with O-Arm/Stealth Navigation, use of allograft, local autograft, and bone morphogenic protein)      Interval HPI:  Symptom Profile Including: location of symptoms, onset, severity, exacerbating/alleviating factors, previous treatments:        Khoa Cedeño is a 44 year old female who returns 6 weeks status post T10-L4 posterior fusion for scoliosis doing quite well.  Rates her pain as 2 out of 10 today.  Denies any radicular complaints.  Interested in increasing her activities.            Past Medical History:     Past Medical History:   Diagnosis Date    Adolescent idiopathic scoliosis of thoracolumbar region     Depression     PTSD (post-traumatic stress disorder)             Past Surgical History:     Past Surgical History:   Procedure Laterality Date    ABDOMINOPLASTY       SECTION      x5    OPTICAL TRACKING SYSTEM FUSION SPINE POSTERIOR LUMBAR THREE+ LEVELS N/A 2024    Procedure: Thoracic 10 to Lumbar 4 Instrumented Posterior Interbody Fusion and Nicole Woodson Osteotomy with O-Arm/Stealth Navigation, use of allograft, local autograft, and bone morphogenic protein;  Surgeon: Hansel Hernandez MD;  [facility]: [facility]    SALPINGECTOMY      during             Social History:     Social History     Tobacco Use    Smoking status: Former     Types: Cigarettes    Smokeless tobacco: Never   Substance Use Topics    Alcohol use: Yes     Comment: 2 glasses of wine twice a week            Family History:     Family History   Problem Relation Age of Onset    Anesthesia Reaction No family hx of     Deep Vein Thrombosis (DVT) No family hx of             Allergies:     Allergies   Allergen Reactions    Prochlorperazine Dizziness and Visual Disturbance     Loss of vision per patient             Medications:     Current  Outpatient Medications   Medication Sig Dispense Refill    acetaminophen (TYLENOL) 325 MG tablet Take 2 tablets (650 mg) by mouth every 4 hours.      clindamycin (CLEOCIN T) 1 % SWAB Apply 1 applicator topically 2 times daily.      hydrOXYzine HCl (ATARAX) 25 MG tablet Take 1 tablet (25 mg) by mouth every 6 hours as needed for other (adjuvant pain). 50 tablet 0    lamoTRIgine (LAMICTAL) 200 MG tablet Take 200 mg by mouth daily.      methocarbamol (ROBAXIN) 500 MG tablet Take 1 tablet (500 mg) by mouth every 6 hours. 40 tablet 0    methocarbamol (ROBAXIN) 500 MG tablet Take 1 tablet (500 mg) by mouth 4 times daily as needed for muscle spasms. 90 tablet 1    minocycline (MINOCIN) 100 MG capsule Take 100 mg by mouth every morning      multivitamin w/minerals (MULTI-VITAMIN) tablet Take 1 tablet by mouth daily      polyethylene glycol (MIRALAX) 17 GM/Dose powder Take 17 g by mouth 2 times daily. 510 g 0    senna-docusate (SENOKOT-S/PERICOLACE) 8.6-50 MG tablet Take 2 tablets by mouth 2 times daily.      tamsulosin (FLOMAX) 0.4 MG capsule Take 1 capsule (0.4 mg) by mouth daily. 30 capsule 0    traZODone (DESYREL) 50 MG tablet Take 50 mg by mouth nightly as needed      tretinoin (RETIN-A) 0.05 % external cream Apply topically at bedtime      vitamin C with B complex (B COMPLEX-C) tablet Take 1 tablet by mouth daily      Vitamin D3 (CHOLECALCIFEROL) 25 mcg (1000 units) tablet Take 1 tablet (25 mcg) by mouth daily.       No current facility-administered medications for this visit.             Review of Systems:   A focused musculoskeletal and neurologic ROS was performed with pertinent positives and negatives noted in the HPI.  Additional systems were also reviewed and are documented at the bottom of the note.         Physical Exam:   Vitals: There were no vitals taken for this visit.  Musculoskeletal, Neurologic, and Spine:            Lumbar Spine:    Appearance - No gross stepoffs or deformities    Motor -     L2-3: Hip  flexion 5/5 R and 5/5 L strength          L3/4:  Knee extension R 5/5 and L 5/5 strength         L4/5:  Foot dorsiflexion R 5/5 L 5/5 and       EHL dorsiflexion R 4/5 L 4/5 strength         S1:  Plantarflexion/Peroneal Muscles  R 5/5 and L 5/5 strength    Sensation: intact to light touch L3-S1 distribution BLE      Neurologic:      REFLEXES Left Right                  Patella 1+ 1+   Ankle jerk 1+ 1+   Babinski No upgoing great toe No upgoing great toe   Clonus 0 beats 0 beats     Hip Exam:  No pain with hip log roll and no tenderness over the greater trochanters.    Alignment:  Patient stands with a neutral standing sagittal balance.           Imaging:   We ordered and independently reviewed new radiographs at this clinic visit. The results were discussed with the patient. Findings include:     We ordered and reviewed lumbar radiographs today which show no obvious implant complications, I compared this against her preoperative films which show significant improvement in alignment       Assessment and Plan:     44 year old female with good outcome at this time, may do unlimited low impact cardio, may do plain codes for core strengthening, I made a lumbar physical therapy referral for low impact cardio lower extremity strengthening and core strengthening avoid aggressive repetitive bending and twisting or lifting over about 20 or 30 pounds.  Follow-up in 3 months with physician assistant team member.  I agreed to refill her methocarbamol.           Respectfully,  Hansel Hernandez MD  Spine Surgery  [facility]

## 2024-10-15 NOTE — NURSING NOTE
Reason For Visit:   Chief Complaint   Patient presents with    RECHECK     DOS 8/30/2024 Thoracic 10 to Lumbar 4 Instrumented Posterior Interbody Fusion and Nicole Woodson Osteotomy with O-Arm/Stealth Navigation, use of allograft, local autograft, and bone morphogenic protein       Primary MD: Patt Noble  Ref. MD: Est   Date of surgery: 8/30/2024    Type of surgery: Thoracic 10 to Lumbar 4 Instrumented Posterior Interbody Fusion and Nicole Woodson Osteotomy with O-Arm/Stealth Navigation, use of allograft, local autograft, and bone morphogenic protein     There were no vitals taken for this visit.    Pain Assessment  Patient Currently in Pain: Yes  Patient's Stated Pain Goal: 2  0-10 Pain Scale: 2  Primary Pain Location: Back    Oswestry (FRED) Questionnaire        7/2/2024    11:06 AM   OSWESTRY DISABILITY INDEX   Count 10   Sum 9   Oswestry Score (%) 18 %            Neck Disability Index (NDI) Questionnaire         No data to display                       Visual Analog Pain Scale  Back Pain Scale 0-10: 2  Right leg pain: 0  Left leg pain: 0  Neck Pain Scale 0-10: 0  Right arm pain: 0  Left arm pain: 0    Promis 10 Assessment         No data to display                         Mae Cardenas CMA

## 2024-10-16 RX ORDER — METHOCARBAMOL 500 MG/1
500 TABLET, FILM COATED ORAL 4 TIMES DAILY PRN
Qty: 60 TABLET | Refills: 1 | OUTPATIENT
Start: 2024-10-16

## 2024-12-10 ENCOUNTER — MEDICAL CORRESPONDENCE (OUTPATIENT)
Dept: HEALTH INFORMATION MANAGEMENT | Facility: CLINIC | Age: 44
End: 2024-12-10
Payer: MEDICAID

## 2024-12-17 ENCOUNTER — OFFICE VISIT (OUTPATIENT)
Dept: ORTHOPEDICS | Facility: CLINIC | Age: 44
End: 2024-12-17
Payer: MEDICAID

## 2024-12-17 ENCOUNTER — ANCILLARY PROCEDURE (OUTPATIENT)
Dept: GENERAL RADIOLOGY | Facility: CLINIC | Age: 44
End: 2024-12-17
Attending: ORTHOPAEDIC SURGERY
Payer: MEDICAID

## 2024-12-17 DIAGNOSIS — M41.26 OTHER IDIOPATHIC SCOLIOSIS, LUMBAR REGION: ICD-10-CM

## 2024-12-17 DIAGNOSIS — M41.26 OTHER IDIOPATHIC SCOLIOSIS, LUMBAR REGION: Primary | ICD-10-CM

## 2024-12-17 PROCEDURE — 72082 X-RAY EXAM ENTIRE SPI 2/3 VW: CPT | Performed by: STUDENT IN AN ORGANIZED HEALTH CARE EDUCATION/TRAINING PROGRAM

## 2024-12-17 PROCEDURE — 77073 BONE LENGTH STUDIES: CPT | Performed by: STUDENT IN AN ORGANIZED HEALTH CARE EDUCATION/TRAINING PROGRAM

## 2024-12-17 PROCEDURE — 99214 OFFICE O/P EST MOD 30 MIN: CPT | Performed by: ORTHOPAEDIC SURGERY

## 2024-12-17 RX ORDER — BUPROPION HYDROCHLORIDE 150 MG/1
TABLET ORAL
COMMUNITY
Start: 2024-11-18

## 2024-12-17 NOTE — PROGRESS NOTES
Spine Surgery Return Clinic Visit      Chief Complaint:   RECHECK (Numbness in lower back )      Interval HPI:  Symptom Profile Including: location of symptoms, onset, severity, exacerbating/alleviating factors, previous treatments:        Khoa Cedeño is a 44 year old female who returns in follow-up.  She has been dealing with a feeling of numbness and burning along the musculature above her buttocks and lower back on both sides somewhat worse on the right side.  Denies any radicular leg symptoms.  Overall her back pain now is less than it was before the surgery.  She has not been able to start physical therapy yet but she has a starting next week.  She is not currently taking any medications.            Past Medical History:     Past Medical History:   Diagnosis Date    Adolescent idiopathic scoliosis of thoracolumbar region     Depression     PTSD (post-traumatic stress disorder)             Past Surgical History:     Past Surgical History:   Procedure Laterality Date    ABDOMINOPLASTY       SECTION      x5    OPTICAL TRACKING SYSTEM FUSION SPINE POSTERIOR LUMBAR THREE+ LEVELS N/A 2024    Procedure: Thoracic 10 to Lumbar 4 Instrumented Posterior Interbody Fusion and Nicole Woodson Osteotomy with O-Arm/Stealth Navigation, use of allograft, local autograft, and bone morphogenic protein;  Surgeon: Hansel Hernandez MD;  Location: [facility] OR    SALPINGECTOMY      during             Social History:     Social History     Tobacco Use    Smoking status: Former     Types: Cigarettes    Smokeless tobacco: Never   Substance Use Topics    Alcohol use: Yes     Comment: 2 glasses of wine twice a week            Family History:     Family History   Problem Relation Age of Onset    Anesthesia Reaction No family hx of     Deep Vein Thrombosis (DVT) No family hx of             Allergies:     Allergies   Allergen Reactions    Prochlorperazine Dizziness and Visual Disturbance     Loss of vision per  patient             Medications:     Current Outpatient Medications   Medication Sig Dispense Refill    buPROPion (WELLBUTRIN XL) 150 MG 24 hr tablet       acetaminophen (TYLENOL) 325 MG tablet Take 2 tablets (650 mg) by mouth every 4 hours.      clindamycin (CLEOCIN T) 1 % SWAB Apply 1 applicator topically 2 times daily.      hydrOXYzine HCl (ATARAX) 25 MG tablet Take 1 tablet (25 mg) by mouth every 6 hours as needed for other (adjuvant pain). 50 tablet 0    lamoTRIgine (LAMICTAL) 200 MG tablet Take 200 mg by mouth daily.      methocarbamol (ROBAXIN) 500 MG tablet Take 1 tablet (500 mg) by mouth 4 times daily as needed for muscle spasms. 60 tablet 1    methocarbamol (ROBAXIN) 500 MG tablet Take 1 tablet (500 mg) by mouth every 6 hours. 40 tablet 0    minocycline (MINOCIN) 100 MG capsule Take 100 mg by mouth every morning      multivitamin w/minerals (MULTI-VITAMIN) tablet Take 1 tablet by mouth daily      polyethylene glycol (MIRALAX) 17 GM/Dose powder Take 17 g by mouth 2 times daily. 510 g 0    senna-docusate (SENOKOT-S/PERICOLACE) 8.6-50 MG tablet Take 2 tablets by mouth 2 times daily.      tamsulosin (FLOMAX) 0.4 MG capsule Take 1 capsule (0.4 mg) by mouth daily. 30 capsule 0    traZODone (DESYREL) 50 MG tablet Take 50 mg by mouth nightly as needed      tretinoin (RETIN-A) 0.05 % external cream Apply topically at bedtime      vitamin C with B complex (B COMPLEX-C) tablet Take 1 tablet by mouth daily      Vitamin D3 (CHOLECALCIFEROL) 25 mcg (1000 units) tablet Take 1 tablet (25 mcg) by mouth daily.       No current facility-administered medications for this visit.             Review of Systems:   A focused musculoskeletal and neurologic ROS was performed with pertinent positives and negatives noted in the HPI.  Additional systems were also reviewed and are documented at the bottom of the note.         Physical Exam:   Vitals: There were no vitals taken for this visit.  Musculoskeletal, Neurologic, and Spine:           Lumbar Spine:    Appearance - No gross stepoffs or deformities    Motor -     L2-3: Hip flexion 5/5 R and 5/5 L strength          L3/4:  Knee extension R 5/5 and L 5/5 strength         L4/5:  Foot dorsiflexion R 5/5 L 5/5 and                S1:  Plantarflexion/Peroneal Muscles  R 5/5 and L 5/5 strength    Sensation: intact to light touch L3-S1 distribution BLE          Hip Exam:  No pain with hip log roll and no tenderness over the greater trochanters.    Alignment:  Patient stands with a neutral standing sagittal balance.          Imaging:   We ordered and independently reviewed new radiographs at this clinic visit. The results were discussed with the patient. Findings include:     Scoliosis radiographs today show grossly unchanged alignment compared to previous films from her immediate postoperative visit       Assessment and Plan:     44 year old female with low back pains, we recommended starting gabapentin and getting going with her physical therapy.  If things do not improve we could consider an MRI to obtain more detail, but right now I have a low suspicion for any stenosis or radicular complaints.  She has follow-up with her physician assistants in the early part of 2025 and should maintain that follow-up or let me know sooner if symptoms worsen.  I did send a prescription for gabapentin to her pharmacy.           Respectfully,  Hansel Hernandez MD  Spine Surgery  [facility]

## 2024-12-17 NOTE — NURSING NOTE
Reason For Visit:   Chief Complaint   Patient presents with    RECHECK     Numbness in lower back        Primary MD: Patt Noble  Ref. MD: Rakel       Date of surgery: 8/30/2024    Type of surgery: Thoracic 10 to Lumbar 4 Instrumented Posterior Interbody Fusion and Nicole Woodson Osteotomy with O-Arm/Stealth Navigation, use of allograft, local autograft, and bone morphogenic protein; .      There were no vitals taken for this visit.    Pain Assessment  Patient Currently in Pain: Denies (Numbness)    Oswestry (FRED) Questionnaire        7/2/2024    11:06 AM   OSWESTRY DISABILITY INDEX   Count 10   Sum 9   Oswestry Score (%) 18 %            Neck Disability Index (NDI) Questionnaire         No data to display                            Promis 10 Assessment         No data to display                         Mae Cardenas CMA

## 2024-12-18 RX ORDER — GABAPENTIN 300 MG/1
CAPSULE ORAL
Qty: 90 CAPSULE | Refills: 1 | Status: SHIPPED | OUTPATIENT
Start: 2024-12-18 | End: 2025-01-01

## 2024-12-23 ENCOUNTER — TRANSCRIBE ORDERS (OUTPATIENT)
Dept: OTHER | Age: 44
End: 2024-12-23

## 2024-12-23 DIAGNOSIS — M41.125 ADOLESCENT IDIOPATHIC SCOLIOSIS OF THORACOLUMBAR REGION: ICD-10-CM

## 2024-12-23 DIAGNOSIS — M41.25 OTHER IDIOPATHIC SCOLIOSIS, THORACOLUMBAR REGION: ICD-10-CM

## 2024-12-23 DIAGNOSIS — Z98.1 S/P FUSION OF THORACIC SPINE: ICD-10-CM

## 2024-12-23 DIAGNOSIS — M41.20 OTHER IDIOPATHIC SCOLIOSIS, UNSPECIFIED SPINAL REGION: Primary | ICD-10-CM

## 2024-12-24 ENCOUNTER — TELEPHONE (OUTPATIENT)
Dept: OTHER | Age: 44
End: 2024-12-24

## 2024-12-24 NOTE — TELEPHONE ENCOUNTER
Other: pt has a new referral for spine to see  Marielena Young.  Says is for post op.  Ortho con does not schedule for post op.  Does pt need an appt sooner than the February one scheduled, is this different.  Please review.     Could we send this information to you in [employer] or would you prefer to receive a phone call?:   Patient would prefer a phone call   Okay to leave a detailed message?: No at Cell number on file:    Telephone Information:   Mobile [phone]   Mobile [phone]

## 2024-12-26 ENCOUNTER — MYC MEDICAL ADVICE (OUTPATIENT)
Dept: ORTHOPEDICS | Facility: CLINIC | Age: 44
End: 2024-12-26
Payer: MEDICAID

## 2024-12-26 NOTE — TELEPHONE ENCOUNTER
Sent [employer] (1st Attempt) for the patient to call back and schedule the following:    Appointment type: return surgical spine  Provider: HOANG Colon  Visit mode: In Person  Return date: Approx. Early January available  Specialty phone number: [phone]    Additional Notes:

## 2024-12-30 NOTE — TELEPHONE ENCOUNTER
Spoke with patient to schedule an appointment with a spine PA. Patient explained they just saw Dr. Hernandez and scheduled their follow-up with Marielena Young already on 2/18/25. Patient declined to schedule another appointment.

## 2025-02-05 DIAGNOSIS — M41.26 OTHER IDIOPATHIC SCOLIOSIS, LUMBAR REGION: Primary | ICD-10-CM

## 2025-02-05 DIAGNOSIS — M54.16 LUMBAR RADICULOPATHY: ICD-10-CM

## 2025-02-05 DIAGNOSIS — Z98.1 S/P FUSION OF THORACIC SPINE: ICD-10-CM

## 2025-02-05 DIAGNOSIS — Z98.1 S/P LUMBAR FUSION: ICD-10-CM

## 2025-02-18 ENCOUNTER — OFFICE VISIT (OUTPATIENT)
Dept: ORTHOPEDICS | Facility: CLINIC | Age: 45
End: 2025-02-18
Attending: ORTHOPAEDIC SURGERY
Payer: MEDICAID

## 2025-02-18 ENCOUNTER — ANCILLARY PROCEDURE (OUTPATIENT)
Dept: GENERAL RADIOLOGY | Facility: CLINIC | Age: 45
End: 2025-02-18
Attending: PHYSICIAN ASSISTANT
Payer: MEDICAID

## 2025-02-18 DIAGNOSIS — M54.16 LUMBAR RADICULOPATHY: ICD-10-CM

## 2025-02-18 DIAGNOSIS — M41.125 ADOLESCENT IDIOPATHIC SCOLIOSIS OF THORACOLUMBAR REGION: ICD-10-CM

## 2025-02-18 DIAGNOSIS — Z98.1 S/P LUMBAR FUSION: ICD-10-CM

## 2025-02-18 DIAGNOSIS — M41.20 OTHER IDIOPATHIC SCOLIOSIS, UNSPECIFIED SPINAL REGION: ICD-10-CM

## 2025-02-18 DIAGNOSIS — Z98.1 S/P FUSION OF THORACIC SPINE: Primary | ICD-10-CM

## 2025-02-18 DIAGNOSIS — M41.25 OTHER IDIOPATHIC SCOLIOSIS, THORACOLUMBAR REGION: ICD-10-CM

## 2025-02-18 DIAGNOSIS — Z98.1 S/P FUSION OF THORACIC SPINE: ICD-10-CM

## 2025-02-18 DIAGNOSIS — M41.26 OTHER IDIOPATHIC SCOLIOSIS, LUMBAR REGION: ICD-10-CM

## 2025-02-18 PROCEDURE — 77073 BONE LENGTH STUDIES: CPT | Performed by: STUDENT IN AN ORGANIZED HEALTH CARE EDUCATION/TRAINING PROGRAM

## 2025-02-18 PROCEDURE — 99213 OFFICE O/P EST LOW 20 MIN: CPT | Performed by: PHYSICIAN ASSISTANT

## 2025-02-18 PROCEDURE — 72082 X-RAY EXAM ENTIRE SPI 2/3 VW: CPT | Performed by: STUDENT IN AN ORGANIZED HEALTH CARE EDUCATION/TRAINING PROGRAM

## 2025-02-18 NOTE — PROGRESS NOTES
I have reviewed and updated the patient's Past Medical History, Social History, Family History and Medication List.    ALLERGIES  Prochlorperazine    Spine Surgery Follow Up    REFERRING PHYSICIAN: Hansel Nixon*   PRIMARY CARE PHYSICIAN: Patt Noble           Chief Complaint:   RECHECK (3 month follow up. S/p lumbar fusion DOS -  8/30/2024)      History of Present Illness:  Symptom Profile Including: location of symptoms, onset, severity, exacerbating/alleviating factors, previous treatments:        Khoa Cedeño is a 44 year old female who presents today for routine 6 months postop s/p T10-L4 PISF (DOS 8/30/2024 with Dr. Hernandez).  Last seen in clinic with Dr. Hernandez 12/17/2024 at which time she was having new onset burning/numbness bilateral paraspinals.  Plan was to start physical therapy, gabapentin, consider MRI if no improvement.    Today, she reports that the symptoms she was having at the last visit have resolved.  She has no other concerns or complaints today.  Is working with physical therapy.  Interested in activity progression.    FRED Scores:  Oswestry (FRED) Questionnaire        2/18/2025    11:17 AM   OSWESTRY DISABILITY INDEX   Count 10    Sum 13    Oswestry Score (%) 26 %        Patient-reported      Visual Analog Pain Scale  Back Pain Scale 0-10: 5  Right leg pain: 0  Left leg pain: 0  Neck Pain Scale 0-10: 0  Right arm pain: 0  Left arm pain: 0    PROMIS-10 Scores:  Global Mental Health Score: (Patient-Rptd) (P) 8  Global Physical Health Score: (Patient-Rptd) (P) 12  PROMIS TOTAL - SUBSCORES: (Patient-Rptd) (P) 20         Physical Exam:    Constitutional - Patient is healthy, well-nourished and appears stated age   Respiratory - Patient is breathing normally and in no respiratory distress.   Skin - No suspicious rashes or lesions.   Psychiatric - Normal mood and affect.   Cardiovascular - Extremities warm and well perfused.   Eyes - Visual acuity is normal to the written  word.   ENT - Hearing intact to the spoken word.   GI - No abdominal distention.   Musculoskeletal - Non-antalgic gait without use of assistive devices.  Well-healed midline surgical incision.  5/5 bilateral hip flexion, knee extension, dorsiflexion, plantarflexion strength           Imaging:   I ordered and independently reviewed new radiographs at this clinic visit. The results were discussed with the patient.  Findings include:    2/18/2025 EOS full body standing AP/lateral view x-rays: Stable appearance of T10-L4 instrumentation without evidence of hardware failure or fracture.  Stable appearance of residual upper thoracic curvature.  No evidence of adjacent segment disease.             Assessment and Plan:   Assessment:  44 year old female 6 months s/p T10-L4 PISF for adolescent idiopathic scoliosis (DOS 8/30/2024 with Dr. Hernandez), progressing as expected     Plan:  Reviewed today's updated XR images with patient which demonstrate stable instrumentation.  Fortunately the symptoms she was having at the last visit have resolved, but if they were to return, we can consider advanced imaging.  Okay for her to continue to gradually progress activities, avoid high impact activities.  Next scheduled follow-up would be at 1 year postop with CT thoracic/lumbar to assess fusion, or sooner if new symptoms arise.    - Follow up in 6 months (1 year postop) with CT prior to assess fusion.    Respectfully,  Marielena Young (see [place]), TAIWO  Orthopaedic Spine Surgery  Dept Orthopaedic Surgery, [facility]  [facility] Phone: [phone]    20 minutes spent on the date of the encounter doing chart review, review of outside records, review of test results, my own interpretation of tests, documentation, patient history, physical exam & discussion of plan with patient and family.    Dictation Disclaimer: Some of this Note has been completed with voice-recognition dictation software. Although errors are generally corrected  real-time, there is the potential for a rare error to be present in the completed chart.

## 2025-02-18 NOTE — LETTER
2/18/2025      Khoa Cedeño  [address]  [place] [address]      Dear Colleague,    Thank you for referring your patient, Khoa Cedeño, to the [facility] [place]. Please see a copy of my visit note below.    I have reviewed and updated the patient's Past Medical History, Social History, Family History and Medication List.    ALLERGIES  Prochlorperazine    Spine Surgery Follow Up    REFERRING PHYSICIAN: Hansel Nixon*   PRIMARY CARE PHYSICIAN: Patt Noble           Chief Complaint:   RECHECK (3 month follow up. S/p lumbar fusion DOS -  8/30/2024)      History of Present Illness:  Symptom Profile Including: location of symptoms, onset, severity, exacerbating/alleviating factors, previous treatments:        Khoa Cedeño is a 44 year old female who presents today for routine 6 months postop s/p T10-L4 PISF (DOS 8/30/2024 with Dr. Hernandez).  Last seen in clinic with Dr. Hernandez 12/17/2024 at which time she was having new onset burning/numbness bilateral paraspinals.  Plan was to start physical therapy, gabapentin, consider MRI if no improvement.    Today, she reports that the symptoms she was having at the last visit have resolved.  She has no other concerns or complaints today.  Is working with physical therapy.  Interested in activity progression.    FRED Scores:  Oswestry (FRED) Questionnaire        2/18/2025    11:17 AM   OSWESTRY DISABILITY INDEX   Count 10    Sum 13    Oswestry Score (%) 26 %        Patient-reported      Visual Analog Pain Scale  Back Pain Scale 0-10: 5  Right leg pain: 0  Left leg pain: 0  Neck Pain Scale 0-10: 0  Right arm pain: 0  Left arm pain: 0    PROMIS-10 Scores:  Global Mental Health Score: (Patient-Rptd) (P) 8  Global Physical Health Score: (Patient-Rptd) (P) 12  PROMIS TOTAL - SUBSCORES: (Patient-Rptd) (P) 20         Physical Exam:    Constitutional - Patient is healthy, well-nourished and appears stated age   Respiratory -  Patient is breathing normally and in no respiratory distress.   Skin - No suspicious rashes or lesions.   Psychiatric - Normal mood and affect.   Cardiovascular - Extremities warm and well perfused.   Eyes - Visual acuity is normal to the written word.   ENT - Hearing intact to the spoken word.   GI - No abdominal distention.   Musculoskeletal - Non-antalgic gait without use of assistive devices.  Well-healed midline surgical incision.  5/5 bilateral hip flexion, knee extension, dorsiflexion, plantarflexion strength           Imaging:   I ordered and independently reviewed new radiographs at this clinic visit. The results were discussed with the patient.  Findings include:    2/18/2025 EOS full body standing AP/lateral view x-rays: Stable appearance of T10-L4 instrumentation without evidence of hardware failure or fracture.  Stable appearance of residual upper thoracic curvature.  No evidence of adjacent segment disease.             Assessment and Plan:   Assessment:  44 year old female 6 months s/p T10-L4 PISF for adolescent idiopathic scoliosis (DOS 8/30/2024 with Dr. Hernandez), progressing as expected     Plan:  Reviewed today's updated XR images with patient which demonstrate stable instrumentation.  Fortunately the symptoms she was having at the last visit have resolved, but if they were to return, we can consider advanced imaging.  Okay for her to continue to gradually progress activities, avoid high impact activities.  Next scheduled follow-up would be at 1 year postop with CT thoracic/lumbar to assess fusion, or sooner if new symptoms arise.    - Follow up in 6 months (1 year postop) with CT prior to assess fusion.    Respectfully,  Marielena Young (a.k.a. Billing), PANORBERTO  Orthopaedic Spine Surgery  Dept Orthopaedic Surgery, [facility]  [facility] [place] [phone]    20 minutes spent on the date of the encounter doing chart review, review of outside records, review of test results, my own interpretation of  tests, documentation, patient history, physical exam & discussion of plan with patient and family.    Dictation Disclaimer: Some of this Note has been completed with voice-recognition dictation software. Although errors are generally corrected real-time, there is the potential for a rare error to be present in the completed chart.      Again, thank you for allowing me to participate in the care of your patient.        Sincerely,        Marielena Young PA-C    Electronically signed

## 2025-02-18 NOTE — NURSING NOTE
Reason For Visit:   Chief Complaint   Patient presents with    RECHECK     3 month follow up. S/p lumbar fusion DOS -  8/30/2024       Primary MD: Patt Noble  Ref. MD: Rakel     Date of surgery: 8/30/2024    Type of surgery: Thoracic 10 to Lumbar 4 Instrumented Posterior Interbody Fusion and Nicole Woodson Osteotomy with O-Arm/Stealth Navigation, use of allograft, local autograft, and bone morphogenic protein .      There were no vitals taken for this visit.    Pain Assessment  Patient Currently in Pain: Yes  Patient's Stated Pain Goal: 5  0-10 Pain Scale: 5  Primary Pain Location: Back    Oswestry (FRED) Questionnaire        2/18/2025    11:17 AM   OSWESTRY DISABILITY INDEX   Count 10    Sum 13    Oswestry Score (%) 26 %        Patient-reported            Neck Disability Index (NDI) Questionnaire         No data to display                       Visual Analog Pain Scale  Back Pain Scale 0-10: 5  Right leg pain: 0  Left leg pain: 0  Neck Pain Scale 0-10: 0  Right arm pain: 0  Left arm pain: 0    Promis 10 Assessment        2/18/2025    11:19 AM   PROMIS 10   In general, would you say your health is: Good   In general, would you say your quality of life is: Good   In general, how would you rate your physical health? Good   In general, how would you rate your mental health, including your mood and your ability to think? Fair   In general, how would you rate your satisfaction with your social activities and relationships? Poor   In general, please rate how well you carry out your usual social activities and roles Good   To what extent are you able to carry out your everyday physical activities such as walking, climbing stairs, carrying groceries, or moving a chair? Moderately   In the past 7 days, how often have you been bothered by emotional problems such as feeling anxious, depressed, or irritable? Often   In the past 7 days, how would you rate your fatigue on average? Moderate   In the past 7 days, how would  you rate your pain on average, where 0 means no pain, and 10 means worst imaginable pain? 4   In general, would you say your health is: 3   In general, would you say your quality of life is: 3   In general, how would you rate your physical health? 3   In general, how would you rate your mental health, including your mood and your ability to think? 2   In general, how would you rate your satisfaction with your social activities and relationships? 1   In general, please rate how well you carry out your usual social activities and roles. (This includes activities at home, at work and in your community, and responsibilities as a parent, child, spouse, employee, friend, etc.) 3   To what extent are you able to carry out your everyday physical activities such as walking, climbing stairs, carrying groceries, or moving a chair? 3   In the past 7 days, how often have you been bothered by emotional problems such as feeling anxious, depressed, or irritable? 4   In the past 7 days, how would you rate your fatigue on average? 3   In the past 7 days, how would you rate your pain on average, where 0 means no pain, and 10 means worst imaginable pain? 4   Global Mental Health Score 8    Global Physical Health Score 12    PROMIS TOTAL - SUBSCORES 20        Patient-reported                Mae Cardenas CMA

## 2025-02-27 ENCOUNTER — MYC MEDICAL ADVICE (OUTPATIENT)
Dept: ORTHOPEDICS | Facility: CLINIC | Age: 45
End: 2025-02-27
Payer: MEDICAID

## 2025-03-18 DIAGNOSIS — M41.26 OTHER IDIOPATHIC SCOLIOSIS, LUMBAR REGION: ICD-10-CM

## 2025-03-18 RX ORDER — GABAPENTIN 300 MG/1
300 CAPSULE ORAL 3 TIMES DAILY
Qty: 90 CAPSULE | Refills: 1 | Status: SHIPPED | OUTPATIENT
Start: 2025-03-18

## 2025-05-12 DIAGNOSIS — Z98.1 S/P LUMBAR FUSION: ICD-10-CM

## 2025-05-12 DIAGNOSIS — Z98.1 S/P FUSION OF THORACIC SPINE: Primary | ICD-10-CM

## 2025-05-13 ENCOUNTER — OFFICE VISIT (OUTPATIENT)
Dept: ORTHOPEDICS | Facility: CLINIC | Age: 45
End: 2025-05-13
Payer: MEDICAID

## 2025-05-13 ENCOUNTER — ANCILLARY PROCEDURE (OUTPATIENT)
Dept: GENERAL RADIOLOGY | Facility: CLINIC | Age: 45
End: 2025-05-13
Attending: ORTHOPAEDIC SURGERY
Payer: MEDICAID

## 2025-05-13 DIAGNOSIS — Z98.1 S/P LUMBAR FUSION: ICD-10-CM

## 2025-05-13 DIAGNOSIS — Z98.1 S/P FUSION OF THORACIC SPINE: Primary | ICD-10-CM

## 2025-05-13 DIAGNOSIS — Z98.1 S/P FUSION OF THORACIC SPINE: ICD-10-CM

## 2025-05-13 PROCEDURE — 99214 OFFICE O/P EST MOD 30 MIN: CPT | Performed by: ORTHOPAEDIC SURGERY

## 2025-05-13 NOTE — LETTER
2025      Khoa Cedeño  [address]  [facility] [address]      Dear Colleague,    Thank you for referring your patient, Khoa Cedeño, to the [facility] [place]. Please see a copy of my visit note below.    Spine Surgery Return Clinic Visit      Chief Complaint:   RECHECK (Follow up fusion of thoracic spine. )      Interval HPI:  Symptom Profile Including: location of symptoms, onset, severity, exacerbating/alleviating factors, previous treatments:        Khoa Cedeño is a 44 year old female who returns in follow up.  She is having pain in the mid thoracic region.  Feels like the ribs are heavy and at times it is difficult to breathe.  Denies radicular leg symptoms.  Has done PT and NSAIds in the past.  Worse when active and improved with rest.  Sometimes bothers sleep.              Past Medical History:     Past Medical History:   Diagnosis Date     Adolescent idiopathic scoliosis of thoracolumbar region      Depression      PTSD (post-traumatic stress disorder)             Past Surgical History:     Past Surgical History:   Procedure Laterality Date     ABDOMINOPLASTY        SECTION      x5     OPTICAL TRACKING SYSTEM FUSION SPINE POSTERIOR LUMBAR THREE+ LEVELS N/A 2024    Procedure: Thoracic 10 to Lumbar 4 Instrumented Posterior Interbody Fusion and Nicole Owodson Osteotomy with O-Arm/Stealth Navigation, use of allograft, local autograft, and bone morphogenic protein;  Surgeon: Hansel Hernandez MD;  Location: [facility] OR     SALPINGECTOMY      during             Social History:     Social History     Tobacco Use     Smoking status: Former     Types: Cigarettes     Smokeless tobacco: Never   Substance Use Topics     Alcohol use: Yes     Comment: 2 glasses of wine twice a week            Family History:     Family History   Problem Relation Age of Onset     Anesthesia Reaction No family hx of      Deep Vein Thrombosis (DVT) No family hx of              Allergies:     Allergies   Allergen Reactions     Prochlorperazine Dizziness and Visual Disturbance     Loss of vision per patient             Medications:     Current Outpatient Medications   Medication Sig Dispense Refill     acetaminophen (TYLENOL) 325 MG tablet Take 2 tablets (650 mg) by mouth every 4 hours.       buPROPion (WELLBUTRIN XL) 150 MG 24 hr tablet        clindamycin (CLEOCIN T) 1 % SWAB Apply 1 applicator topically 2 times daily.       gabapentin (NEURONTIN) 300 MG capsule Take 1 capsule (300 mg) by mouth 3 times daily. 90 capsule 1     hydrOXYzine HCl (ATARAX) 25 MG tablet Take 1 tablet (25 mg) by mouth every 6 hours as needed for other (adjuvant pain). 50 tablet 0     lamoTRIgine (LAMICTAL) 200 MG tablet Take 200 mg by mouth daily.       methocarbamol (ROBAXIN) 500 MG tablet Take 1 tablet (500 mg) by mouth 4 times daily as needed for muscle spasms. 60 tablet 1     methocarbamol (ROBAXIN) 500 MG tablet Take 1 tablet (500 mg) by mouth every 6 hours. 40 tablet 0     minocycline (MINOCIN) 100 MG capsule Take 100 mg by mouth every morning       multivitamin w/minerals (MULTI-VITAMIN) tablet Take 1 tablet by mouth daily       polyethylene glycol (MIRALAX) 17 GM/Dose powder Take 17 g by mouth 2 times daily. 510 g 0     senna-docusate (SENOKOT-S/PERICOLACE) 8.6-50 MG tablet Take 2 tablets by mouth 2 times daily.       tamsulosin (FLOMAX) 0.4 MG capsule Take 1 capsule (0.4 mg) by mouth daily. 30 capsule 0     traZODone (DESYREL) 50 MG tablet Take 50 mg by mouth nightly as needed       tretinoin (RETIN-A) 0.05 % external cream Apply topically at bedtime       vitamin C with B complex (B COMPLEX-C) tablet Take 1 tablet by mouth daily       Vitamin D3 (CHOLECALCIFEROL) 25 mcg (1000 units) tablet Take 1 tablet (25 mcg) by mouth daily.       No current facility-administered medications for this visit.             Review of Systems:   A focused musculoskeletal and neurologic ROS was performed with  pertinent positives and negatives noted in the HPI.  Additional systems were also reviewed and are documented at the bottom of the note.         Physical Exam:   Vitals: There were no vitals taken for this visit.  Musculoskeletal, Neurologic, and Spine:        Lumbar Spine:    Appearance - No gross stepoffs or deformities    Motor -     L2-3: Hip flexion 5/5 R and 5/5 L strength          L3/4:  Knee extension R 5/5 and L 5/5 strength         L4/5:  Foot dorsiflexion R 5/5 L 5/5 and       EHL dorsiflexion R 4/5 L 4/5 strength         S1:  Plantarflexion/Peroneal Muscles  R 5/5 and L 5/5 strength    Sensation: intact to light touch L3-S1 distribution BLE      Neurologic:      REFLEXES Left Right                  Patella 1+ 1+   Ankle jerk 1+ 1+   Babinski No upgoing great toe No upgoing great toe   Clonus 0 beats 0 beats     Hip Exam:  No pain with hip log roll and no tenderness over the greater trochanters.    Alignment:  Patient stands with a neutral standing sagittal balance.           Imaging:   We ordered and independently reviewed new radiographs at this clinic visit. The results were discussed with the patient. Findings include:   Unchanged from previous visits, scoliosis films today show improved coronal alignment.  Sagittal alignment is similar to preoperative.     Assessment and Plan:     44 year old female with Ongoing pains, with stable radiographs.    Given the persistent nature, I think we should make sure there is not a developing nonunion.  I am going to have her get a CT scan of the T and L spine and then we will review the imaging together.             Respectfully,  Hansel Hernandez MD  Spine Surgery  [facility]      Again, thank you for allowing me to participate in the care of your patient.        Sincerely,        Hansel Hernandez MD    Electronically signed

## 2025-05-13 NOTE — NURSING NOTE
Reason For Visit:   Chief Complaint   Patient presents with    RECHECK     Follow up fusion of thoracic spine.        Primary MD: Patt Noble  Ref. MD: Rakel     Date of surgery: 8/30/2024    Type of surgery: Thoracic 10 to Lumbar 4 Instrumented Posterior Interbody Fusion and Nicole Woodson Osteotomy with O-Arm/Stealth Navigation, use of allograft, local autograft, and bone morphogenic protein .    There were no vitals taken for this visit.    Pain Assessment  Patient Currently in Pain: Yes  Patient's Stated Pain Goal: 6  Primary Pain Location: Back  Pain Descriptors: Discomfort    Oswestry (FRED) Questionnaire        2/18/2025    11:17 AM   OSWESTRY DISABILITY INDEX   Count 10    Sum 13    Oswestry Score (%) 26 %        Patient-reported            Neck Disability Index (NDI) Questionnaire         No data to display                            Promis 10 Assessment        2/18/2025    11:19 AM   PROMIS 10   In general, would you say your health is: Good   In general, would you say your quality of life is: Good   In general, how would you rate your physical health? Good   In general, how would you rate your mental health, including your mood and your ability to think? Fair   In general, how would you rate your satisfaction with your social activities and relationships? Poor   In general, please rate how well you carry out your usual social activities and roles Good   To what extent are you able to carry out your everyday physical activities such as walking, climbing stairs, carrying groceries, or moving a chair? Moderately   In the past 7 days, how often have you been bothered by emotional problems such as feeling anxious, depressed, or irritable? Often   In the past 7 days, how would you rate your fatigue on average? Moderate   In the past 7 days, how would you rate your pain on average, where 0 means no pain, and 10 means worst imaginable pain? 4   In general, would you say your health is: 3   In general, would  you say your quality of life is: 3   In general, how would you rate your physical health? 3   In general, how would you rate your mental health, including your mood and your ability to think? 2   In general, how would you rate your satisfaction with your social activities and relationships? 1   In general, please rate how well you carry out your usual social activities and roles. (This includes activities at home, at work and in your community, and responsibilities as a parent, child, spouse, employee, friend, etc.) 3   To what extent are you able to carry out your everyday physical activities such as walking, climbing stairs, carrying groceries, or moving a chair? 3   In the past 7 days, how often have you been bothered by emotional problems such as feeling anxious, depressed, or irritable? 4   In the past 7 days, how would you rate your fatigue on average? 3   In the past 7 days, how would you rate your pain on average, where 0 means no pain, and 10 means worst imaginable pain? 4   Global Mental Health Score 8    Global Physical Health Score 12    PROMIS TOTAL - SUBSCORES 20        Patient-reported                Mae Cardenas CMA

## 2025-05-13 NOTE — PROGRESS NOTES
Spine Surgery Return Clinic Visit      Chief Complaint:   RECHECK (Follow up fusion of thoracic spine. )      Interval HPI:  Symptom Profile Including: location of symptoms, onset, severity, exacerbating/alleviating factors, previous treatments:        Khoa Cedeño is a 44 year old female who returns in follow up.  She is having pain in the mid thoracic region.  Feels like the ribs are heavy and at times it is difficult to breathe.  Denies radicular leg symptoms.  Has done PT and NSAIds in the past.  Worse when active and improved with rest.  Sometimes bothers sleep.              Past Medical History:     Past Medical History:   Diagnosis Date    Adolescent idiopathic scoliosis of thoracolumbar region     Depression     PTSD (post-traumatic stress disorder)             Past Surgical History:     Past Surgical History:   Procedure Laterality Date    ABDOMINOPLASTY       SECTION      x5    OPTICAL TRACKING SYSTEM FUSION SPINE POSTERIOR LUMBAR THREE+ LEVELS N/A 2024    Procedure: Thoracic 10 to Lumbar 4 Instrumented Posterior Interbody Fusion and Nicole Woodson Osteotomy with O-Arm/Stealth Navigation, use of allograft, local autograft, and bone morphogenic protein;  Surgeon: Hansel Hernandez MD;  Location: [facility] OR    SALPINGECTOMY      during             Social History:     Social History     Tobacco Use    Smoking status: Former     Types: Cigarettes    Smokeless tobacco: Never   Substance Use Topics    Alcohol use: Yes     Comment: 2 glasses of wine twice a week            Family History:     Family History   Problem Relation Age of Onset    Anesthesia Reaction No family hx of     Deep Vein Thrombosis (DVT) No family hx of             Allergies:     Allergies   Allergen Reactions    Prochlorperazine Dizziness and Visual Disturbance     Loss of vision per patient             Medications:     Current Outpatient Medications   Medication Sig Dispense Refill    acetaminophen (TYLENOL) 325  MG tablet Take 2 tablets (650 mg) by mouth every 4 hours.      buPROPion (WELLBUTRIN XL) 150 MG 24 hr tablet       clindamycin (CLEOCIN T) 1 % SWAB Apply 1 applicator topically 2 times daily.      gabapentin (NEURONTIN) 300 MG capsule Take 1 capsule (300 mg) by mouth 3 times daily. 90 capsule 1    hydrOXYzine HCl (ATARAX) 25 MG tablet Take 1 tablet (25 mg) by mouth every 6 hours as needed for other (adjuvant pain). 50 tablet 0    lamoTRIgine (LAMICTAL) 200 MG tablet Take 200 mg by mouth daily.      methocarbamol (ROBAXIN) 500 MG tablet Take 1 tablet (500 mg) by mouth 4 times daily as needed for muscle spasms. 60 tablet 1    methocarbamol (ROBAXIN) 500 MG tablet Take 1 tablet (500 mg) by mouth every 6 hours. 40 tablet 0    minocycline (MINOCIN) 100 MG capsule Take 100 mg by mouth every morning      multivitamin w/minerals (MULTI-VITAMIN) tablet Take 1 tablet by mouth daily      polyethylene glycol (MIRALAX) 17 GM/Dose powder Take 17 g by mouth 2 times daily. 510 g 0    senna-docusate (SENOKOT-S/PERICOLACE) 8.6-50 MG tablet Take 2 tablets by mouth 2 times daily.      tamsulosin (FLOMAX) 0.4 MG capsule Take 1 capsule (0.4 mg) by mouth daily. 30 capsule 0    traZODone (DESYREL) 50 MG tablet Take 50 mg by mouth nightly as needed      tretinoin (RETIN-A) 0.05 % external cream Apply topically at bedtime      vitamin C with B complex (B COMPLEX-C) tablet Take 1 tablet by mouth daily      Vitamin D3 (CHOLECALCIFEROL) 25 mcg (1000 units) tablet Take 1 tablet (25 mcg) by mouth daily.       No current facility-administered medications for this visit.             Review of Systems:   A focused musculoskeletal and neurologic ROS was performed with pertinent positives and negatives noted in the HPI.  Additional systems were also reviewed and are documented at the bottom of the note.         Physical Exam:   Vitals: There were no vitals taken for this visit.  Musculoskeletal, Neurologic, and Spine:        Lumbar  Spine:    Appearance - No gross stepoffs or deformities    Motor -     L2-3: Hip flexion 5/5 R and 5/5 L strength          L3/4:  Knee extension R 5/5 and L 5/5 strength         L4/5:  Foot dorsiflexion R 5/5 L 5/5 and       EHL dorsiflexion R 4/5 L 4/5 strength         S1:  Plantarflexion/Peroneal Muscles  R 5/5 and L 5/5 strength    Sensation: intact to light touch L3-S1 distribution BLE      Neurologic:      REFLEXES Left Right                  Patella 1+ 1+   Ankle jerk 1+ 1+   Babinski No upgoing great toe No upgoing great toe   Clonus 0 beats 0 beats     Hip Exam:  No pain with hip log roll and no tenderness over the greater trochanters.    Alignment:  Patient stands with a neutral standing sagittal balance.           Imaging:   We ordered and independently reviewed new radiographs at this clinic visit. The results were discussed with the patient. Findings include:   Unchanged from previous visits, scoliosis films today show improved coronal alignment.  Sagittal alignment is similar to preoperative.     Assessment and Plan:     44 year old female with Ongoing pains, with stable radiographs.    Given the persistent nature, I think we should make sure there is not a developing nonunion.  I am going to have her get a CT scan of the T and L spine and then we will review the imaging together.             Respectfully,  Hansel Hernandez MD  Spine Surgery  [facility]

## (undated) DEVICE — BLADE BONE MILL STRK MED 5420-MED-000

## (undated) DEVICE — SOL NACL 0.9% IRRIG 3000ML BAG 2B7477

## (undated) DEVICE — SOL ISOPROPYL RUBBING ALCOHOL USP 70% 4OZ HDX-20 I0020

## (undated) DEVICE — DRAPE MAYO STAND 23X54 8337

## (undated) DEVICE — DRAIN ROUND W/RESERV KIT JACKSON PRATT 10FR 400ML SU130-402D

## (undated) DEVICE — SUCTION MINISQUAIR SMOKE EVAC CAPTURE DEVICE SQ20012-01

## (undated) DEVICE — TUBING IRRIG CYSTO/BLADDER SET 81" LF 2C4040

## (undated) DEVICE — TOOL DISSECT MIDAS MR8 14CM MATCH HEAD 3MM MR8-14MH30

## (undated) DEVICE — DRAPE O ARM TUBE 9732722

## (undated) DEVICE — LINEN TOWEL PACK X30 5481

## (undated) DEVICE — SU ETHILON 3-0 PS-1 18" 1663H

## (undated) DEVICE — CELL SAVER

## (undated) DEVICE — PREP CHLORAPREP 26ML TINTED HI-LITE ORANGE 930815

## (undated) DEVICE — GOWN IMPERVIOUS SPECIALTY XLG/XLONG 32474

## (undated) DEVICE — SYR 03ML LL W/O NDL 309657

## (undated) DEVICE — Device

## (undated) DEVICE — SU VICRYL 2-0 CT-2 CR 8X18" J726D

## (undated) DEVICE — DRSG TEGADERM 4X4 3/4" 1626W

## (undated) DEVICE — RX SURGIFLO HEMOSTATIC MATRIX W/THROMBIN 8ML 2994

## (undated) DEVICE — IOM SUPPLIES/CASE FEE

## (undated) DEVICE — CATH TRAY FOLEY SURESTEP 16FR WDRAIN BAG STLK LATEX A300316A

## (undated) DEVICE — TAPE MEDIPORE 4"X2YD 2864

## (undated) DEVICE — DRSG GAUZE 4X8" NON21842

## (undated) DEVICE — ESU GROUND PAD UNIVERSAL W/O CORD

## (undated) DEVICE — LINEN BACK PACK 5440

## (undated) DEVICE — SOL NACL 0.9% IRRIG 1000ML BOTTLE 2F7124

## (undated) DEVICE — SOL WATER IRRIG 1000ML BOTTLE 2F7114

## (undated) DEVICE — EYE PREP BETADINE 5% SOLUTION 30ML 0065-0411-30

## (undated) DEVICE — SU MONOCRYL 3-0 PS-2 18" UND MCP497G

## (undated) DEVICE — POSITIONER ARMBOARD FOAM 1PAIR LF FP-ARMB1

## (undated) DEVICE — SU DERMABOND ADVANCED .7ML DNX12

## (undated) DEVICE — STPL SKIN 35W ROTATING HEAD PRW35

## (undated) DEVICE — GLOVE BIOGEL PI ULTRATOUCH G SZ 7.5 42175

## (undated) DEVICE — KIT PATIENT CARE PROAXIS SYSTEM 6988-PV-ACP

## (undated) DEVICE — SUTURE VICRYL+ 1 CT-1 CR 8X18" VIO VCP741D

## (undated) DEVICE — GLOVE BIOGEL PI MICRO INDICATOR UNDERGLOVE SZ 8.0 48980

## (undated) DEVICE — SUTURE VICRYL+ 2-0 CT-2 CR 8X18" VCP726D

## (undated) DEVICE — DRSG DRAIN 2X2" 7087

## (undated) DEVICE — SUCTION MANIFOLD NEPTUNE 2 SYS 4 PORT 0702-020-000

## (undated) DEVICE — NEEDLE SPINAL DISP 18GA X 3.5" QUINCKE 333350

## (undated) DEVICE — MARKER SPHERES PASSIVE MEDT PACK 5 8801075

## (undated) DEVICE — PACK SPINE INSTRUMENT DRAPE 76091-ACM7820

## (undated) RX ORDER — OXYCODONE HYDROCHLORIDE 5 MG/1
TABLET ORAL
Status: DISPENSED
Start: 2024-08-30

## (undated) RX ORDER — HYDROMORPHONE HYDROCHLORIDE 1 MG/ML
INJECTION, SOLUTION INTRAMUSCULAR; INTRAVENOUS; SUBCUTANEOUS
Status: DISPENSED
Start: 2024-08-30

## (undated) RX ORDER — FENTANYL CITRATE 50 UG/ML
INJECTION, SOLUTION INTRAMUSCULAR; INTRAVENOUS
Status: DISPENSED
Start: 2024-08-30

## (undated) RX ORDER — ONDANSETRON 2 MG/ML
INJECTION INTRAMUSCULAR; INTRAVENOUS
Status: DISPENSED
Start: 2024-08-30

## (undated) RX ORDER — TOBRAMYCIN 1.2 G/30ML
INJECTION, POWDER, LYOPHILIZED, FOR SOLUTION INTRAVENOUS
Status: DISPENSED
Start: 2024-08-30

## (undated) RX ORDER — GABAPENTIN 300 MG/1
CAPSULE ORAL
Status: DISPENSED
Start: 2024-08-30

## (undated) RX ORDER — VANCOMYCIN HYDROCHLORIDE 1 G/20ML
INJECTION, POWDER, LYOPHILIZED, FOR SOLUTION INTRAVENOUS
Status: DISPENSED
Start: 2024-08-30

## (undated) RX ORDER — CEFAZOLIN SODIUM/WATER 2 G/20 ML
SYRINGE (ML) INTRAVENOUS
Status: DISPENSED
Start: 2024-08-30

## (undated) RX ORDER — CEFAZOLIN SODIUM 1 G/3ML
INJECTION, POWDER, FOR SOLUTION INTRAMUSCULAR; INTRAVENOUS
Status: DISPENSED
Start: 2024-08-30

## (undated) RX ORDER — ACETAMINOPHEN 325 MG/1
TABLET ORAL
Status: DISPENSED
Start: 2024-08-30

## (undated) RX ORDER — GLYCOPYRROLATE 0.2 MG/ML
INJECTION, SOLUTION INTRAMUSCULAR; INTRAVENOUS
Status: DISPENSED
Start: 2024-08-30

## (undated) RX ORDER — DEXAMETHASONE SODIUM PHOSPHATE 4 MG/ML
INJECTION, SOLUTION INTRA-ARTICULAR; INTRALESIONAL; INTRAMUSCULAR; INTRAVENOUS; SOFT TISSUE
Status: DISPENSED
Start: 2024-08-30